# Patient Record
Sex: FEMALE | Race: WHITE | NOT HISPANIC OR LATINO
[De-identification: names, ages, dates, MRNs, and addresses within clinical notes are randomized per-mention and may not be internally consistent; named-entity substitution may affect disease eponyms.]

---

## 2021-11-26 PROBLEM — Z00.00 ENCOUNTER FOR PREVENTIVE HEALTH EXAMINATION: Status: ACTIVE | Noted: 2021-11-26

## 2021-11-29 ENCOUNTER — APPOINTMENT (OUTPATIENT)
Dept: INTERVENTIONAL RADIOLOGY/VASCULAR | Facility: HOSPITAL | Age: 56
End: 2021-11-29

## 2021-11-29 ENCOUNTER — OUTPATIENT (OUTPATIENT)
Dept: OUTPATIENT SERVICES | Facility: HOSPITAL | Age: 56
LOS: 1 days | End: 2021-11-29
Payer: MEDICAID

## 2021-11-29 PROCEDURE — 36573 INSJ PICC RS&I 5 YR+: CPT

## 2021-11-29 PROCEDURE — C1751: CPT

## 2022-08-29 PROBLEM — Z87.442 HISTORY OF KIDNEY STONES: Status: RESOLVED | Noted: 2022-08-29 | Resolved: 2022-08-29

## 2022-08-29 PROBLEM — Z80.1 FAMILY HISTORY OF LUNG CANCER: Status: ACTIVE | Noted: 2022-08-29

## 2022-08-29 PROBLEM — Z80.41 FAMILY HISTORY OF MALIGNANT NEOPLASM OF OVARY: Status: ACTIVE | Noted: 2022-08-29

## 2022-08-29 PROBLEM — Z80.0 FAMILY HISTORY OF THROAT CANCER: Status: ACTIVE | Noted: 2022-08-29

## 2022-09-06 ENCOUNTER — NON-APPOINTMENT (OUTPATIENT)
Age: 57
End: 2022-09-06

## 2022-09-06 ENCOUNTER — APPOINTMENT (OUTPATIENT)
Dept: SURGICAL ONCOLOGY | Facility: CLINIC | Age: 57
End: 2022-09-06

## 2022-09-06 VITALS
TEMPERATURE: 98.2 F | OXYGEN SATURATION: 98 % | WEIGHT: 184 LBS | HEIGHT: 67 IN | DIASTOLIC BLOOD PRESSURE: 79 MMHG | HEART RATE: 117 BPM | BODY MASS INDEX: 28.88 KG/M2 | SYSTOLIC BLOOD PRESSURE: 117 MMHG

## 2022-09-06 DIAGNOSIS — Z87.442 PERSONAL HISTORY OF URINARY CALCULI: ICD-10-CM

## 2022-09-06 DIAGNOSIS — Z80.1 FAMILY HISTORY OF MALIGNANT NEOPLASM OF TRACHEA, BRONCHUS AND LUNG: ICD-10-CM

## 2022-09-06 DIAGNOSIS — Z80.41 FAMILY HISTORY OF MALIGNANT NEOPLASM OF OVARY: ICD-10-CM

## 2022-09-06 DIAGNOSIS — Z78.9 OTHER SPECIFIED HEALTH STATUS: ICD-10-CM

## 2022-09-06 DIAGNOSIS — Z80.0 FAMILY HISTORY OF MALIGNANT NEOPLASM OF DIGESTIVE ORGANS: ICD-10-CM

## 2022-09-06 PROCEDURE — 99204 OFFICE O/P NEW MOD 45 MIN: CPT

## 2022-09-06 RX ORDER — OMEPRAZOLE 40 MG/1
CAPSULE, DELAYED RELEASE ORAL
Refills: 0 | Status: ACTIVE | COMMUNITY

## 2022-09-06 NOTE — REASON FOR VISIT
[Initial Consultation] : an initial consultation for [Colon Cancer] : colon cancer [FreeTextEntry2] : liver mass

## 2022-09-06 NOTE — RESULTS/DATA
[FreeTextEntry1] : Diagnostic Studies\par Date: 9/6/22\par Study: MRI Abdomen (LHR)\par Results:\par \par Date: 7/25/22\par Study: PETCT (R)\par Results: 1) Interval decrease in the size and intensity of the hypermetabolic liver metastases\par 2) Otherwise no abnormal hypermetabolic activity to suggest malignancy at this time\par 3) Status post partial resection of the sigmoid colon\par 4) New 0.7 cm ground glass opacity in the inferior right upper lobe of the lung, which is not significantly hypermetabolic on the corresponding PET images, and may be infectious/inflammatory in etiology. Follow up chest CT in 2-3 months may be helpful in further evaluation\par 5) Anterior abdominal wall hernia containing fat but not bowel\par 6) Periumbilical hernia containing fat but not bowel\par \par Labs:\par Date: 8/18/22\par Results: CEA 1.4, CA 19-9; <3\par \par

## 2022-09-06 NOTE — ASSESSMENT
[FreeTextEntry1] : I) metastatic colon ca to liver\par \par P) Long discussion re findings. Has recurrent disease in 2 liver lesions and is s/p chemo (completed June 2022). Lesions are amenable to wedge resections. Will try minimally invasively. Risks and benefits discussed, including but not limited to post op bleeding, infection, biloma. All questions answered.She has not had a colonoscopy since her colon surgery in 2018 and will need that done prior to surgery. All questions answered.\par \par Pedro Jimenez MD\par \par Chief Surgical Oncology\par Multidisciplinary GI cancer program\par [facility] [place]\par [facility]\par \par Professor Surgery\par [employer]\par \par  cc Dr Oziel Paulson, [facility], [address] [place] [id number]\par

## 2022-09-06 NOTE — HISTORY OF PRESENT ILLNESS
[de-identified] : Patient Name: AUGUSTINA MARES \par MRN: [id number] \par [place] MRN:\par Referring Provider: Dr. Oziel Paulson\par Oncologist: Dr. Paulson\par Date: 9/6/2022 \par \par Diagnosis: colon cancer\par Operative Date: 7/11/2018\par Procedure: Robotic sigmoid colon resection (@ [facility])\par Pathology: [id number]\par \par 57 year female  presents for evaluation of a liver mass in the setting of colon cancer\par 4/2018 - colonoscopy with Dr. Viramontes revealed a descending colon mass; HGD arising from large TVA\par 6/2018 - colonoscopy with Dr. Rachele Perkins, revealed a 4 cm mass in the sigmoid; invasive adenocarcinoma in the background of a tubular adenoma. CT CAP had no mets.\par 7/11/2018 - s/p robotic sigmoid resection at [facility]; T3N1b\par 8/2018 - received adjuvant therapy and was under observation until 12/2021\par 12/2021 - she had 2 hepatic lesions seen on imaging\par 12/2021 - started on FOLFIRI for recurrent metastatic colon cancer (+Erbitux)\par 2/4/22 CEA 4.7\par 2/23/22 - CEA 2.7\par 3/16/22 - PETCT showed moderate decrease in size of 2 lesions.\par + P1K3CA, BRCA2, TP53 mutation with EGFR amplification\par 6/22 - completed FOLFRI/Erbitux\par 7/25/22 - PETCT showed decrease in size of liver mets. She was referred for an MRI\par 8/31/22 - MRI was done at [facility]\par \par Currently, Ms. MARES denies abdominal pain and discomfort, denies having decreased appetite, and denies nausea or vomiting. She denies changes in bowel habits and denies recent unintentional weight loss. She denies fevers, chills, or night sweats.\par \par Functional Status: Ms. MARES is able to walk up 3 flights of stairs without fatigue or dyspnea.\par

## 2022-09-06 NOTE — PHYSICAL EXAM
[Normal Neck Lymph Nodes] : normal neck lymph nodes  [Normal Supraclavicular Lymph Nodes] : normal supraclavicular lymph nodes [Normal] : grossly intact [de-identified] : kim, wears glasses [de-identified] : S1,S2, regular rate and rhythm. No murmurs heard. [de-identified] : Clear throughout. No wheezes heard. [de-identified] : small hernia supraumbilical, nontender. well healed scars [de-identified] : warm and dry

## 2022-10-04 LAB — SARS-COV-2 N GENE NPH QL NAA+PROBE: NOT DETECTED

## 2022-10-05 ENCOUNTER — TRANSCRIPTION ENCOUNTER (OUTPATIENT)
Age: 57
End: 2022-10-05

## 2022-10-05 VITALS
TEMPERATURE: 97 F | HEART RATE: 76 BPM | DIASTOLIC BLOOD PRESSURE: 76 MMHG | WEIGHT: 181 LBS | RESPIRATION RATE: 16 BRPM | SYSTOLIC BLOOD PRESSURE: 115 MMHG | HEIGHT: 67 IN | OXYGEN SATURATION: 96 %

## 2022-10-06 ENCOUNTER — TRANSCRIPTION ENCOUNTER (OUTPATIENT)
Age: 57
End: 2022-10-06

## 2022-10-06 ENCOUNTER — APPOINTMENT (OUTPATIENT)
Dept: SURGICAL ONCOLOGY | Facility: HOSPITAL | Age: 57
End: 2022-10-06

## 2022-10-06 ENCOUNTER — RESULT REVIEW (OUTPATIENT)
Age: 57
End: 2022-10-06

## 2022-10-06 ENCOUNTER — INPATIENT (INPATIENT)
Facility: HOSPITAL | Age: 57
LOS: 4 days | Discharge: HOME | DRG: 421 | End: 2022-10-11
Attending: SURGERY | Admitting: SURGERY
Payer: COMMERCIAL

## 2022-10-06 DIAGNOSIS — C18.7 MALIGNANT NEOPLASM OF SIGMOID COLON: ICD-10-CM

## 2022-10-06 DIAGNOSIS — D18.03 HEMANGIOMA OF INTRA-ABDOMINAL STRUCTURES: ICD-10-CM

## 2022-10-06 DIAGNOSIS — Z90.49 ACQUIRED ABSENCE OF OTHER SPECIFIED PARTS OF DIGESTIVE TRACT: Chronic | ICD-10-CM

## 2022-10-06 DIAGNOSIS — Z53.31 LAPAROSCOPIC SURGICAL PROCEDURE CONVERTED TO OPEN PROCEDURE: ICD-10-CM

## 2022-10-06 DIAGNOSIS — D63.0 ANEMIA IN NEOPLASTIC DISEASE: ICD-10-CM

## 2022-10-06 DIAGNOSIS — Z98.890 OTHER SPECIFIED POSTPROCEDURAL STATES: Chronic | ICD-10-CM

## 2022-10-06 DIAGNOSIS — Z90.49 ACQUIRED ABSENCE OF OTHER SPECIFIED PARTS OF DIGESTIVE TRACT: ICD-10-CM

## 2022-10-06 DIAGNOSIS — E83.39 OTHER DISORDERS OF PHOSPHORUS METABOLISM: ICD-10-CM

## 2022-10-06 DIAGNOSIS — E83.42 HYPOMAGNESEMIA: ICD-10-CM

## 2022-10-06 DIAGNOSIS — Z90.89 ACQUIRED ABSENCE OF OTHER ORGANS: Chronic | ICD-10-CM

## 2022-10-06 DIAGNOSIS — C78.7 SECONDARY MALIGNANT NEOPLASM OF LIVER AND INTRAHEPATIC BILE DUCT: ICD-10-CM

## 2022-10-06 DIAGNOSIS — K43.2 INCISIONAL HERNIA WITHOUT OBSTRUCTION OR GANGRENE: ICD-10-CM

## 2022-10-06 DIAGNOSIS — Z92.21 PERSONAL HISTORY OF ANTINEOPLASTIC CHEMOTHERAPY: ICD-10-CM

## 2022-10-06 LAB
ALBUMIN SERPL ELPH-MCNC: 3.2 G/DL — LOW (ref 3.3–5)
ALP SERPL-CCNC: 66 U/L — SIGNIFICANT CHANGE UP (ref 40–120)
ALT FLD-CCNC: 233 U/L — HIGH (ref 10–45)
ANION GAP SERPL CALC-SCNC: 13 MMOL/L — SIGNIFICANT CHANGE UP (ref 5–17)
APTT BLD: 22.3 SEC — LOW (ref 27.5–35.5)
AST SERPL-CCNC: 287 U/L — HIGH (ref 10–40)
BASE EXCESS BLDA CALC-SCNC: -4.4 MMOL/L — LOW (ref -2–3)
BILIRUB SERPL-MCNC: 0.8 MG/DL — SIGNIFICANT CHANGE UP (ref 0.2–1.2)
BLD GP AB SCN SERPL QL: NEGATIVE — SIGNIFICANT CHANGE UP
BUN SERPL-MCNC: 11 MG/DL — SIGNIFICANT CHANGE UP (ref 7–23)
CA-I BLDA-SCNC: 1.1 MMOL/L — LOW (ref 1.15–1.33)
CALCIUM SERPL-MCNC: 9 MG/DL — SIGNIFICANT CHANGE UP (ref 8.4–10.5)
CHLORIDE SERPL-SCNC: 107 MMOL/L — SIGNIFICANT CHANGE UP (ref 96–108)
CO2 BLDA-SCNC: 21 MMOL/L — SIGNIFICANT CHANGE UP (ref 19–24)
CO2 SERPL-SCNC: 21 MMOL/L — LOW (ref 22–31)
COHGB MFR BLDA: 0.8 % — SIGNIFICANT CHANGE UP
CREAT SERPL-MCNC: 0.75 MG/DL — SIGNIFICANT CHANGE UP (ref 0.5–1.3)
EGFR: 94 ML/MIN/1.73M2 — SIGNIFICANT CHANGE UP
GLUCOSE BLDA-MCNC: 206 MG/DL — HIGH (ref 70–99)
GLUCOSE SERPL-MCNC: 204 MG/DL — HIGH (ref 70–99)
HCO3 BLDA-SCNC: 20 MMOL/L — LOW (ref 21–28)
HCT VFR BLD CALC: 23.3 % — LOW (ref 34.5–45)
HCT VFR BLD CALC: 24.4 % — LOW (ref 34.5–45)
HGB BLD-MCNC: 7.2 G/DL — LOW (ref 11.5–15.5)
HGB BLD-MCNC: 7.7 G/DL — LOW (ref 11.5–15.5)
HGB BLDA-MCNC: 10.4 G/DL — LOW (ref 11.7–16.1)
INR BLD: 1.22 — HIGH (ref 0.88–1.16)
MAGNESIUM SERPL-MCNC: 1.5 MG/DL — LOW (ref 1.6–2.6)
MCHC RBC-ENTMCNC: 26.7 PG — LOW (ref 27–34)
MCHC RBC-ENTMCNC: 26.7 PG — LOW (ref 27–34)
MCHC RBC-ENTMCNC: 30.9 GM/DL — LOW (ref 32–36)
MCHC RBC-ENTMCNC: 31.6 GM/DL — LOW (ref 32–36)
MCV RBC AUTO: 84.7 FL — SIGNIFICANT CHANGE UP (ref 80–100)
MCV RBC AUTO: 86.3 FL — SIGNIFICANT CHANGE UP (ref 80–100)
METHGB MFR BLDA: 0 % — SIGNIFICANT CHANGE UP
NRBC # BLD: 0 /100 WBCS — SIGNIFICANT CHANGE UP (ref 0–0)
NRBC # BLD: 0 /100 WBCS — SIGNIFICANT CHANGE UP (ref 0–0)
OXYHGB MFR BLDA: 98.2 % — HIGH (ref 90–95)
PCO2 BLDA: 34 MMHG — SIGNIFICANT CHANGE UP (ref 32–45)
PH BLDA: 7.38 — SIGNIFICANT CHANGE UP (ref 7.35–7.45)
PHOSPHATE SERPL-MCNC: 5.2 MG/DL — HIGH (ref 2.5–4.5)
PLATELET # BLD AUTO: 259 K/UL — SIGNIFICANT CHANGE UP (ref 150–400)
PLATELET # BLD AUTO: 333 K/UL — SIGNIFICANT CHANGE UP (ref 150–400)
PO2 BLDA: 293 MMHG — HIGH (ref 83–108)
POTASSIUM BLDA-SCNC: 4.5 MMOL/L — SIGNIFICANT CHANGE UP (ref 3.5–5.1)
POTASSIUM SERPL-MCNC: 4.6 MMOL/L — SIGNIFICANT CHANGE UP (ref 3.5–5.3)
POTASSIUM SERPL-SCNC: 4.6 MMOL/L — SIGNIFICANT CHANGE UP (ref 3.5–5.3)
PROT SERPL-MCNC: 4.9 G/DL — LOW (ref 6–8.3)
PROTHROM AB SERPL-ACNC: 14.5 SEC — HIGH (ref 10.5–13.4)
RBC # BLD: 2.7 M/UL — LOW (ref 3.8–5.2)
RBC # BLD: 2.88 M/UL — LOW (ref 3.8–5.2)
RBC # FLD: 16.6 % — HIGH (ref 10.3–14.5)
RBC # FLD: 16.7 % — HIGH (ref 10.3–14.5)
RH IG SCN BLD-IMP: POSITIVE — SIGNIFICANT CHANGE UP
SAO2 % BLDA: 99 % — HIGH (ref 94–98)
SODIUM BLDA-SCNC: 135 MMOL/L — LOW (ref 136–145)
SODIUM SERPL-SCNC: 141 MMOL/L — SIGNIFICANT CHANGE UP (ref 135–145)
WBC # BLD: 13.78 K/UL — HIGH (ref 3.8–10.5)
WBC # BLD: 14.99 K/UL — HIGH (ref 3.8–10.5)
WBC # FLD AUTO: 13.78 K/UL — HIGH (ref 3.8–10.5)
WBC # FLD AUTO: 14.99 K/UL — HIGH (ref 3.8–10.5)

## 2022-10-06 PROCEDURE — 88302 TISSUE EXAM BY PATHOLOGIST: CPT | Mod: 26

## 2022-10-06 PROCEDURE — 76998 US GUIDE INTRAOP: CPT | Mod: 26

## 2022-10-06 PROCEDURE — 88307 TISSUE EXAM BY PATHOLOGIST: CPT | Mod: 26

## 2022-10-06 PROCEDURE — 47100 WEDGE BIOPSY OF LIVER: CPT | Mod: 62,59

## 2022-10-06 PROCEDURE — 47120 PARTIAL REMOVAL OF LIVER: CPT | Mod: 62

## 2022-10-06 PROCEDURE — 88342 IMHCHEM/IMCYTCHM 1ST ANTB: CPT | Mod: 26

## 2022-10-06 PROCEDURE — 99221 1ST HOSP IP/OBS SF/LOW 40: CPT

## 2022-10-06 PROCEDURE — 88309 TISSUE EXAM BY PATHOLOGIST: CPT | Mod: 26

## 2022-10-06 PROCEDURE — 49560: CPT

## 2022-10-06 PROCEDURE — 88341 IMHCHEM/IMCYTCHM EA ADD ANTB: CPT | Mod: 26

## 2022-10-06 PROCEDURE — 88331 PATH CONSLTJ SURG 1 BLK 1SPC: CPT | Mod: 26

## 2022-10-06 PROCEDURE — 71045 X-RAY EXAM CHEST 1 VIEW: CPT | Mod: 26

## 2022-10-06 DEVICE — STAPLER COVIDIEN TRI-STAPLE 60MM TAN RELOAD: Type: IMPLANTABLE DEVICE | Status: FUNCTIONAL

## 2022-10-06 DEVICE — VISTASEAL FIBRIN HUMAN 10ML: Type: IMPLANTABLE DEVICE | Status: FUNCTIONAL

## 2022-10-06 DEVICE — SURGIFLO HEMOSTATIC MATRIX KIT: Type: IMPLANTABLE DEVICE | Status: FUNCTIONAL

## 2022-10-06 DEVICE — CLIP APPLIER ETHICON LIGACLIP 9 3/8" SMALL: Type: IMPLANTABLE DEVICE | Status: FUNCTIONAL

## 2022-10-06 DEVICE — SURGICEL FIBRILLAR 4 X 4": Type: IMPLANTABLE DEVICE | Status: FUNCTIONAL

## 2022-10-06 DEVICE — CLIP APPLIER ETHICON LIGACLIP 11.5" MEDIUM: Type: IMPLANTABLE DEVICE | Status: FUNCTIONAL

## 2022-10-06 DEVICE — SURGICEL FIBRILLAR 1 X 2": Type: IMPLANTABLE DEVICE | Status: FUNCTIONAL

## 2022-10-06 RX ORDER — NALOXONE HYDROCHLORIDE 4 MG/.1ML
0.1 SPRAY NASAL
Refills: 0 | Status: DISCONTINUED | OUTPATIENT
Start: 2022-10-06 | End: 2022-10-11

## 2022-10-06 RX ORDER — HYDROMORPHONE HYDROCHLORIDE 2 MG/ML
0.25 INJECTION INTRAMUSCULAR; INTRAVENOUS; SUBCUTANEOUS ONCE
Refills: 0 | Status: DISCONTINUED | OUTPATIENT
Start: 2022-10-06 | End: 2022-10-06

## 2022-10-06 RX ORDER — SODIUM CHLORIDE 9 MG/ML
500 INJECTION, SOLUTION INTRAVENOUS ONCE
Refills: 0 | Status: COMPLETED | OUTPATIENT
Start: 2022-10-06 | End: 2022-10-06

## 2022-10-06 RX ORDER — HYDROMORPHONE HYDROCHLORIDE 2 MG/ML
30 INJECTION INTRAMUSCULAR; INTRAVENOUS; SUBCUTANEOUS
Refills: 0 | Status: DISCONTINUED | OUTPATIENT
Start: 2022-10-06 | End: 2022-10-08

## 2022-10-06 RX ORDER — ONDANSETRON 8 MG/1
4 TABLET, FILM COATED ORAL EVERY 6 HOURS
Refills: 0 | Status: DISCONTINUED | OUTPATIENT
Start: 2022-10-06 | End: 2022-10-06

## 2022-10-06 RX ORDER — MAGNESIUM SULFATE 500 MG/ML
1 VIAL (ML) INJECTION ONCE
Refills: 0 | Status: COMPLETED | OUTPATIENT
Start: 2022-10-06 | End: 2022-10-06

## 2022-10-06 RX ORDER — PANTOPRAZOLE SODIUM 20 MG/1
40 TABLET, DELAYED RELEASE ORAL DAILY
Refills: 0 | Status: DISCONTINUED | OUTPATIENT
Start: 2022-10-06 | End: 2022-10-11

## 2022-10-06 RX ORDER — SODIUM CHLORIDE 9 MG/ML
1000 INJECTION, SOLUTION INTRAVENOUS
Refills: 0 | Status: DISCONTINUED | OUTPATIENT
Start: 2022-10-06 | End: 2022-10-09

## 2022-10-06 RX ORDER — ONDANSETRON 8 MG/1
4 TABLET, FILM COATED ORAL EVERY 6 HOURS
Refills: 0 | Status: DISCONTINUED | OUTPATIENT
Start: 2022-10-06 | End: 2022-10-11

## 2022-10-06 RX ADMIN — SODIUM CHLORIDE 1000 MILLILITER(S): 9 INJECTION, SOLUTION INTRAVENOUS at 20:37

## 2022-10-06 RX ADMIN — SODIUM CHLORIDE 1000 MILLILITER(S): 9 INJECTION, SOLUTION INTRAVENOUS at 19:19

## 2022-10-06 RX ADMIN — HYDROMORPHONE HYDROCHLORIDE 0.25 MILLIGRAM(S): 2 INJECTION INTRAMUSCULAR; INTRAVENOUS; SUBCUTANEOUS at 16:54

## 2022-10-06 RX ADMIN — PANTOPRAZOLE SODIUM 40 MILLIGRAM(S): 20 TABLET, DELAYED RELEASE ORAL at 16:38

## 2022-10-06 RX ADMIN — HYDROMORPHONE HYDROCHLORIDE 0.25 MILLIGRAM(S): 2 INJECTION INTRAMUSCULAR; INTRAVENOUS; SUBCUTANEOUS at 15:23

## 2022-10-06 RX ADMIN — Medication 100 GRAM(S): at 18:16

## 2022-10-06 RX ADMIN — HYDROMORPHONE HYDROCHLORIDE 30 MILLILITER(S): 2 INJECTION INTRAMUSCULAR; INTRAVENOUS; SUBCUTANEOUS at 16:06

## 2022-10-06 NOTE — H&P ADULT - HISTORY OF PRESENT ILLNESS
58 y/o F w/ PMH of sigmoid adenocarcinoma (T3N1b) with mets to the liver s/p robotic assisted sigmoid resection on 7/11/18 that presents today for a robotic assisted liver partial liver resection. She recieved adjuvant therapy and was under observation until 12/2021 when 2 hepatic lesions seen on imaging so he was started on Folfiri and Erbitux for recurrent metastatic colon cancer. On 3/16/22, underwent a PETCT which showed moderate decrease in size of 2 lesions, + P1K3CA, BRCA2, TP53 mutation with EGFR amplification.

## 2022-10-06 NOTE — BRIEF OPERATIVE NOTE - OPERATION/FINDINGS
Dx lap via RUQ nguyen entry revealed a large 4cm mass anterior surface of segment 4, small 1cm mass segment 3 and 2cm mass segment 6.  Conversion to open via Alysha incision   Segment 3 lesion excised with electrocautery and thunderbeat.   Segment 6 lesion excised with electrocautery and thumberbeat, haemostasis achieved with tackseal   Segment 4 lesion excision with a combination of electrocautery, thunderbeat and cusa with intra-op ultrasound with preservation of blood supply to segment 2/3. Haemostatis achieve with tackseal and vistaseal.   Umbilical hernia repaired primarily with vicryl and stratifix.   Fascia closed in loop PDS and prolene.  Skin loosely closed with staples.

## 2022-10-06 NOTE — PROGRESS NOTE ADULT - SUBJECTIVE AND OBJECTIVE BOX
General Surgery Post op Check    SUBJECTIVE:  Patient seen and examined at bedside in PACU for post op check. Vital signs reviewed. Patient reports she is in considerable amount of pain after the procedure. States her pain is 7/10 and located near abdominal incision. Reports PCA pump has helped slightly with pain. Denies CP, SOB, N, V. States she is hungry and would like to eat.    Vital Signs Last 24 Hrs  T(C): 36.1 (06 Oct 2022 14:58), Max: 36.1 (06 Oct 2022 14:58)  T(F): 96.9 (06 Oct 2022 14:58), Max: 96.9 (06 Oct 2022 14:58)  HR: 104 (06 Oct 2022 16:33) (93 - 104)  BP: 94/88 (06 Oct 2022 16:18) (92/55 - 112/69)  BP(mean): 92 (06 Oct 2022 16:18) (68 - 92)  RR: 17 (06 Oct 2022 16:33) (15 - 20)  SpO2: 100% (06 Oct 2022 16:33) (100% - 100%)    Parameters below as of 06 Oct 2022 16:18  Patient On (Oxygen Delivery Method): nasal cannula  O2 Flow (L/min): 3      I&O's Summary    06 Oct 2022 07:01  -  06 Oct 2022 17:24  --------------------------------------------------------  IN: 360 mL / OUT: 220 mL / NET: 140 mL        Physical Exam  General: Resting in bed, NAD  HEENT: ATNC  Pulmonary: Equal chest wall expansion b/l, no respiratory distress or accessory muscle noted  Cardiovascular: tachycardic - sinus rhythm  Abdomen: Soft, mildly distended, tender to palpation in RUQ without rebound or guarding. Incision clean, dry, intact with minimal sanguinous strike through  : 16 Fr romero catheter in place draining yellow urine  Extremities WWP, SCDs in place, No significant edema noted

## 2022-10-06 NOTE — PROGRESS NOTE ADULT - ASSESSMENT
58 y/o F w/ PMH of sigmoid adenocarcinoma (T3N1b) with mets to the liver s/p robotic assisted sigmoid resection on 7/11/18 that presents today for a robotic assisted liver partial liver resection. Patient now s/p RA converted to open partial liver resection. Patient tolerated the procedure well -  cc. Patient doing well post operatively. Reports pain that is mildly curbed by PCA. 250 cc urine output since OR.  and /67 at post op check. Will closely monitor vitals with potential for PRBC.     -NPO.  -LR @ 120  -No antibiotics  -Dilaudid PCA for pain control. Tylenol PRN  -Nausea control PRN  -Ryder. Will likely remove tomorrow  -Repeat labs with Hb of 7.9. Will monitor CBC and patient's clinical status   -IS

## 2022-10-06 NOTE — H&P ADULT - ASSESSMENT
56 y/o F w/ PMH of sigmoid adenocarcinoma (T3N1b) with mets to the liver s/p robotic assisted sigmoid resection on 7/11/18 that presents today for a robotic assisted liver partial liver resection.   - to OR

## 2022-10-06 NOTE — H&P ADULT - NSICDXPASTSURGICALHX_GEN_ALL_CORE_FT
PAST SURGICAL HISTORY:  History of lithotripsy     History of tonsillectomy     Status post laparoscopic-assisted sigmoidectomy

## 2022-10-07 LAB
ALBUMIN SERPL ELPH-MCNC: 3.3 G/DL — SIGNIFICANT CHANGE UP (ref 3.3–5)
ALP SERPL-CCNC: 79 U/L — SIGNIFICANT CHANGE UP (ref 40–120)
ALT FLD-CCNC: 395 U/L — HIGH (ref 10–45)
ANION GAP SERPL CALC-SCNC: 9 MMOL/L — SIGNIFICANT CHANGE UP (ref 5–17)
AST SERPL-CCNC: 402 U/L — HIGH (ref 10–40)
BILIRUB SERPL-MCNC: 0.8 MG/DL — SIGNIFICANT CHANGE UP (ref 0.2–1.2)
BUN SERPL-MCNC: 14 MG/DL — SIGNIFICANT CHANGE UP (ref 7–23)
CALCIUM SERPL-MCNC: 8.3 MG/DL — LOW (ref 8.4–10.5)
CHLORIDE SERPL-SCNC: 106 MMOL/L — SIGNIFICANT CHANGE UP (ref 96–108)
CO2 SERPL-SCNC: 25 MMOL/L — SIGNIFICANT CHANGE UP (ref 22–31)
CREAT SERPL-MCNC: 0.92 MG/DL — SIGNIFICANT CHANGE UP (ref 0.5–1.3)
EGFR: 74 ML/MIN/1.73M2 — SIGNIFICANT CHANGE UP
GLUCOSE SERPL-MCNC: 151 MG/DL — HIGH (ref 70–99)
HCT VFR BLD CALC: 25.8 % — LOW (ref 34.5–45)
HGB BLD-MCNC: 8.4 G/DL — LOW (ref 11.5–15.5)
MAGNESIUM SERPL-MCNC: 1.9 MG/DL — SIGNIFICANT CHANGE UP (ref 1.6–2.6)
MCHC RBC-ENTMCNC: 27.6 PG — SIGNIFICANT CHANGE UP (ref 27–34)
MCHC RBC-ENTMCNC: 32.6 GM/DL — SIGNIFICANT CHANGE UP (ref 32–36)
MCV RBC AUTO: 84.9 FL — SIGNIFICANT CHANGE UP (ref 80–100)
NRBC # BLD: 0 /100 WBCS — SIGNIFICANT CHANGE UP (ref 0–0)
PHOSPHATE SERPL-MCNC: 5 MG/DL — HIGH (ref 2.5–4.5)
PLATELET # BLD AUTO: 198 K/UL — SIGNIFICANT CHANGE UP (ref 150–400)
POTASSIUM SERPL-MCNC: 5.1 MMOL/L — SIGNIFICANT CHANGE UP (ref 3.5–5.3)
POTASSIUM SERPL-SCNC: 5.1 MMOL/L — SIGNIFICANT CHANGE UP (ref 3.5–5.3)
PROT SERPL-MCNC: 5.3 G/DL — LOW (ref 6–8.3)
RBC # BLD: 3.04 M/UL — LOW (ref 3.8–5.2)
RBC # FLD: 15.9 % — HIGH (ref 10.3–14.5)
SODIUM SERPL-SCNC: 140 MMOL/L — SIGNIFICANT CHANGE UP (ref 135–145)
WBC # BLD: 13.89 K/UL — HIGH (ref 3.8–10.5)
WBC # FLD AUTO: 13.89 K/UL — HIGH (ref 3.8–10.5)

## 2022-10-07 RX ORDER — SODIUM CHLORIDE 9 MG/ML
500 INJECTION, SOLUTION INTRAVENOUS ONCE
Refills: 0 | Status: COMPLETED | OUTPATIENT
Start: 2022-10-07 | End: 2022-10-07

## 2022-10-07 RX ORDER — HEPARIN SODIUM 5000 [USP'U]/ML
5000 INJECTION INTRAVENOUS; SUBCUTANEOUS EVERY 8 HOURS
Refills: 0 | Status: DISCONTINUED | OUTPATIENT
Start: 2022-10-07 | End: 2022-10-11

## 2022-10-07 RX ORDER — MAGNESIUM SULFATE 500 MG/ML
1 VIAL (ML) INJECTION ONCE
Refills: 0 | Status: COMPLETED | OUTPATIENT
Start: 2022-10-07 | End: 2022-10-07

## 2022-10-07 RX ORDER — ACETAMINOPHEN 500 MG
650 TABLET ORAL EVERY 6 HOURS
Refills: 0 | Status: DISCONTINUED | OUTPATIENT
Start: 2022-10-07 | End: 2022-10-10

## 2022-10-07 RX ORDER — SODIUM CHLORIDE 9 MG/ML
500 INJECTION, SOLUTION INTRAVENOUS ONCE
Refills: 0 | Status: DISCONTINUED | OUTPATIENT
Start: 2022-10-07 | End: 2022-10-07

## 2022-10-07 RX ORDER — SODIUM CHLORIDE 9 MG/ML
1000 INJECTION, SOLUTION INTRAVENOUS ONCE
Refills: 0 | Status: DISCONTINUED | OUTPATIENT
Start: 2022-10-07 | End: 2022-10-07

## 2022-10-07 RX ADMIN — Medication 650 MILLIGRAM(S): at 22:09

## 2022-10-07 RX ADMIN — HYDROMORPHONE HYDROCHLORIDE 30 MILLILITER(S): 2 INJECTION INTRAMUSCULAR; INTRAVENOUS; SUBCUTANEOUS at 19:47

## 2022-10-07 RX ADMIN — SODIUM CHLORIDE 120 MILLILITER(S): 9 INJECTION, SOLUTION INTRAVENOUS at 16:49

## 2022-10-07 RX ADMIN — Medication 650 MILLIGRAM(S): at 21:15

## 2022-10-07 RX ADMIN — Medication 650 MILLIGRAM(S): at 19:00

## 2022-10-07 RX ADMIN — Medication 100 GRAM(S): at 09:21

## 2022-10-07 RX ADMIN — SODIUM CHLORIDE 1000 MILLILITER(S): 9 INJECTION, SOLUTION INTRAVENOUS at 13:38

## 2022-10-07 RX ADMIN — HEPARIN SODIUM 5000 UNIT(S): 5000 INJECTION INTRAVENOUS; SUBCUTANEOUS at 22:09

## 2022-10-07 RX ADMIN — Medication 650 MILLIGRAM(S): at 12:49

## 2022-10-07 RX ADMIN — Medication 650 MILLIGRAM(S): at 18:17

## 2022-10-07 RX ADMIN — SODIUM CHLORIDE 1000 MILLILITER(S): 9 INJECTION, SOLUTION INTRAVENOUS at 03:20

## 2022-10-07 RX ADMIN — PANTOPRAZOLE SODIUM 40 MILLIGRAM(S): 20 TABLET, DELAYED RELEASE ORAL at 12:49

## 2022-10-07 RX ADMIN — HEPARIN SODIUM 5000 UNIT(S): 5000 INJECTION INTRAVENOUS; SUBCUTANEOUS at 15:36

## 2022-10-07 RX ADMIN — Medication 650 MILLIGRAM(S): at 13:30

## 2022-10-07 NOTE — PROVIDER CONTACT NOTE (OTHER) - SITUATION
There in no order or indication for use for R triple lumen IJ central line placed yesterday
Patient has been tachycardic most of the morning, low urine output from Ryder

## 2022-10-07 NOTE — PROGRESS NOTE ADULT - ASSESSMENT
56 y/o F w/ PMH of sigmoid adenocarcinoma (T3N1b) with mets to the liver s/p robotic assisted sigmoid resection on 7/11/18 that presents today for a robotic assisted liver partial liver resection. She recieved adjuvant therapy and was under observation until 12/2021 when 2 hepatic lesions seen on imaging so he was started on Folfiri and Erbitux for recurrent metastatic colon cancer. On 3/16/22, underwent a PETCT which showed moderate decrease in size of 2 lesions, + P1K3CA, BRCA2, TP53 mutation with EGFR amplification. Pt is now s/p RA converted to open partial liver resection on 10/6.     Pain/nausea control prn;   Dilaudid PCA. Tylenol PRN.   No antibiotics  LR @ 120  CLD  SCDs, holding HSQ until afternoon 10/7  PPI  OOBA/IS  Ryder

## 2022-10-07 NOTE — PROVIDER CONTACT NOTE (OTHER) - ASSESSMENT
Patient tachycardic to 110s, as high as 120s, afebrile, denies pain at this time, Ryder output documented in flowsheet, MD Issa made aware. Team also inquired regarding removal of R triple lumen IJ.
Patient has R triple lumen IJ central line placed yesterday in OR. Patient already has L 18G and R 22G peripheral IVs in place. Expressed concern for risk for CLABSI to team, chief of surgery initially declined removal of central line, IRVIN Mann and GERDA Crystal made aware

## 2022-10-07 NOTE — PROVIDER CONTACT NOTE (OTHER) - RECOMMENDATIONS
500ml LR bolus ordered, continue to monitor vital signs and urine output
Central line to be removed by surgical team

## 2022-10-07 NOTE — PROGRESS NOTE ADULT - SUBJECTIVE AND OBJECTIVE BOX
STATUS POST:  Open partial resection of liver    POST OPERATIVE DAY #: 1    SUBJECTIVE: Patient was seen and examined bedside with chief resident. Patient states she is in less pain than she was initially after the procedure. Patient has not yet had any flatulence or bowel movements. Patient denies any nausea or vomitting.     Vital Signs Last 24 Hrs  T(C): 36.8 (07 Oct 2022 09:38), Max: 37.3 (07 Oct 2022 04:47)  T(F): 98.2 (07 Oct 2022 09:38), Max: 99.2 (07 Oct 2022 04:47)  HR: 122 (07 Oct 2022 08:33) (93 - 122)  BP: 109/62 (07 Oct 2022 08:33) (88/54 - 125/61)  BP(mean): 80 (07 Oct 2022 08:33) (64 - 92)  RR: 17 (07 Oct 2022 08:33) (12 - 20)  SpO2: 95% (07 Oct 2022 08:33) (95% - 100%)    Parameters below as of 07 Oct 2022 08:33  Patient On (Oxygen Delivery Method): nasal cannula  O2 Flow (L/min): 2      I&O's Summary    06 Oct 2022 07:01  -  07 Oct 2022 07:00  --------------------------------------------------------  IN: 4720 mL / OUT: 660 mL / NET: 4060 mL    07 Oct 2022 07:01  -  07 Oct 2022 10:09  --------------------------------------------------------  IN: 240 mL / OUT: 100 mL / NET: 140 mL        Physical Exam:  General: Resting in bed, NAD  HEENT: ATNC  Pulmonary: Equal chest wall expansion b/l, no respiratory distress or accessory muscle noted  Cardiovascular: tachycardic sinus rhythm  Abdomen: Soft, mildly distended, tender to palpation in RUQ without rebound or guarding. Incision clean, dry, intact with minimal sanguinous strike through  : Ryder catheter in place draining yellow urine  Extremities WWP, SCDs in place, No significant edema noted      LABS:                        8.4    13.89 )-----------( 198      ( 07 Oct 2022 04:54 )             25.8     10-07    140  |  106  |  14  ----------------------------<  151<H>  5.1   |  25  |  0.92    Ca    8.3<L>      07 Oct 2022 04:54  Phos  5.0     10-07  Mg     1.9     10-07    TPro  5.3<L>  /  Alb  3.3  /  TBili  0.8  /  DBili  x   /  AST  402<H>  /  ALT  395<H>  /  AlkPhos  79  10-07    PT/INR - ( 06 Oct 2022 16:36 )   PT: 14.5 sec;   INR: 1.22          PTT - ( 06 Oct 2022 16:36 )  PTT:22.3 sec

## 2022-10-08 LAB
ANION GAP SERPL CALC-SCNC: 8 MMOL/L — SIGNIFICANT CHANGE UP (ref 5–17)
BUN SERPL-MCNC: 8 MG/DL — SIGNIFICANT CHANGE UP (ref 7–23)
CALCIUM SERPL-MCNC: 8.2 MG/DL — LOW (ref 8.4–10.5)
CHLORIDE SERPL-SCNC: 104 MMOL/L — SIGNIFICANT CHANGE UP (ref 96–108)
CO2 SERPL-SCNC: 27 MMOL/L — SIGNIFICANT CHANGE UP (ref 22–31)
CREAT SERPL-MCNC: 0.67 MG/DL — SIGNIFICANT CHANGE UP (ref 0.5–1.3)
EGFR: 103 ML/MIN/1.73M2 — SIGNIFICANT CHANGE UP
GLUCOSE SERPL-MCNC: 110 MG/DL — HIGH (ref 70–99)
HCT VFR BLD CALC: 22.6 % — LOW (ref 34.5–45)
HCT VFR BLD CALC: 24 % — LOW (ref 34.5–45)
HGB BLD-MCNC: 7.1 G/DL — LOW (ref 11.5–15.5)
HGB BLD-MCNC: 7.9 G/DL — LOW (ref 11.5–15.5)
MAGNESIUM SERPL-MCNC: 2 MG/DL — SIGNIFICANT CHANGE UP (ref 1.6–2.6)
MCHC RBC-ENTMCNC: 27.7 PG — SIGNIFICANT CHANGE UP (ref 27–34)
MCHC RBC-ENTMCNC: 28.6 PG — SIGNIFICANT CHANGE UP (ref 27–34)
MCHC RBC-ENTMCNC: 31.4 GM/DL — LOW (ref 32–36)
MCHC RBC-ENTMCNC: 32.9 GM/DL — SIGNIFICANT CHANGE UP (ref 32–36)
MCV RBC AUTO: 87 FL — SIGNIFICANT CHANGE UP (ref 80–100)
MCV RBC AUTO: 88.3 FL — SIGNIFICANT CHANGE UP (ref 80–100)
NRBC # BLD: 0 /100 WBCS — SIGNIFICANT CHANGE UP (ref 0–0)
NRBC # BLD: 0 /100 WBCS — SIGNIFICANT CHANGE UP (ref 0–0)
PHOSPHATE SERPL-MCNC: 2.2 MG/DL — LOW (ref 2.5–4.5)
PLATELET # BLD AUTO: 133 K/UL — LOW (ref 150–400)
PLATELET # BLD AUTO: 143 K/UL — LOW (ref 150–400)
POTASSIUM SERPL-MCNC: 4.4 MMOL/L — SIGNIFICANT CHANGE UP (ref 3.5–5.3)
POTASSIUM SERPL-SCNC: 4.4 MMOL/L — SIGNIFICANT CHANGE UP (ref 3.5–5.3)
RBC # BLD: 2.56 M/UL — LOW (ref 3.8–5.2)
RBC # BLD: 2.76 M/UL — LOW (ref 3.8–5.2)
RBC # FLD: 15.7 % — HIGH (ref 10.3–14.5)
RBC # FLD: 16.3 % — HIGH (ref 10.3–14.5)
SODIUM SERPL-SCNC: 139 MMOL/L — SIGNIFICANT CHANGE UP (ref 135–145)
WBC # BLD: 13.9 K/UL — HIGH (ref 3.8–10.5)
WBC # BLD: 14.19 K/UL — HIGH (ref 3.8–10.5)
WBC # FLD AUTO: 13.9 K/UL — HIGH (ref 3.8–10.5)
WBC # FLD AUTO: 14.19 K/UL — HIGH (ref 3.8–10.5)

## 2022-10-08 RX ORDER — POTASSIUM PHOSPHATE, MONOBASIC POTASSIUM PHOSPHATE, DIBASIC 236; 224 MG/ML; MG/ML
15 INJECTION, SOLUTION INTRAVENOUS ONCE
Refills: 0 | Status: COMPLETED | OUTPATIENT
Start: 2022-10-08 | End: 2022-10-08

## 2022-10-08 RX ORDER — HYDROMORPHONE HYDROCHLORIDE 2 MG/ML
0.5 INJECTION INTRAMUSCULAR; INTRAVENOUS; SUBCUTANEOUS EVERY 6 HOURS
Refills: 0 | Status: DISCONTINUED | OUTPATIENT
Start: 2022-10-08 | End: 2022-10-11

## 2022-10-08 RX ORDER — OXYCODONE HYDROCHLORIDE 5 MG/1
5 TABLET ORAL EVERY 6 HOURS
Refills: 0 | Status: DISCONTINUED | OUTPATIENT
Start: 2022-10-08 | End: 2022-10-08

## 2022-10-08 RX ORDER — OXYCODONE HYDROCHLORIDE 5 MG/1
10 TABLET ORAL EVERY 6 HOURS
Refills: 0 | Status: DISCONTINUED | OUTPATIENT
Start: 2022-10-08 | End: 2022-10-08

## 2022-10-08 RX ORDER — HYDROMORPHONE HYDROCHLORIDE 2 MG/ML
0.25 INJECTION INTRAMUSCULAR; INTRAVENOUS; SUBCUTANEOUS ONCE
Refills: 0 | Status: DISCONTINUED | OUTPATIENT
Start: 2022-10-08 | End: 2022-10-08

## 2022-10-08 RX ADMIN — Medication 650 MILLIGRAM(S): at 17:29

## 2022-10-08 RX ADMIN — HYDROMORPHONE HYDROCHLORIDE 0.25 MILLIGRAM(S): 2 INJECTION INTRAMUSCULAR; INTRAVENOUS; SUBCUTANEOUS at 14:21

## 2022-10-08 RX ADMIN — HEPARIN SODIUM 5000 UNIT(S): 5000 INJECTION INTRAVENOUS; SUBCUTANEOUS at 06:16

## 2022-10-08 RX ADMIN — HYDROMORPHONE HYDROCHLORIDE 0.5 MILLIGRAM(S): 2 INJECTION INTRAMUSCULAR; INTRAVENOUS; SUBCUTANEOUS at 13:00

## 2022-10-08 RX ADMIN — HYDROMORPHONE HYDROCHLORIDE 0.25 MILLIGRAM(S): 2 INJECTION INTRAMUSCULAR; INTRAVENOUS; SUBCUTANEOUS at 22:29

## 2022-10-08 RX ADMIN — HYDROMORPHONE HYDROCHLORIDE 0.5 MILLIGRAM(S): 2 INJECTION INTRAMUSCULAR; INTRAVENOUS; SUBCUTANEOUS at 19:11

## 2022-10-08 RX ADMIN — HEPARIN SODIUM 5000 UNIT(S): 5000 INJECTION INTRAVENOUS; SUBCUTANEOUS at 14:21

## 2022-10-08 RX ADMIN — PANTOPRAZOLE SODIUM 40 MILLIGRAM(S): 20 TABLET, DELAYED RELEASE ORAL at 11:20

## 2022-10-08 RX ADMIN — POTASSIUM PHOSPHATE, MONOBASIC POTASSIUM PHOSPHATE, DIBASIC 62.5 MILLIMOLE(S): 236; 224 INJECTION, SOLUTION INTRAVENOUS at 13:53

## 2022-10-08 RX ADMIN — HYDROMORPHONE HYDROCHLORIDE 0.5 MILLIGRAM(S): 2 INJECTION INTRAMUSCULAR; INTRAVENOUS; SUBCUTANEOUS at 20:00

## 2022-10-08 RX ADMIN — HYDROMORPHONE HYDROCHLORIDE 0.25 MILLIGRAM(S): 2 INJECTION INTRAMUSCULAR; INTRAVENOUS; SUBCUTANEOUS at 23:43

## 2022-10-08 RX ADMIN — HYDROMORPHONE HYDROCHLORIDE 0.5 MILLIGRAM(S): 2 INJECTION INTRAMUSCULAR; INTRAVENOUS; SUBCUTANEOUS at 12:18

## 2022-10-08 RX ADMIN — Medication 650 MILLIGRAM(S): at 06:17

## 2022-10-08 RX ADMIN — Medication 650 MILLIGRAM(S): at 12:56

## 2022-10-08 RX ADMIN — Medication 650 MILLIGRAM(S): at 07:43

## 2022-10-08 RX ADMIN — HEPARIN SODIUM 5000 UNIT(S): 5000 INJECTION INTRAVENOUS; SUBCUTANEOUS at 22:16

## 2022-10-08 RX ADMIN — HYDROMORPHONE HYDROCHLORIDE 0.25 MILLIGRAM(S): 2 INJECTION INTRAMUSCULAR; INTRAVENOUS; SUBCUTANEOUS at 15:35

## 2022-10-08 RX ADMIN — Medication 650 MILLIGRAM(S): at 17:15

## 2022-10-08 RX ADMIN — Medication 650 MILLIGRAM(S): at 11:20

## 2022-10-08 NOTE — PROGRESS NOTE ADULT - SUBJECTIVE AND OBJECTIVE BOX
STATUS POST:  Open partial resection of liver      POST OPERATIVE DAY #: 2    SUBJECTIVE:   Patient seen and examined at bedside by chief during AM rounds. Patient reports she is doing well this morning. Reports pain is well controlled with PCA. States she is not nauseous and denies emesis. States she is tolerating clear liquid diet without nausea or vomiting. Patient has not been out of bed yet. Denies issues with romero catheter.    Vital Signs Last 24 Hrs  T(C): 37.1 (08 Oct 2022 14:00), Max: 37.3 (07 Oct 2022 21:15)  T(F): 98.7 (08 Oct 2022 14:00), Max: 99.2 (07 Oct 2022 21:15)  HR: 102 (08 Oct 2022 13:38) (102 - 114)  BP: 109/60 (08 Oct 2022 13:38) (100/57 - 122/59)  BP(mean): 78 (08 Oct 2022 13:38) (73 - 83)  RR: 17 (08 Oct 2022 13:38) (16 - 18)  SpO2: 94% (08 Oct 2022 13:38) (92% - 95%)    Parameters below as of 08 Oct 2022 13:38  Patient On (Oxygen Delivery Method): nasal cannula  O2 Flow (L/min): 3      I&O's Summary    07 Oct 2022 07:01  -  08 Oct 2022 07:00  --------------------------------------------------------  IN: 3980 mL / OUT: 1450 mL / NET: 2530 mL    08 Oct 2022 07:01  -  08 Oct 2022 15:15  --------------------------------------------------------  IN: 1640 mL / OUT: 1525 mL / NET: 115 mL        Physical Exam:  General: Resting comfortably in bed, NAD  HEENT: ATNC  Pulmonary: Nonlabored breathing, no respiratory distress, no accessory muscle use noted, saturating well on NC  Cardiovascular: Sinus rhythm. Mildly tachycardic  Abdomen: Soft, mildly distended, incisions, clean, dry, intact with minimal s/t. No rebound or guarding.   Extremities: WWP, SCDs in place, No significant edema appreciated    LABS:                        7.1    14.19 )-----------( 143      ( 08 Oct 2022 06:10 )             22.6     10-08    139  |  104  |  8   ----------------------------<  110<H>  4.4   |  27  |  0.67    Ca    8.2<L>      08 Oct 2022 06:10  Phos  2.2     10-08  Mg     2.0     10-08    TPro  5.3<L>  /  Alb  3.3  /  TBili  0.8  /  DBili  x   /  AST  402<H>  /  ALT  395<H>  /  AlkPhos  79  10-07    PT/INR - ( 06 Oct 2022 16:36 )   PT: 14.5 sec;   INR: 1.22          PTT - ( 06 Oct 2022 16:36 )  PTT:22.3 sec

## 2022-10-08 NOTE — PROGRESS NOTE ADULT - ASSESSMENT
58 y/o F w/ PMH of sigmoid adenocarcinoma (T3N1b) with mets to the liver s/p robotic assisted sigmoid resection on 7/11/18 that presents today for a robotic assisted liver partial liver resection. She recieved adjuvant therapy and was under observation until 12/2021 when 2 hepatic lesions seen on imaging so he was started on Folfiri and Erbitux for recurrent metastatic colon cancer. On 3/16/22, underwent a PETCT which showed moderate decrease in size of 2 lesions, + P1K3CA, BRCA2, TP53 mutation with EGFR amplification. Pt is now s/p RA converted to open partial liver resection on 10/6. POD2 and doing well. Tachycardia slowly resolving and BP rising. Patient reports pain is controlled and tolerating CLD. Hb 7.1 this AM.    Continue CLD  LR @ 120  Will D/C dilaudid PCA today and try PRN dilaudid/tylenol for pain  No antibiotics  SCDs/HSQ  Hb 7.1 this AM - Will order 1 u PRBC  PPI  OOBA/IS  D/C romero today. Will F/U TOV

## 2022-10-09 LAB
ALBUMIN SERPL ELPH-MCNC: 3 G/DL — LOW (ref 3.3–5)
ALP SERPL-CCNC: 103 U/L — SIGNIFICANT CHANGE UP (ref 40–120)
ALT FLD-CCNC: 290 U/L — HIGH (ref 10–45)
ANION GAP SERPL CALC-SCNC: 13 MMOL/L — SIGNIFICANT CHANGE UP (ref 5–17)
AST SERPL-CCNC: 168 U/L — HIGH (ref 10–40)
BILIRUB SERPL-MCNC: 0.5 MG/DL — SIGNIFICANT CHANGE UP (ref 0.2–1.2)
BUN SERPL-MCNC: 5 MG/DL — LOW (ref 7–23)
CALCIUM SERPL-MCNC: 8.8 MG/DL — SIGNIFICANT CHANGE UP (ref 8.4–10.5)
CHLORIDE SERPL-SCNC: 105 MMOL/L — SIGNIFICANT CHANGE UP (ref 96–108)
CO2 SERPL-SCNC: 22 MMOL/L — SIGNIFICANT CHANGE UP (ref 22–31)
CREAT SERPL-MCNC: 0.52 MG/DL — SIGNIFICANT CHANGE UP (ref 0.5–1.3)
EGFR: 110 ML/MIN/1.73M2 — SIGNIFICANT CHANGE UP
GLUCOSE SERPL-MCNC: 96 MG/DL — SIGNIFICANT CHANGE UP (ref 70–99)
HCT VFR BLD CALC: 26.3 % — LOW (ref 34.5–45)
HGB BLD-MCNC: 8.5 G/DL — LOW (ref 11.5–15.5)
MAGNESIUM SERPL-MCNC: 2.1 MG/DL — SIGNIFICANT CHANGE UP (ref 1.6–2.6)
MCHC RBC-ENTMCNC: 28.1 PG — SIGNIFICANT CHANGE UP (ref 27–34)
MCHC RBC-ENTMCNC: 32.3 GM/DL — SIGNIFICANT CHANGE UP (ref 32–36)
MCV RBC AUTO: 86.8 FL — SIGNIFICANT CHANGE UP (ref 80–100)
NRBC # BLD: 0 /100 WBCS — SIGNIFICANT CHANGE UP (ref 0–0)
PHOSPHATE SERPL-MCNC: 2.4 MG/DL — LOW (ref 2.5–4.5)
PLATELET # BLD AUTO: 161 K/UL — SIGNIFICANT CHANGE UP (ref 150–400)
POTASSIUM SERPL-MCNC: 3.9 MMOL/L — SIGNIFICANT CHANGE UP (ref 3.5–5.3)
POTASSIUM SERPL-SCNC: 3.9 MMOL/L — SIGNIFICANT CHANGE UP (ref 3.5–5.3)
PROT SERPL-MCNC: 5.8 G/DL — LOW (ref 6–8.3)
RBC # BLD: 3.03 M/UL — LOW (ref 3.8–5.2)
RBC # FLD: 15.7 % — HIGH (ref 10.3–14.5)
SODIUM SERPL-SCNC: 140 MMOL/L — SIGNIFICANT CHANGE UP (ref 135–145)
WBC # BLD: 14.84 K/UL — HIGH (ref 3.8–10.5)
WBC # FLD AUTO: 14.84 K/UL — HIGH (ref 3.8–10.5)

## 2022-10-09 RX ORDER — BENZOCAINE AND MENTHOL 5; 1 G/100ML; G/100ML
1 LIQUID ORAL EVERY 6 HOURS
Refills: 0 | Status: DISCONTINUED | OUTPATIENT
Start: 2022-10-09 | End: 2022-10-11

## 2022-10-09 RX ORDER — POTASSIUM PHOSPHATE, MONOBASIC POTASSIUM PHOSPHATE, DIBASIC 236; 224 MG/ML; MG/ML
15 INJECTION, SOLUTION INTRAVENOUS ONCE
Refills: 0 | Status: COMPLETED | OUTPATIENT
Start: 2022-10-09 | End: 2022-10-09

## 2022-10-09 RX ADMIN — POTASSIUM PHOSPHATE, MONOBASIC POTASSIUM PHOSPHATE, DIBASIC 62.5 MILLIMOLE(S): 236; 224 INJECTION, SOLUTION INTRAVENOUS at 11:12

## 2022-10-09 RX ADMIN — BENZOCAINE AND MENTHOL 1 LOZENGE: 5; 1 LIQUID ORAL at 09:41

## 2022-10-09 RX ADMIN — Medication 650 MILLIGRAM(S): at 01:25

## 2022-10-09 RX ADMIN — HEPARIN SODIUM 5000 UNIT(S): 5000 INJECTION INTRAVENOUS; SUBCUTANEOUS at 15:24

## 2022-10-09 RX ADMIN — SODIUM CHLORIDE 120 MILLILITER(S): 9 INJECTION, SOLUTION INTRAVENOUS at 07:12

## 2022-10-09 RX ADMIN — Medication 650 MILLIGRAM(S): at 06:59

## 2022-10-09 RX ADMIN — Medication 650 MILLIGRAM(S): at 23:55

## 2022-10-09 RX ADMIN — PANTOPRAZOLE SODIUM 40 MILLIGRAM(S): 20 TABLET, DELAYED RELEASE ORAL at 11:36

## 2022-10-09 RX ADMIN — HEPARIN SODIUM 5000 UNIT(S): 5000 INJECTION INTRAVENOUS; SUBCUTANEOUS at 06:59

## 2022-10-09 RX ADMIN — Medication 650 MILLIGRAM(S): at 17:12

## 2022-10-09 RX ADMIN — Medication 650 MILLIGRAM(S): at 13:56

## 2022-10-09 RX ADMIN — HEPARIN SODIUM 5000 UNIT(S): 5000 INJECTION INTRAVENOUS; SUBCUTANEOUS at 23:55

## 2022-10-09 RX ADMIN — Medication 650 MILLIGRAM(S): at 07:11

## 2022-10-09 RX ADMIN — Medication 650 MILLIGRAM(S): at 11:36

## 2022-10-09 RX ADMIN — Medication 650 MILLIGRAM(S): at 18:13

## 2022-10-09 RX ADMIN — Medication 650 MILLIGRAM(S): at 00:58

## 2022-10-09 NOTE — PROGRESS NOTE ADULT - SUBJECTIVE AND OBJECTIVE BOX
SURGERY DAILY PROGRESS NOTE:     SUBJECTIVE/ROS: No acute overnight events. Patient seen and examined at bedside during morning rounds.    OBJECTIVE:    Vital Signs Last 24 Hrs  T(C): 36.8 (09 Oct 2022 09:00), Max: 37.3 (08 Oct 2022 13:38)  T(F): 98.2 (09 Oct 2022 09:00), Max: 99.1 (08 Oct 2022 13:38)  HR: 108 (09 Oct 2022 08:30) (90 - 114)  BP: 130/60 (09 Oct 2022 08:30) (106/57 - 136/61)  BP(mean): 87 (09 Oct 2022 08:30) (77 - 93)  RR: 19 (09 Oct 2022 08:30) (16 - 20)  SpO2: 92% (09 Oct 2022 08:30) (92% - 96%)    Parameters below as of 09 Oct 2022 08:30  Patient On (Oxygen Delivery Method): room air    I&O's Detail    08 Oct 2022 07:01  -  09 Oct 2022 07:00  --------------------------------------------------------  IN:    Lactated Ringers: 2880 mL    Oral Fluid: 840 mL    PRBCs (Packed Red Blood Cells): 200 mL  Total IN: 3920 mL    OUT:    Indwelling Catheter - Urethral (mL): 850 mL    Voided (mL): 3575 mL  Total OUT: 4425 mL    Total NET: -505 mL      09 Oct 2022 07:01  -  09 Oct 2022 10:52  --------------------------------------------------------  IN:    Lactated Ringers: 240 mL  Total IN: 240 mL    OUT:    Voided (mL): 1000 mL  Total OUT: 1000 mL    Total NET: -760 mL    Physical Exam:  General: Resting comfortably in bed, NAD  HEENT: ATNC  Pulmonary: Nonlabored breathing, no respiratory distress, no accessory muscle use noted, saturating well on NC  Cardiovascular: Sinus rhythm. Mildly tachycardic  Abdomen: Soft, mildly distended, incisions, clean, dry, intact with minimal s/t. No rebound or guarding.   Extremities: WWP, SCDs in place, No significant edema appreciated    LABS:                        8.5    14.84 )-----------( 161      ( 09 Oct 2022 05:19 )             26.3     10-09    140  |  105  |  5<L>  ----------------------------<  96  3.9   |  22  |  0.52    Ca    8.8      09 Oct 2022 05:19  Phos  2.4     10-09  Mg     2.1     10-09    TPro  5.8<L>  /  Alb  3.0<L>  /  TBili  0.5  /  DBili  x   /  AST  168<H>  /  ALT  290<H>  /  AlkPhos  103  10-09                         SURGERY DAILY PROGRESS NOTE:     SUBJECTIVE/ROS: No acute overnight events. Patient seen and examined at bedside during morning rounds. No complaints this morning, pain is well controlled. Tolerating diet w/o nausea or emesis.    OBJECTIVE:    Vital Signs Last 24 Hrs  T(C): 36.8 (09 Oct 2022 09:00), Max: 37.3 (08 Oct 2022 13:38)  T(F): 98.2 (09 Oct 2022 09:00), Max: 99.1 (08 Oct 2022 13:38)  HR: 108 (09 Oct 2022 08:30) (90 - 114)  BP: 130/60 (09 Oct 2022 08:30) (106/57 - 136/61)  BP(mean): 87 (09 Oct 2022 08:30) (77 - 93)  RR: 19 (09 Oct 2022 08:30) (16 - 20)  SpO2: 92% (09 Oct 2022 08:30) (92% - 96%)    Parameters below as of 09 Oct 2022 08:30  Patient On (Oxygen Delivery Method): room air    I&O's Detail    08 Oct 2022 07:01  -  09 Oct 2022 07:00  --------------------------------------------------------  IN:    Lactated Ringers: 2880 mL    Oral Fluid: 840 mL    PRBCs (Packed Red Blood Cells): 200 mL  Total IN: 3920 mL    OUT:    Indwelling Catheter - Urethral (mL): 850 mL    Voided (mL): 3575 mL  Total OUT: 4425 mL    Total NET: -505 mL      09 Oct 2022 07:01  -  09 Oct 2022 10:52  --------------------------------------------------------  IN:    Lactated Ringers: 240 mL  Total IN: 240 mL    OUT:    Voided (mL): 1000 mL  Total OUT: 1000 mL    Total NET: -760 mL    Physical Exam:  General: Resting comfortably in bed, NAD  HEENT: ATNC  Pulmonary: Nonlabored breathing, no respiratory distress, no accessory muscle use noted, saturating well on NC  Cardiovascular: Sinus rhythm. Mildly tachycardic  Abdomen: Soft, mildly distended, incisions, clean, dry, intact with minimal s/t. No rebound or guarding.   Extremities: WWP, SCDs in place, No significant edema appreciated    LABS:                        8.5    14.84 )-----------( 161      ( 09 Oct 2022 05:19 )             26.3     10-09    140  |  105  |  5<L>  ----------------------------<  96  3.9   |  22  |  0.52    Ca    8.8      09 Oct 2022 05:19  Phos  2.4     10-09  Mg     2.1     10-09    TPro  5.8<L>  /  Alb  3.0<L>  /  TBili  0.5  /  DBili  x   /  AST  168<H>  /  ALT  290<H>  /  AlkPhos  103  10-09

## 2022-10-09 NOTE — PROGRESS NOTE ADULT - ASSESSMENT
56 y/o F w/ PMH of sigmoid adenocarcinoma (T3N1b) with mets to the liver s/p robotic assisted sigmoid resection on 7/11/18 that presents today for a robotic assisted liver partial liver resection. She recieved adjuvant therapy and was under observation until 12/2021 when 2 hepatic lesions seen on imaging so he was started on Folfiri and Erbitux for recurrent metastatic colon cancer. On 3/16/22, underwent a PETCT which showed moderate decrease in size of 2 lesions, + P1K3CA, BRCA2, TP53 mutation with EGFR amplification. Pt is now s/p RA converted to open partial liver resection on 10/6. POD2 and doing well. Tachycardia slowly resolving and BP rising. Patient reports pain is controlled and tolerating CLD. Hb 7.1 10/9 - s/p 1U prbc with response to 8.5.    Continue CLD  LR  Tylenol, prn dilaudid for pain control  No antibiotics  SCDs/HSQ  PPI  OOBA  F/u AM labs, f/u UOP

## 2022-10-10 ENCOUNTER — TRANSCRIPTION ENCOUNTER (OUTPATIENT)
Age: 57
End: 2022-10-10

## 2022-10-10 LAB
ANION GAP SERPL CALC-SCNC: 11 MMOL/L — SIGNIFICANT CHANGE UP (ref 5–17)
BUN SERPL-MCNC: 5 MG/DL — LOW (ref 7–23)
CALCIUM SERPL-MCNC: 8.5 MG/DL — SIGNIFICANT CHANGE UP (ref 8.4–10.5)
CHLORIDE SERPL-SCNC: 106 MMOL/L — SIGNIFICANT CHANGE UP (ref 96–108)
CO2 SERPL-SCNC: 25 MMOL/L — SIGNIFICANT CHANGE UP (ref 22–31)
CREAT SERPL-MCNC: 0.53 MG/DL — SIGNIFICANT CHANGE UP (ref 0.5–1.3)
EGFR: 109 ML/MIN/1.73M2 — SIGNIFICANT CHANGE UP
GLUCOSE SERPL-MCNC: 108 MG/DL — HIGH (ref 70–99)
HCT VFR BLD CALC: 24.5 % — LOW (ref 34.5–45)
HGB BLD-MCNC: 8 G/DL — LOW (ref 11.5–15.5)
MAGNESIUM SERPL-MCNC: 2.1 MG/DL — SIGNIFICANT CHANGE UP (ref 1.6–2.6)
MCHC RBC-ENTMCNC: 27.9 PG — SIGNIFICANT CHANGE UP (ref 27–34)
MCHC RBC-ENTMCNC: 32.7 GM/DL — SIGNIFICANT CHANGE UP (ref 32–36)
MCV RBC AUTO: 85.4 FL — SIGNIFICANT CHANGE UP (ref 80–100)
NRBC # BLD: 0 /100 WBCS — SIGNIFICANT CHANGE UP (ref 0–0)
PHOSPHATE SERPL-MCNC: 3.1 MG/DL — SIGNIFICANT CHANGE UP (ref 2.5–4.5)
PLATELET # BLD AUTO: 195 K/UL — SIGNIFICANT CHANGE UP (ref 150–400)
POTASSIUM SERPL-MCNC: 3 MMOL/L — LOW (ref 3.5–5.3)
POTASSIUM SERPL-SCNC: 3 MMOL/L — LOW (ref 3.5–5.3)
RBC # BLD: 2.87 M/UL — LOW (ref 3.8–5.2)
RBC # FLD: 15.8 % — HIGH (ref 10.3–14.5)
SODIUM SERPL-SCNC: 142 MMOL/L — SIGNIFICANT CHANGE UP (ref 135–145)
WBC # BLD: 11.63 K/UL — HIGH (ref 3.8–10.5)
WBC # FLD AUTO: 11.63 K/UL — HIGH (ref 3.8–10.5)

## 2022-10-10 RX ORDER — ACETAMINOPHEN WITH CODEINE 300MG-30MG
1 TABLET ORAL
Qty: 12 | Refills: 0
Start: 2022-10-10

## 2022-10-10 RX ORDER — ACETAMINOPHEN WITH CODEINE 300MG-30MG
1 TABLET ORAL EVERY 6 HOURS
Refills: 0 | Status: DISCONTINUED | OUTPATIENT
Start: 2022-10-10 | End: 2022-10-11

## 2022-10-10 RX ORDER — POTASSIUM PHOSPHATE, MONOBASIC POTASSIUM PHOSPHATE, DIBASIC 236; 224 MG/ML; MG/ML
15 INJECTION, SOLUTION INTRAVENOUS ONCE
Refills: 0 | Status: DISCONTINUED | OUTPATIENT
Start: 2022-10-10 | End: 2022-10-10

## 2022-10-10 RX ORDER — POTASSIUM CHLORIDE 20 MEQ
40 PACKET (EA) ORAL ONCE
Refills: 0 | Status: COMPLETED | OUTPATIENT
Start: 2022-10-10 | End: 2022-10-10

## 2022-10-10 RX ORDER — LANOLIN ALCOHOL/MO/W.PET/CERES
3 CREAM (GRAM) TOPICAL ONCE
Refills: 0 | Status: COMPLETED | OUTPATIENT
Start: 2022-10-10 | End: 2022-10-10

## 2022-10-10 RX ADMIN — Medication 1 TABLET(S): at 19:38

## 2022-10-10 RX ADMIN — Medication 1 TABLET(S): at 14:08

## 2022-10-10 RX ADMIN — HEPARIN SODIUM 5000 UNIT(S): 5000 INJECTION INTRAVENOUS; SUBCUTANEOUS at 06:37

## 2022-10-10 RX ADMIN — PANTOPRAZOLE SODIUM 40 MILLIGRAM(S): 20 TABLET, DELAYED RELEASE ORAL at 13:07

## 2022-10-10 RX ADMIN — BENZOCAINE AND MENTHOL 1 LOZENGE: 5; 1 LIQUID ORAL at 00:25

## 2022-10-10 RX ADMIN — Medication 40 MILLIEQUIVALENT(S): at 10:11

## 2022-10-10 RX ADMIN — HEPARIN SODIUM 5000 UNIT(S): 5000 INJECTION INTRAVENOUS; SUBCUTANEOUS at 22:30

## 2022-10-10 RX ADMIN — Medication 650 MILLIGRAM(S): at 06:37

## 2022-10-10 RX ADMIN — Medication 1 TABLET(S): at 13:08

## 2022-10-10 RX ADMIN — Medication 1 TABLET(S): at 20:38

## 2022-10-10 RX ADMIN — Medication 650 MILLIGRAM(S): at 00:30

## 2022-10-10 RX ADMIN — POTASSIUM PHOSPHATE, MONOBASIC POTASSIUM PHOSPHATE, DIBASIC 62.5 MILLIMOLE(S): 236; 224 INJECTION, SOLUTION INTRAVENOUS at 09:17

## 2022-10-10 RX ADMIN — HEPARIN SODIUM 5000 UNIT(S): 5000 INJECTION INTRAVENOUS; SUBCUTANEOUS at 13:08

## 2022-10-10 RX ADMIN — Medication 3 MILLIGRAM(S): at 00:32

## 2022-10-10 NOTE — DISCHARGE NOTE PROVIDER - HOSPITAL COURSE
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 She recieved adjuvant therapy and was under observation until 12/2021 when 2 hepatic lesions was seen on imaging so she was started on Folfiri and Erbitux for recurrent metastatic colon cancer. On 3/16/22, underwent a PETCT which showed moderate decrease in size of 2 lesions, + P1K3CA, BRCA2, TP53 mutation with EGFR amplification. On 10/6/22 she under RA converted to open partial liver resection. She was subsequently admitted to telemetry unit for hemodynamic monitoring.  The intraoperative EBL was 600ccs. She required 1uPRBC postoperatively and an additional 1 uPRBC on 10/8.  She responded well. Her diet was advanced as tolerated. Pain was controlled.  Patient was voiding spontaneously and ambulating independently. At time of discharge patient was vitally and hemodynamically stable, she was tolerating her diet and having bowel function.     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 She recieved adjuvant therapy and was under observation until 12/2021 when 2 hepatic lesions was seen on imaging so she was started on Folfiri and Erbitux for recurrent metastatic colon cancer. On 3/16/22, underwent a PETCT which showed moderate decrease in size of 2 lesions, + P1K3CA, BRCA2, TP53 mutation with EGFR amplification. On 10/6/22 she under RA converted to open partial liver resection. She was subsequently admitted to telemetry unit for hemodynamic monitoring.  The intraoperative EBL was 600ccs. She required 1uPRBC postoperatively and an additional 1 uPRBC on 10/8.  She responded well. Her diet was advanced as tolerated. Pain was controlled.  Patient was voiding spontaneously and ambulating independently. At time of discharge patient was vitally and hemodynamically stable, she was tolerating her diet and passing flatus. Pain was well controlled. Decision was made with patient for discharge home. Patient will be discharged home with pain medication and stool softener. She will follow up with Dr. Jimenez this week for staple removal and post operative visit.

## 2022-10-10 NOTE — DISCHARGE NOTE PROVIDER - NSDCFUADDINST_GEN_ALL_CORE_FT
Pain control:  Please take 1 tab Tylenol with codeine every 6 hours as needed for pain.    Medications:  Please continue to take all of your other medications as prescribed.     Incision care:  Please monitor your incision sites for any sings of infection including redness, swelling, discharge, or increased pain. If you experience any of these please contact your doctor.  Please avoid swimming and bathing until your follow up appointment. You may shower, allow soap and water to flow over the wound, do not scrub, pat dry when done.    Please call your doctor if you experience any of the following:  -New chest pain, pressure, squeezing or tightness.  -New or worsening cough, shortness of breath or wheeze.  -If you are vomiting and cannot keep down medications.  -You see blood or dark black material when vomiting or moving your bowels.  -You experience burning when you urinate, blood in your urine, or experience discharge.  -Your pain is getting worse changes location or moves to your chest or back.  -You have shaking, chills, or fever greater than 100.5F or 38C.  -Any changes in your symptoms that concern you.   Pain control:  Please take 1 tab Tylenol with codeine every 6 hours as needed for pain.    Medications:  Please continue to take all of your other medications as prescribed.  You have been prescribed a stool softener as well. Please take as directed, as needed.    Incision care:  Please monitor your incision sites for any sings of infection including redness, swelling, discharge, or increased pain. If you experience any of these please contact your doctor.  Please avoid swimming and bathing until your follow up appointment. You may shower, allow soap and water to flow over the wound, do not scrub, pat dry when done.    Please call your doctor if you experience any of the following:  -New chest pain, pressure, squeezing or tightness.  -New or worsening cough, shortness of breath or wheeze.  -If you are vomiting and cannot keep down medications.  -You see blood or dark black material when vomiting or moving your bowels.  -You experience burning when you urinate, blood in your urine, or experience discharge.  -Your pain is getting worse changes location or moves to your chest or back.  -You have shaking, chills, or fever greater than 100.5F or 38C.  -Any changes in your symptoms that concern you.

## 2022-10-10 NOTE — DISCHARGE NOTE PROVIDER - NSDCCPCAREPLAN_GEN_ALL_CORE_FT
PRINCIPAL DISCHARGE DIAGNOSIS  Diagnosis: Metastatic colon cancer to liver  Assessment and Plan of Treatment: Please follow up with your surgeon, Dr. Jimenez, in 1-2 weeks. Please call the number provided to schedule your appointment with his office.

## 2022-10-10 NOTE — PROGRESS NOTE ADULT - SUBJECTIVE AND OBJECTIVE BOX
STATUS POST:  Liver Resection    POST OPERATIVE DAY #: 4    SUBJECTIVE:  seen and examined at bedside with surgery team this morning, she feels well; her pain is well-controlled. No nausea or vomiting. tolerating her diet. Passing flatus, no BMs. No acute complaints.      MEDICATIONS  (STANDING):  heparin   Injectable 5000 Unit(s) SubCutaneous every 8 hours  pantoprazole  Injectable 40 milliGRAM(s) IV Push daily  potassium phosphate IVPB 15 milliMole(s) IV Intermittent once    MEDICATIONS  (PRN):  acetaminophen  300 mG/codeine 30 mG 1 Tablet(s) Oral every 6 hours PRN Mild Pain (1 - 3)  benzocaine 15 mG/menthol 3.6 mG Lozenge 1 Lozenge Oral every 6 hours PRN Sore Throat  HYDROmorphone  Injectable 0.5 milliGRAM(s) IV Push every 6 hours PRN Severe breakthrough pain  naloxone Injectable 0.1 milliGRAM(s) IV Push every 3 minutes PRN For ANY of the following changes in patient status:  A. RR LESS THAN 10 breaths per minute, B. Oxygen saturation LESS THAN 90%, C. Sedation score of 6  ondansetron Injectable 4 milliGRAM(s) IV Push every 6 hours PRN Nausea      Vital Signs Last 24 Hrs  T(C): 37.6 (10 Oct 2022 04:37), Max: 37.6 (10 Oct 2022 04:37)  T(F): 99.7 (10 Oct 2022 04:37), Max: 99.7 (10 Oct 2022 04:37)  HR: 86 (10 Oct 2022 04:40) (86 - 108)  BP: 123/64 (10 Oct 2022 04:40) (123/64 - 141/69)  BP(mean): 86 (10 Oct 2022 04:40) (86 - 97)  RR: 19 (10 Oct 2022 04:40) (18 - 20)  SpO2: 96% (10 Oct 2022 04:40) (91% - 96%)  Patient On (Oxygen Delivery Method): room air        PHYSICAL EXAM:      Constitutional: A&Ox3    Respiratory: non labored breathing, no respiratory distress    Cardiovascular: NSR, RRR    Gastrointestinal: soft, ND, appropriate incisional ttp, no R/G, incision c/d/i    Genitourinary: voiding    Extremities: (-) edema, -ttp                  I&O's Detail    09 Oct 2022 07:01  -  10 Oct 2022 07:00  --------------------------------------------------------  IN:    IV PiggyBack: 250 mL    Lactated Ringers: 480 mL    Oral Fluid: 240 mL  Total IN: 970 mL    OUT:    Voided (mL): 3150 mL  Total OUT: 3150 mL    Total NET: -2180 mL          LABS:                        8.0    11.63 )-----------( 195      ( 10 Oct 2022 05:30 )             24.5     10-10    142  |  106  |  5<L>  ----------------------------<  108<H>  3.0<L>   |  25  |  0.53    Ca    8.5      10 Oct 2022 05:30  Phos  3.1     10-10  Mg     2.1     10-10    TPro  5.8<L>  /  Alb  3.0<L>  /  TBili  0.5  /  DBili  x   /  AST  168<H>  /  ALT  290<H>  /  AlkPhos  103  10-09

## 2022-10-10 NOTE — DISCHARGE NOTE PROVIDER - CARE PROVIDER_API CALL
Pedro Jimenez)  Surgery  [address] [address]  Phone: [phone]  Fax: [phone]  Follow Up Time:

## 2022-10-10 NOTE — DISCHARGE NOTE PROVIDER - NSDCCPTREATMENT_GEN_ALL_CORE_FT
PRINCIPAL PROCEDURE  Procedure: Open partial resection of liver  Findings and Treatment: Pain control:  Please take 1 tab tylenol with codeine every 6 hours as needed for pain.  Medications:  Please continue to take all of your other home medications as prescribed.   Incision care:  Please monitor your incision sites for any sings of infection including redness, swelling, discharge, or increased pain. If you experience any of these please contact your doctor.  Please avoid swimming and bathing until your follow up appointment. You may shower, allow soap and water to flow over the wound, do not scrub, pat dry when done.  Please call your doctor if you experience any of the following:  -New chest pain, pressure, squeezing or tightness.  -New or worsening cough, shortness of breath or wheeze.  -If you are vomiting and cannot keep down medications.  -You see blood or dark black material when vomiting or moving your bowels.  -You experience burning when you urinate, blood in your urine, or experience discharge.  -Your pain is getting worse changes location or moves to your chest or back.  -You have shaking, chills, or fever greater than 100.5F or 38C.  -Any changes in your symptoms that concern you.

## 2022-10-10 NOTE — DISCHARGE NOTE PROVIDER - NSDCMRMEDTOKEN_GEN_ALL_CORE_FT
codeine-acetaminophen 30 mg-300 mg oral tablet: 1 tab(s) orally every 6 hours, As Needed -for severe pain MDD:4 tabs  gabapentin 300 mg oral capsule: orally prn  omeprazole 40 mg oral delayed release capsule: 1 cap(s) orally once a day   codeine-acetaminophen 30 mg-300 mg oral tablet: 1 tab(s) orally every 6 hours, As Needed -for severe pain MDD:4 tabs  Colace 100 mg oral capsule: 1 cap(s) orally 2 times a day   gabapentin 300 mg oral capsule: orally prn  omeprazole 40 mg oral delayed release capsule: 1 cap(s) orally once a day

## 2022-10-10 NOTE — DISCHARGE NOTE PROVIDER - NSDCFUADDAPPT_GEN_ALL_CORE_FT
Please follow up with your surgeon, Dr. Jimenez, in 1-2 weeks.  Please call his office at [phone] to schedule your appointment with his office. Please follow up with your surgeon, Dr. Jimenez, in ~1 week for staple removal and post operative visit.  Please call his office at [phone] to schedule your appointment with his office.    [phone]

## 2022-10-11 ENCOUNTER — TRANSCRIPTION ENCOUNTER (OUTPATIENT)
Age: 57
End: 2022-10-11

## 2022-10-11 VITALS — WEIGHT: 181 LBS

## 2022-10-11 LAB
ALBUMIN SERPL ELPH-MCNC: 2.9 G/DL — LOW (ref 3.3–5)
ALP SERPL-CCNC: 165 U/L — HIGH (ref 40–120)
ALT FLD-CCNC: 139 U/L — HIGH (ref 10–45)
ANION GAP SERPL CALC-SCNC: 11 MMOL/L — SIGNIFICANT CHANGE UP (ref 5–17)
AST SERPL-CCNC: 44 U/L — HIGH (ref 10–40)
BILIRUB SERPL-MCNC: 0.6 MG/DL — SIGNIFICANT CHANGE UP (ref 0.2–1.2)
BUN SERPL-MCNC: 7 MG/DL — SIGNIFICANT CHANGE UP (ref 7–23)
CALCIUM SERPL-MCNC: 8.4 MG/DL — SIGNIFICANT CHANGE UP (ref 8.4–10.5)
CHLORIDE SERPL-SCNC: 108 MMOL/L — SIGNIFICANT CHANGE UP (ref 96–108)
CO2 SERPL-SCNC: 24 MMOL/L — SIGNIFICANT CHANGE UP (ref 22–31)
CREAT SERPL-MCNC: 0.58 MG/DL — SIGNIFICANT CHANGE UP (ref 0.5–1.3)
EGFR: 107 ML/MIN/1.73M2 — SIGNIFICANT CHANGE UP
GLUCOSE SERPL-MCNC: 98 MG/DL — SIGNIFICANT CHANGE UP (ref 70–99)
HCT VFR BLD CALC: 25.9 % — LOW (ref 34.5–45)
HGB BLD-MCNC: 8.2 G/DL — LOW (ref 11.5–15.5)
MAGNESIUM SERPL-MCNC: 1.8 MG/DL — SIGNIFICANT CHANGE UP (ref 1.6–2.6)
MCHC RBC-ENTMCNC: 27.6 PG — SIGNIFICANT CHANGE UP (ref 27–34)
MCHC RBC-ENTMCNC: 31.7 GM/DL — LOW (ref 32–36)
MCV RBC AUTO: 87.2 FL — SIGNIFICANT CHANGE UP (ref 80–100)
NRBC # BLD: 0 /100 WBCS — SIGNIFICANT CHANGE UP (ref 0–0)
PHOSPHATE SERPL-MCNC: 3.7 MG/DL — SIGNIFICANT CHANGE UP (ref 2.5–4.5)
PLATELET # BLD AUTO: 219 K/UL — SIGNIFICANT CHANGE UP (ref 150–400)
POTASSIUM SERPL-MCNC: 3.3 MMOL/L — LOW (ref 3.5–5.3)
POTASSIUM SERPL-SCNC: 3.3 MMOL/L — LOW (ref 3.5–5.3)
PROT SERPL-MCNC: 5.8 G/DL — LOW (ref 6–8.3)
RBC # BLD: 2.97 M/UL — LOW (ref 3.8–5.2)
RBC # FLD: 15.9 % — HIGH (ref 10.3–14.5)
SODIUM SERPL-SCNC: 143 MMOL/L — SIGNIFICANT CHANGE UP (ref 135–145)
WBC # BLD: 10.44 K/UL — SIGNIFICANT CHANGE UP (ref 3.8–10.5)
WBC # FLD AUTO: 10.44 K/UL — SIGNIFICANT CHANGE UP (ref 3.8–10.5)

## 2022-10-11 PROCEDURE — 88309 TISSUE EXAM BY PATHOLOGIST: CPT

## 2022-10-11 PROCEDURE — 80053 COMPREHEN METABOLIC PANEL: CPT

## 2022-10-11 PROCEDURE — 88302 TISSUE EXAM BY PATHOLOGIST: CPT

## 2022-10-11 PROCEDURE — 85027 COMPLETE CBC AUTOMATED: CPT

## 2022-10-11 PROCEDURE — 80048 BASIC METABOLIC PNL TOTAL CA: CPT

## 2022-10-11 PROCEDURE — 84100 ASSAY OF PHOSPHORUS: CPT

## 2022-10-11 PROCEDURE — 88341 IMHCHEM/IMCYTCHM EA ADD ANTB: CPT

## 2022-10-11 PROCEDURE — 86850 RBC ANTIBODY SCREEN: CPT

## 2022-10-11 PROCEDURE — P9016: CPT

## 2022-10-11 PROCEDURE — 84132 ASSAY OF SERUM POTASSIUM: CPT

## 2022-10-11 PROCEDURE — 36430 TRANSFUSION BLD/BLD COMPNT: CPT

## 2022-10-11 PROCEDURE — 86901 BLOOD TYPING SEROLOGIC RH(D): CPT

## 2022-10-11 PROCEDURE — 83735 ASSAY OF MAGNESIUM: CPT

## 2022-10-11 PROCEDURE — 82330 ASSAY OF CALCIUM: CPT

## 2022-10-11 PROCEDURE — C1889: CPT

## 2022-10-11 PROCEDURE — 85018 HEMOGLOBIN: CPT

## 2022-10-11 PROCEDURE — C9399: CPT

## 2022-10-11 PROCEDURE — 85610 PROTHROMBIN TIME: CPT

## 2022-10-11 PROCEDURE — P9045: CPT

## 2022-10-11 PROCEDURE — 71045 X-RAY EXAM CHEST 1 VIEW: CPT

## 2022-10-11 PROCEDURE — 86900 BLOOD TYPING SEROLOGIC ABO: CPT

## 2022-10-11 PROCEDURE — 82947 ASSAY GLUCOSE BLOOD QUANT: CPT

## 2022-10-11 PROCEDURE — 88331 PATH CONSLTJ SURG 1 BLK 1SPC: CPT

## 2022-10-11 PROCEDURE — 85730 THROMBOPLASTIN TIME PARTIAL: CPT

## 2022-10-11 PROCEDURE — 86923 COMPATIBILITY TEST ELECTRIC: CPT

## 2022-10-11 PROCEDURE — 84295 ASSAY OF SERUM SODIUM: CPT

## 2022-10-11 PROCEDURE — 88342 IMHCHEM/IMCYTCHM 1ST ANTB: CPT

## 2022-10-11 PROCEDURE — 88307 TISSUE EXAM BY PATHOLOGIST: CPT

## 2022-10-11 PROCEDURE — 36415 COLL VENOUS BLD VENIPUNCTURE: CPT

## 2022-10-11 RX ORDER — ACETAMINOPHEN 500 MG
650 TABLET ORAL ONCE
Refills: 0 | Status: COMPLETED | OUTPATIENT
Start: 2022-10-11 | End: 2022-10-11

## 2022-10-11 RX ORDER — DOCUSATE SODIUM 100 MG
1 CAPSULE ORAL
Qty: 30 | Refills: 0
Start: 2022-10-11 | End: 2022-10-25

## 2022-10-11 RX ORDER — MAGNESIUM SULFATE 500 MG/ML
1 VIAL (ML) INJECTION ONCE
Refills: 0 | Status: COMPLETED | OUTPATIENT
Start: 2022-10-11 | End: 2022-10-11

## 2022-10-11 RX ORDER — POTASSIUM CHLORIDE 20 MEQ
40 PACKET (EA) ORAL ONCE
Refills: 0 | Status: COMPLETED | OUTPATIENT
Start: 2022-10-11 | End: 2022-10-11

## 2022-10-11 RX ADMIN — Medication 650 MILLIGRAM(S): at 04:07

## 2022-10-11 RX ADMIN — HEPARIN SODIUM 5000 UNIT(S): 5000 INJECTION INTRAVENOUS; SUBCUTANEOUS at 05:38

## 2022-10-11 RX ADMIN — Medication 650 MILLIGRAM(S): at 05:00

## 2022-10-11 RX ADMIN — Medication 40 MILLIEQUIVALENT(S): at 09:23

## 2022-10-11 RX ADMIN — Medication 100 GRAM(S): at 09:22

## 2022-10-11 NOTE — DIETITIAN INITIAL EVALUATION ADULT - PERSON TAUGHT/METHOD
discussed low fat diet with pt for s/p partial liver resection/verbal instruction/patient instructed

## 2022-10-11 NOTE — DISCHARGE NOTE NURSING/CASE MANAGEMENT/SOCIAL WORK - NSDCFUADDAPPT_GEN_ALL_CORE_FT
Please follow up with your surgeon, Dr. Jimenez, in ~1 week for staple removal and post operative visit.  Please call his office at [phone] to schedule your appointment with his office.    [phone]

## 2022-10-11 NOTE — DIETITIAN INITIAL EVALUATION ADULT - ADD RECOMMEND
1. Regular diet, rec change to low fat diet  >> honor pt preferences as able  2. Monitor %PO intake, encourage PO  3. BM and pain regimen per team, consider BM regimen as pt w/o BM since >/=5 days ago  4. Monitor BMP, BG, POCT, renal indices, LFTs, lytes, replete prn  5. diet edu prn

## 2022-10-11 NOTE — PROGRESS NOTE ADULT - REASON FOR ADMISSION
partial liver resection

## 2022-10-11 NOTE — DIETITIAN INITIAL EVALUATION ADULT - PERTINENT MEDS FT
MEDICATIONS  (STANDING):  heparin   Injectable 5000 Unit(s) SubCutaneous every 8 hours  pantoprazole  Injectable 40 milliGRAM(s) IV Push daily    MEDICATIONS  (PRN):  acetaminophen  300 mG/codeine 30 mG 1 Tablet(s) Oral every 6 hours PRN Mild Pain (1 - 3)  benzocaine 15 mG/menthol 3.6 mG Lozenge 1 Lozenge Oral every 6 hours PRN Sore Throat  naloxone Injectable 0.1 milliGRAM(s) IV Push every 3 minutes PRN For ANY of the following changes in patient status:  A. RR LESS THAN 10 breaths per minute, B. Oxygen saturation LESS THAN 90%, C. Sedation score of 6  ondansetron Injectable 4 milliGRAM(s) IV Push every 6 hours PRN Nausea

## 2022-10-11 NOTE — DIETITIAN INITIAL EVALUATION ADULT - PERTINENT LABORATORY DATA
10-11    143  |  108  |  7   ----------------------------<  98  3.3<L>   |  24  |  0.58    Ca    8.4      11 Oct 2022 05:30  Phos  3.7     10-11  Mg     1.8     10-11    TPro  5.8<L>  /  Alb  2.9<L>  /  TBili  0.6  /  DBili  x   /  AST  44<H>  /  ALT  139<H>  /  AlkPhos  165<H>  10-11

## 2022-10-11 NOTE — DISCHARGE NOTE NURSING/CASE MANAGEMENT/SOCIAL WORK - NSDCPEFALRISK_GEN_ALL_CORE
For information on Fall & Injury Prevention, visit: [email or web address] OR  [email or web address] OR  https://www.cdc.gov/steadi/patient.html

## 2022-10-11 NOTE — PROGRESS NOTE ADULT - SUBJECTIVE AND OBJECTIVE BOX
STATUS POST:  Open partial resection of liver    POST OPERATIVE DAY #: 5    SUBJECTIVE: Pt seen and examined by chief resident. Pt is doing well, resting comfortably on bed. Pain overall better controlled overnight. Tolerating diet but reporting taking little in PO because not feeling very hungry. Ambulating out of bed. No nausea or vomiting. No complaints at this time.    Vital Signs Last 24 Hrs  T(C): 36.7 (11 Oct 2022 04:26), Max: 37 (10 Oct 2022 09:08)  T(F): 98.1 (11 Oct 2022 04:26), Max: 98.6 (10 Oct 2022 09:08)  HR: 77 (11 Oct 2022 04:26) (73 - 93)  BP: 126/78 (11 Oct 2022 04:26) (103/64 - 135/78)  BP(mean): 81 (10 Oct 2022 08:45) (81 - 81)  RR: 17 (11 Oct 2022 04:26) (17 - 17)  SpO2: 96% (11 Oct 2022 04:26) (95% - 96%)    Parameters below as of 11 Oct 2022 04:26  Patient On (Oxygen Delivery Method): room air        I&O's Summary    10 Oct 2022 07:01  -  11 Oct 2022 07:00  --------------------------------------------------------  IN: 240 mL / OUT: 1450 mL / NET: -1210 mL        Physical Exam:  General Appearance: Appears well, NAD  Pulmonary: Nonlabored breathing, no respiratory distress  Abdomen: Soft, nondisteded, appropriate incisional tenderness, incisions with staples clean and dry and intact  Extremities: WWP, SCD's in place     LABS:                        8.0    11.63 )-----------( 195      ( 10 Oct 2022 05:30 )             24.5     10-10    142  |  106  |  5<L>  ----------------------------<  108<H>  3.0<L>   |  25  |  0.53    Ca    8.5      10 Oct 2022 05:30  Phos  3.1     10-10  Mg     2.1     10-10

## 2022-10-11 NOTE — DIETITIAN INITIAL EVALUATION ADULT - OTHER INFO
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 She recieved adjuvant therapy and was under observation until 12/2021 when 2 hepatic lesions was seen on imaging so she was started on Folfiri and Erbitux for recurrent metastatic colon cancer. On 3/16/22, underwent a PETCT which showed moderate decrease in size of 2 lesions, + P1K3CA, BRCA2, TP53 mutation with EGFR amplification. Now s/p RA converted to open partial liver resection on 10/6.    Pt seen resting in bed, denies any n/v/d/c. Last BM 10/5. No BM since surgery but +Flatus. Not on any BM regimens. Denies wt change or changes in PO intake PTA. No cultural, ethnic, [employer] food preferences noted. NKFA. Endorses lack of appetite at this time, reports dislike of foods offered here, does not enjoy taste of meals despite trying many different options on menu. Currently on a regular diet without restrictions. Reports her friend will bring her something to eat when she comes to visit. Encourage PO intake as able. Provided diet edu for s/p liver resection, aiming for low fat/non fat items in the mean time, reviewed sources of fat. Pt reports will also abstain from alcohol beverages s/p surgery. Noted head pain 6/10. Skin: judy 20, surgical incision noted, no PU or edema. RD to follow.

## 2022-10-11 NOTE — DISCHARGE NOTE NURSING/CASE MANAGEMENT/SOCIAL WORK - PATIENT PORTAL LINK FT
You can access the FollowMyHealth Patient Portal offered by [facility] by registering at the following website: [email or web address]. By joining [facility]’s FollowMyHealth portal, you will also be able to view your health information using other applications (apps) compatible with our system.

## 2022-10-11 NOTE — PROGRESS NOTE ADULT - ASSESSMENT
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 She recieved adjuvant therapy and was under observation until 12/2021 when 2 hepatic lesions was seen on imaging so she was started on Folfiri and Erbitux for recurrent metastatic colon cancer. On 3/16/22, underwent a PETCT which showed moderate decrease in size of 2 lesions, + P1K3CA, BRCA2, TP53 mutation with EGFR amplification. Now s/p RA converted to open partial liver resection on 10/6.     reg diet  pain/nausea prn  SQH for DVT ppx  SCDs, OOBA, IS  PPI

## 2022-10-11 NOTE — DIETITIAN INITIAL EVALUATION ADULT - OTHER CALCULATIONS
lbs. %%. IBW used to calculate energy needs due to pt's current body weight exceeding 120% of IBW. Needs adjusted for age and wt. Post op demands, Cancer

## 2022-10-12 PROBLEM — R16.0 HEPATOMEGALY, NOT ELSEWHERE CLASSIFIED: Chronic | Status: ACTIVE | Noted: 2022-10-05

## 2022-10-12 PROBLEM — N20.0 CALCULUS OF KIDNEY: Chronic | Status: ACTIVE | Noted: 2022-10-05

## 2022-10-12 LAB — SURGICAL PATHOLOGY STUDY: SIGNIFICANT CHANGE UP

## 2022-10-17 ENCOUNTER — APPOINTMENT (OUTPATIENT)
Dept: SURGICAL ONCOLOGY | Facility: CLINIC | Age: 57
End: 2022-10-17

## 2022-10-19 ENCOUNTER — APPOINTMENT (OUTPATIENT)
Dept: SURGICAL ONCOLOGY | Facility: CLINIC | Age: 57
End: 2022-10-19

## 2022-10-19 VITALS
TEMPERATURE: 98 F | HEART RATE: 116 BPM | OXYGEN SATURATION: 98 % | SYSTOLIC BLOOD PRESSURE: 121 MMHG | BODY MASS INDEX: 28.09 KG/M2 | HEIGHT: 67 IN | WEIGHT: 179 LBS | DIASTOLIC BLOOD PRESSURE: 83 MMHG

## 2022-10-19 PROCEDURE — 99024 POSTOP FOLLOW-UP VISIT: CPT

## 2022-10-19 RX ORDER — AZITHROMYCIN 250 MG/1
250 TABLET, FILM COATED ORAL
Qty: 6 | Refills: 0 | Status: DISCONTINUED | COMMUNITY
Start: 2022-05-03

## 2022-10-19 RX ORDER — AMOXICILLIN AND CLAVULANATE POTASSIUM 875; 125 MG/1; MG/1
875-125 TABLET, COATED ORAL
Qty: 10 | Refills: 0 | Status: DISCONTINUED | COMMUNITY
Start: 2022-05-06

## 2022-10-19 RX ORDER — BISACODYL 5 MG/1
5 TABLET ORAL
Qty: 4 | Refills: 0 | Status: DISCONTINUED | COMMUNITY
Start: 2022-09-08

## 2022-10-19 RX ORDER — OXYCODONE AND ACETAMINOPHEN 5; 325 MG/1; MG/1
5-325 TABLET ORAL
Qty: 50 | Refills: 0 | Status: DISCONTINUED | COMMUNITY
Start: 2022-06-03

## 2022-10-19 RX ORDER — POLYETHYLENE GLYCOL-3350 AND ELECTROLYTES 236; 6.74; 5.86; 2.97; 22.74 G/274.31G; G/274.31G; G/274.31G; G/274.31G; G/274.31G
236 POWDER, FOR SOLUTION ORAL
Qty: 4000 | Refills: 0 | Status: DISCONTINUED | COMMUNITY
Start: 2022-09-08

## 2022-10-19 RX ORDER — ACETAMINOPHEN AND CODEINE 300; 30 MG/1; MG/1
300-30 TABLET ORAL
Qty: 12 | Refills: 0 | Status: DISCONTINUED | COMMUNITY
Start: 2022-10-10

## 2022-10-19 RX ORDER — NIRMATRELVIR AND RITONAVIR 300-100 MG
20 X 150 MG & KIT ORAL
Qty: 30 | Refills: 0 | Status: DISCONTINUED | COMMUNITY
Start: 2022-08-09

## 2022-10-19 RX ORDER — EPOETIN ALFA-EPBX 40000 [IU]/ML
40000 INJECTION, SOLUTION INTRAVENOUS; SUBCUTANEOUS
Qty: 4 | Refills: 0 | Status: DISCONTINUED | COMMUNITY
Start: 2021-12-09

## 2022-10-19 RX ORDER — TRIAMCINOLONE ACETONIDE 0.25 MG/G
0.03 CREAM TOPICAL
Qty: 15 | Refills: 0 | Status: DISCONTINUED | COMMUNITY
Start: 2022-03-23

## 2022-10-19 NOTE — HISTORY OF PRESENT ILLNESS
[FreeTextEntry1] : Patient Name: AUGUSTINA MARES \par MRN: [id number] \par [place] MRN:\par Referring Provider: Dr. Oziel Paulson\par Oncologist: Dr. Paulson\par Date: 10/19/2022 \par \par Diagnosis: colon cancer, metastatic to liver\par Operative Date: (1) 7/11/18 (2) 10/6/22\par Procedure: (1) Robotic sigmoid colon resection (@[facility]) (2) Diagnostic lap, Open partial liver resection, Repair of incisional hernia\par Pathology: (1) [id number]\par \par She presents for a scheduled post operative visit. She is 13 days post op.Surgery was uneventful. Discharged home POD 5.\par \par Currently, Ms. MARES has right sided abdominal pain and discomfort which is unrelieved with over the counter Tylenol. Tylenol with Codeine did not relieve her pain either. Pain is present when moving from sitting to standing and standing to sitting She denies having fevers, chills, or night sweats. She is tolerating a regular diet and is having regular bowel movements. Pain is not well controlled. She is requesting an Rx for Oxycodone and declines having a true allergy to oxycodone. She reports that last time she took it, she hadn't eaten and she developed a headache and nausea. \par \par Final Pathology Showed: metastatic adenocarcinoma, resection margin negative\par \par

## 2022-10-19 NOTE — REASON FOR VISIT
[Friend] : friend [de-identified] : open liver resection, repair of incisional hernia [de-identified] : 10/6/22 [de-identified] : 15

## 2022-10-19 NOTE — PHYSICAL EXAM
[Normal] : well developed, well nourished, in no acute distress [de-identified] : soft, non tender, staples removed without difficulty

## 2022-10-19 NOTE — ASSESSMENT
[FreeTextEntry1] : I) normal postop\par \par P) Reviewed pathology. Will follow up w Dr Paulson. Will see us again in 3 months after imaging. Will be observed for now as had 12 cycles of preop chemo. Will renew pain meds. All questions answered.\par \par Pedro Jimenez MD\par \par Chief Surgical Oncology\par Multidisciplinary GI cancer program\par [facility]\par [facility]\par \par Professor Surgery\par [employer]\par \par cc Dr Oziel Paulson

## 2023-01-09 ENCOUNTER — APPOINTMENT (OUTPATIENT)
Dept: SURGICAL ONCOLOGY | Facility: CLINIC | Age: 58
End: 2023-01-09

## 2023-01-10 ENCOUNTER — APPOINTMENT (OUTPATIENT)
Dept: SURGICAL ONCOLOGY | Facility: CLINIC | Age: 58
End: 2023-01-10

## 2023-03-27 ENCOUNTER — APPOINTMENT (OUTPATIENT)
Dept: SURGICAL ONCOLOGY | Facility: CLINIC | Age: 58
End: 2023-03-27
Payer: MEDICAID

## 2023-03-27 VITALS
SYSTOLIC BLOOD PRESSURE: 125 MMHG | WEIGHT: 185 LBS | BODY MASS INDEX: 29.03 KG/M2 | OXYGEN SATURATION: 93 % | HEIGHT: 67 IN | TEMPERATURE: 98.3 F | DIASTOLIC BLOOD PRESSURE: 86 MMHG | HEART RATE: 103 BPM

## 2023-03-27 PROCEDURE — 99214 OFFICE O/P EST MOD 30 MIN: CPT

## 2023-03-27 NOTE — ASSESSMENT
[FreeTextEntry1] : I) Recurrent metastatic disease\par \par P) Long discussion about findings. Has new disease in the liver and extrahepatic. Explained the biology of the situation. Discussed w Dr Paulson (oncologist) plan for systemic therapy first and based on response decide what role surgical intervention may play (if any). Advised new MRI as well. Will see Dr Paulson to restart systemic therapy. All questions answered.\par \par Pedro Jimenez MD\par \par Chief Surgical Oncology\par Multidisciplinary GI cancer program\par [facility] [place]\par [facility]\par \par Professor Surgery\par [employer]\par \par cc Dr LUCIANO Paulson\par

## 2023-03-27 NOTE — HISTORY OF PRESENT ILLNESS
[de-identified] : Patient Name: AUGUSTINA MARES \par MRN: [id number] \par Referring Provider: Dr. Oziel Paulson\par Oncologist: Dr. Paulson\par Date: 3/27/23\par \par Diagnosis: colon cancer\par Operative Date: (1) 7/11/2018 (2) 10/6/22\par Procedure: (1) Robotic sigmoid colon resection (@ [facility]) (2) Diagnostic lap, open partial liver resection, repair of incisional hernia \par Pathology: (1) [id number] (2) metastatic adenocarcinoma, resection margin negative\par \par 57 year female  presents for follow up of a liver mass in the setting of colon cancer. She is 5 months s/p liver resection.\par \par 4/2018 - colonoscopy with Dr. Viramontes revealed a descending colon mass; HGD arising from large TVA\par 6/2018 - colonoscopy with Dr. Rachele Perkins, revealed a 4 cm mass in the sigmoid; invasive adenocarcinoma in the background of a tubular adenoma. CT CAP had no mets.\par 7/11/2018 - s/p robotic sigmoid resection at [facility]; T3N1b\par 8/2018 - received adjuvant therapy and was under observation until 12/2021\par 12/2021 - she had 2 hepatic lesions seen on imaging\par 12/2021 - started on FOLFIRI for recurrent metastatic colon cancer (+Erbitux)\par 2/4/22 CEA 4.7\par 2/23/22 - CEA 2.7\par 3/16/22 - PETCT showed moderate decrease in size of 2 lesions.\par + P1K3CA, BRCA2, TP53 mutation with EGFR amplification\par 6/22 - completed FOLFRI/Erbitux\par 7/25/22 - PETCT showed decrease in size of liver mets. She was referred for an MRI\par 8/31/22 - MRI was done at [facility]\par 10/06/22 - s/p open partial resection of liver segments 4 and 6. Negative margins on pathology\par 10/19/22 - normal post operative visit. pain control renewed \par 3/15/23 - PET/CT revealed 2 new area of hypermetabolic activity within R lobe of the liver (3.4 x 3 cm and 1.4 x 1.3 cm) with hypermetabolic nodularity in L peritoneal reflection\par \par Currently, Ms. MARES reports she is doing well clinically today. She denies abdominal pain and discomfort, denies having decreased appetite, and denies nausea or vomiting. She denies changes in bowel habits and denies recent unintentional weight loss. She denies fevers, chills, or night sweats. States she has been feeling well since surgery in October and has been gaining weight. States she needs to alter her diet. Denies recurrent pain from prior procedure. \par \par Functional Status: Ms. MARES is able to walk up 3 flights of stairs without fatigue or dyspnea.\par

## 2023-03-27 NOTE — RESULTS/DATA
[FreeTextEntry1] : Labs: none new\par \par \par \par Imaging: PET CT 3/15/2023 2  liver lesion, left side abdominal activity, cardiophrenic lesion (all new)\par \par \par \par Diagnostic procedures: none new\par

## 2023-03-27 NOTE — PHYSICAL EXAM
[Normal] : supple, no neck mass and thyroid not enlarged [Normal] : oriented to person, place and time, with appropriate affect [de-identified] : soft, nondistended, nontender. No masses appreciated. L upper abdominal incision healing well without evidence of infection or hernia

## 2023-04-12 ENCOUNTER — OUTPATIENT (OUTPATIENT)
Dept: OUTPATIENT SERVICES | Facility: HOSPITAL | Age: 58
LOS: 1 days | End: 2023-04-12
Payer: COMMERCIAL

## 2023-04-12 ENCOUNTER — APPOINTMENT (OUTPATIENT)
Dept: INTERVENTIONAL RADIOLOGY/VASCULAR | Facility: HOSPITAL | Age: 58
End: 2023-04-12

## 2023-04-12 DIAGNOSIS — Z90.49 ACQUIRED ABSENCE OF OTHER SPECIFIED PARTS OF DIGESTIVE TRACT: Chronic | ICD-10-CM

## 2023-04-12 DIAGNOSIS — Z98.890 OTHER SPECIFIED POSTPROCEDURAL STATES: Chronic | ICD-10-CM

## 2023-04-12 DIAGNOSIS — Z90.89 ACQUIRED ABSENCE OF OTHER ORGANS: Chronic | ICD-10-CM

## 2023-04-12 PROCEDURE — C1751: CPT

## 2023-04-12 PROCEDURE — 36573 INSJ PICC RS&I 5 YR+: CPT

## 2023-10-02 ENCOUNTER — APPOINTMENT (OUTPATIENT)
Dept: SURGICAL ONCOLOGY | Facility: CLINIC | Age: 58
End: 2023-10-02

## 2023-10-09 ENCOUNTER — APPOINTMENT (OUTPATIENT)
Dept: SURGICAL ONCOLOGY | Facility: CLINIC | Age: 58
End: 2023-10-09
Payer: MEDICAID

## 2023-10-09 VITALS
OXYGEN SATURATION: 96 % | HEIGHT: 67 IN | BODY MASS INDEX: 28.56 KG/M2 | DIASTOLIC BLOOD PRESSURE: 87 MMHG | RESPIRATION RATE: 16 BRPM | HEART RATE: 97 BPM | TEMPERATURE: 97.5 F | WEIGHT: 182 LBS | SYSTOLIC BLOOD PRESSURE: 136 MMHG

## 2023-10-09 PROCEDURE — 99214 OFFICE O/P EST MOD 30 MIN: CPT

## 2023-10-09 RX ORDER — OXYCODONE AND ACETAMINOPHEN 5; 325 MG/1; MG/1
5-325 TABLET ORAL
Qty: 20 | Refills: 0 | Status: DISCONTINUED | COMMUNITY
Start: 2022-10-19 | End: 2023-10-09

## 2023-10-09 RX ORDER — DOCUSATE SODIUM 100 MG/1
100 CAPSULE, LIQUID FILLED ORAL 3 TIMES DAILY
Qty: 30 | Refills: 0 | Status: DISCONTINUED | COMMUNITY
Start: 2022-10-19 | End: 2023-10-09

## 2023-10-10 LAB
ALBUMIN SERPL ELPH-MCNC: 4.7 G/DL
ALP BLD-CCNC: 115 U/L
ALT SERPL-CCNC: 30 U/L
ANION GAP SERPL CALC-SCNC: 13 MMOL/L
APTT BLD: 32.5 SEC
AST SERPL-CCNC: 29 U/L
BASOPHILS # BLD AUTO: 0.04 K/UL
BASOPHILS NFR BLD AUTO: 0.5 %
BILIRUB DIRECT SERPL-MCNC: 0.1 MG/DL
BILIRUB INDIRECT SERPL-MCNC: 0.2 MG/DL
BILIRUB SERPL-MCNC: 0.3 MG/DL
BUN SERPL-MCNC: 16 MG/DL
CALCIUM SERPL-MCNC: 9.8 MG/DL
CEA SERPL-MCNC: 1.9 NG/ML
CHLORIDE SERPL-SCNC: 103 MMOL/L
CO2 SERPL-SCNC: 27 MMOL/L
CREAT SERPL-MCNC: 0.81 MG/DL
EGFR: 85 ML/MIN/1.73M2
EOSINOPHIL # BLD AUTO: 0.06 K/UL
EOSINOPHIL NFR BLD AUTO: 0.7 %
GLUCOSE SERPL-MCNC: 91 MG/DL
HCT VFR BLD CALC: 45 %
HGB BLD-MCNC: 13.9 G/DL
IMM GRANULOCYTES NFR BLD AUTO: 0.2 %
INR PPP: 0.96 RATIO
LYMPHOCYTES # BLD AUTO: 2.62 K/UL
LYMPHOCYTES NFR BLD AUTO: 32.7 %
MAN DIFF?: NORMAL
MCHC RBC-ENTMCNC: 28.4 PG
MCHC RBC-ENTMCNC: 30.9 GM/DL
MCV RBC AUTO: 92 FL
MONOCYTES # BLD AUTO: 0.58 K/UL
MONOCYTES NFR BLD AUTO: 7.2 %
NEUTROPHILS # BLD AUTO: 4.69 K/UL
NEUTROPHILS NFR BLD AUTO: 58.7 %
PLATELET # BLD AUTO: 222 K/UL
POTASSIUM SERPL-SCNC: 4.5 MMOL/L
PROT SERPL-MCNC: 7.3 G/DL
PT BLD: 10.9 SEC
RBC # BLD: 4.89 M/UL
RBC # FLD: 15.9 %
SODIUM SERPL-SCNC: 143 MMOL/L
WBC # FLD AUTO: 8.01 K/UL

## 2023-10-17 ENCOUNTER — TRANSCRIPTION ENCOUNTER (OUTPATIENT)
Age: 58
End: 2023-10-17

## 2023-10-17 VITALS
WEIGHT: 183.2 LBS | DIASTOLIC BLOOD PRESSURE: 82 MMHG | TEMPERATURE: 97 F | HEART RATE: 105 BPM | OXYGEN SATURATION: 98 % | SYSTOLIC BLOOD PRESSURE: 116 MMHG | HEIGHT: 67 IN | RESPIRATION RATE: 16 BRPM

## 2023-10-17 RX ORDER — GABAPENTIN 400 MG/1
0 CAPSULE ORAL
Qty: 0 | Refills: 0 | DISCHARGE

## 2023-10-17 NOTE — ASU PATIENT PROFILE, ADULT - NSICDXPASTSURGICALHX_GEN_ALL_CORE_FT
PAST SURGICAL HISTORY:  H/O hernia repair     History of lithotripsy     History of tonsillectomy     Status post laparoscopic-assisted sigmoidectomy

## 2023-10-18 ENCOUNTER — APPOINTMENT (OUTPATIENT)
Dept: SURGICAL ONCOLOGY | Facility: HOSPITAL | Age: 58
End: 2023-10-18

## 2023-10-18 ENCOUNTER — TRANSCRIPTION ENCOUNTER (OUTPATIENT)
Age: 58
End: 2023-10-18

## 2023-10-18 ENCOUNTER — APPOINTMENT (OUTPATIENT)
Dept: GYNECOLOGIC ONCOLOGY | Facility: HOSPITAL | Age: 58
End: 2023-10-18
Payer: MEDICAID

## 2023-10-18 ENCOUNTER — OUTPATIENT (OUTPATIENT)
Dept: OUTPATIENT SERVICES | Facility: HOSPITAL | Age: 58
LOS: 1 days | Discharge: HOME | End: 2023-10-18
Payer: COMMERCIAL

## 2023-10-18 ENCOUNTER — RESULT REVIEW (OUTPATIENT)
Age: 58
End: 2023-10-18

## 2023-10-18 DIAGNOSIS — Z90.89 ACQUIRED ABSENCE OF OTHER ORGANS: Chronic | ICD-10-CM

## 2023-10-18 DIAGNOSIS — Z98.890 OTHER SPECIFIED POSTPROCEDURAL STATES: Chronic | ICD-10-CM

## 2023-10-18 DIAGNOSIS — Z90.49 ACQUIRED ABSENCE OF OTHER SPECIFIED PARTS OF DIGESTIVE TRACT: Chronic | ICD-10-CM

## 2023-10-18 PROCEDURE — 58661 LAPAROSCOPY REMOVE ADNEXA: CPT

## 2023-10-18 PROCEDURE — 88341 IMHCHEM/IMCYTCHM EA ADD ANTB: CPT | Mod: 26

## 2023-10-18 PROCEDURE — 88305 TISSUE EXAM BY PATHOLOGIST: CPT | Mod: 26

## 2023-10-18 PROCEDURE — 88307 TISSUE EXAM BY PATHOLOGIST: CPT | Mod: 26

## 2023-10-18 PROCEDURE — 49321 LAPAROSCOPY BIOPSY: CPT

## 2023-10-18 PROCEDURE — 88342 IMHCHEM/IMCYTCHM 1ST ANTB: CPT | Mod: 26

## 2023-10-18 RX ORDER — ACETAMINOPHEN WITH CODEINE 300MG-30MG
1 TABLET ORAL EVERY 6 HOURS
Refills: 0 | Status: DISCONTINUED | OUTPATIENT
Start: 2023-10-18 | End: 2023-10-19

## 2023-10-18 RX ORDER — SODIUM CHLORIDE 9 MG/ML
1000 INJECTION, SOLUTION INTRAVENOUS
Refills: 0 | Status: DISCONTINUED | OUTPATIENT
Start: 2023-10-18 | End: 2023-10-19

## 2023-10-18 RX ORDER — HYDROMORPHONE HYDROCHLORIDE 2 MG/ML
0.5 INJECTION INTRAMUSCULAR; INTRAVENOUS; SUBCUTANEOUS
Refills: 0 | Status: DISCONTINUED | OUTPATIENT
Start: 2023-10-18 | End: 2023-10-19

## 2023-10-18 RX ORDER — ACETAMINOPHEN 500 MG
1000 TABLET ORAL ONCE
Refills: 0 | Status: COMPLETED | OUTPATIENT
Start: 2023-10-18 | End: 2023-10-18

## 2023-10-18 RX ADMIN — HYDROMORPHONE HYDROCHLORIDE 0.5 MILLIGRAM(S): 2 INJECTION INTRAMUSCULAR; INTRAVENOUS; SUBCUTANEOUS at 18:51

## 2023-10-18 RX ADMIN — Medication 1000 MILLIGRAM(S): at 22:00

## 2023-10-18 RX ADMIN — Medication 400 MILLIGRAM(S): at 21:45

## 2023-10-18 RX ADMIN — HYDROMORPHONE HYDROCHLORIDE 0.5 MILLIGRAM(S): 2 INJECTION INTRAMUSCULAR; INTRAVENOUS; SUBCUTANEOUS at 22:47

## 2023-10-18 RX ADMIN — HYDROMORPHONE HYDROCHLORIDE 0.5 MILLIGRAM(S): 2 INJECTION INTRAMUSCULAR; INTRAVENOUS; SUBCUTANEOUS at 22:19

## 2023-10-18 RX ADMIN — HYDROMORPHONE HYDROCHLORIDE 0.5 MILLIGRAM(S): 2 INJECTION INTRAMUSCULAR; INTRAVENOUS; SUBCUTANEOUS at 19:10

## 2023-10-18 NOTE — ASU DISCHARGE PLAN (ADULT/PEDIATRIC) - CARE PROVIDER_API CALL
Pedro Jimenez  Surgical Oncology  [address] [address]  Phone: [phone]  Fax: [phone]  Follow Up Time: 2 weeks

## 2023-10-18 NOTE — ASU DISCHARGE PLAN (ADULT/PEDIATRIC) - ASU DC SPECIAL INSTRUCTIONSFT
You may take Tylenol and Motrin, alternating every 3 hours, for the next few days for pain. After that you may take it as needed. Tylenol with Codeine was sent to the pharmacy for you. You may take 2 tablets every 4-6 hours, as needed for pain.  You may alternate Motrin with the Tylenol as well.

## 2023-10-18 NOTE — PRE-ANESTHESIA EVALUATION ADULT - HEART RATE (BEATS/MIN)
March 9, 2018      [address]  [address] [facility] [address]        Dear ,    We are writing to inform you of your test results.    Your test results fall within the expected range(s) or remain unchanged from previous results.  Please continue with current treatment plan. Your Karyotype was normal.    Resulted Orders   Chromosome blood high resolution   Result Value Ref Range    Copath Report       Patient Name: MISTY BOSS  MR#: [id number]  Specimen #: [id number]  Collected: 2/19/2018 14:23  Received: 2/20/2018 09:24  Reported: 3/5/2018 21:22  Ordering Phy(s): ALEX MEI  Additional Phy(s): PAPA CHASE    For improved result formatting, select 'View Enhanced Report Format' under   Linked Documents section.  __________________________________________    TEST(S) REQUESTED:  Blood High Resolution Analysis    SPECIMEN DESCRIPTION:  Peripheral Blood    CLINICAL COMMENTS:  recurrent miscarriage    Metaphases analyzed:                  20  Additional metaphases screened:          0  Metaphases karyotyped:               2  Banding utilized:               G-banding  Band resolution:                     500-850    METHODS:  PHA stimulated, non-synchronized and MTX synchronized cultures.    ISCN:  46,XX    INTERPRETATION:  These findings represent a normal 46,XX female karyotype. No numerical or   structural chromosomal abnormality  was found.    Yuliya Mejias, Ph.D., [facility]   Director, Cytogenetics Laboratory    Electronically Signed Out By:  Dorene Reardon M.D., UMPhysiciansCPT Codes:  A: 14425-MJZJV, 20021-XFLPFD, 47462-TSPILH, 68157-AOKOP    TESTING LAB LOCATION:  [facility]  [address] [facility], [facility] [address]  [address] [place] [address] [phone]    COLLECTION SITE:  Client:  [facility]  Location:  [facility] (R)         If you have any questions or concerns, please call the clinic at the number listed above.        Sincerely,        Khushi Villasenor DO                 105

## 2023-10-18 NOTE — BRIEF OPERATIVE NOTE - NSICDXBRIEFPREOP_GEN_ALL_CORE_FT
PRE-OP DIAGNOSIS:  Left ovarian cyst 18-Oct-2023 17:28:30  Juan White  Metastatic colon cancer to liver 18-Oct-2023 17:49:58  Jeromy Carter  
PRE-OP DIAGNOSIS:  Left ovarian cyst 18-Oct-2023 17:28:30  Juan White

## 2023-10-18 NOTE — BRIEF OPERATIVE NOTE - OPERATION/FINDINGS
Gyn portion of a larger case. Laparoscopic entry via Orozco technique. Left ovary enlarged to 4cm, noted to be adherent to pelvic side wall and uterus. Adhesiolysis performed with endoshears. Left salpingo-oophrectomy performed with the Ligasure, with incidental intra-operative rupture with spillage of dark brown fluid, consistent with an endometrioma. Remainder of case as per general surgery. Gyn portion of a larger case. Laparoscopic entry via Orozco technique. Left ovary enlarged to 4cm, noted to be adherent to pelvic side wall and uterus. Adhesiolysis performed with endoshears. Left salpingo-oophorectomy performed with the Ligasure, with incidental intra-operative rupture with spillage of dark brown fluid, consistent with an endometrioma. Remainder of case as per general surgery.

## 2023-10-18 NOTE — BRIEF OPERATIVE NOTE - NSICDXBRIEFPOSTOP_GEN_ALL_CORE_FT
POST-OP DIAGNOSIS:  Left ovarian cyst 18-Oct-2023 17:28:33  Juan White  Metastatic colon cancer to liver 18-Oct-2023 17:50:02  Jeromy Carter  
POST-OP DIAGNOSIS:  Left ovarian cyst 18-Oct-2023 17:28:33  Juan White

## 2023-10-18 NOTE — BRIEF OPERATIVE NOTE - NSICDXBRIEFPROCEDURE_GEN_ALL_CORE_FT
PROCEDURES:  Laparoscopic salpingo-oophorectomy, left 18-Oct-2023 17:28:23  Juan White  Diagnostic laparoscopy 18-Oct-2023 17:48:23  Jeromy Carter  
PROCEDURES:  Laparoscopic salpingo-oophorectomy, left 18-Oct-2023 17:28:23  Juan White

## 2023-10-18 NOTE — BRIEF OPERATIVE NOTE - OPERATION/FINDINGS
Orozco cutdown, additional ports placed under direct vision. Lysis of adhesions. Per gyn note, left salpingo-oophorectomy for likely endometrioma. Evaluation of peritoneal surface. Omental and peritoneal lesion biopsies. Hemostasis confirmed. Fascia closed. Skin closed. Dermabond.

## 2023-10-19 ENCOUNTER — TRANSCRIPTION ENCOUNTER (OUTPATIENT)
Age: 58
End: 2023-10-19

## 2023-10-19 VITALS
SYSTOLIC BLOOD PRESSURE: 92 MMHG | HEART RATE: 96 BPM | DIASTOLIC BLOOD PRESSURE: 62 MMHG | RESPIRATION RATE: 18 BRPM | TEMPERATURE: 97 F | OXYGEN SATURATION: 94 %

## 2023-10-19 PROCEDURE — 86850 RBC ANTIBODY SCREEN: CPT

## 2023-10-19 PROCEDURE — 86900 BLOOD TYPING SEROLOGIC ABO: CPT

## 2023-10-19 PROCEDURE — 88307 TISSUE EXAM BY PATHOLOGIST: CPT

## 2023-10-19 PROCEDURE — 88341 IMHCHEM/IMCYTCHM EA ADD ANTB: CPT

## 2023-10-19 PROCEDURE — 49321 LAPAROSCOPY BIOPSY: CPT

## 2023-10-19 PROCEDURE — 88360 TUMOR IMMUNOHISTOCHEM/MANUAL: CPT

## 2023-10-19 PROCEDURE — 88305 TISSUE EXAM BY PATHOLOGIST: CPT

## 2023-10-19 PROCEDURE — 86901 BLOOD TYPING SEROLOGIC RH(D): CPT

## 2023-10-19 PROCEDURE — 58661 LAPAROSCOPY REMOVE ADNEXA: CPT | Mod: LT

## 2023-10-19 RX ORDER — ACETAMINOPHEN WITH CODEINE 300MG-30MG
2 TABLET ORAL
Qty: 32 | Refills: 0
Start: 2023-10-19 | End: 2023-10-22

## 2023-10-19 RX ORDER — HEPARIN SODIUM 5000 [USP'U]/ML
5000 INJECTION INTRAVENOUS; SUBCUTANEOUS EVERY 8 HOURS
Refills: 0 | Status: DISCONTINUED | OUTPATIENT
Start: 2023-10-19 | End: 2023-10-19

## 2023-10-19 RX ORDER — ACETAMINOPHEN 500 MG
650 TABLET ORAL ONCE
Refills: 0 | Status: COMPLETED | OUTPATIENT
Start: 2023-10-19 | End: 2023-10-19

## 2023-10-19 RX ADMIN — Medication 1 TABLET(S): at 02:09

## 2023-10-19 RX ADMIN — Medication 650 MILLIGRAM(S): at 09:00

## 2023-10-19 RX ADMIN — Medication 650 MILLIGRAM(S): at 08:30

## 2023-10-19 RX ADMIN — Medication 1 TABLET(S): at 03:09

## 2023-10-19 NOTE — DISCHARGE NOTE NURSING/CASE MANAGEMENT/SOCIAL WORK - PATIENT PORTAL LINK FT
You can access the FollowMyHealth Patient Portal offered by [facility] by registering at the following website: [email or web address]. By joining [employer]’s FollowMyHealth portal, you will also be able to view your health information using other applications (apps) compatible with our system.

## 2023-10-19 NOTE — DISCHARGE NOTE NURSING/CASE MANAGEMENT/SOCIAL WORK - NSDCPEFALRISK_GEN_ALL_CORE
For information on Fall & Injury Prevention, visit: [email or web address] OR  [email or web address] OR  https://www.cdc.gov/steadi/patient.html

## 2023-10-19 NOTE — PROGRESS NOTE ADULT - ASSESSMENT
59yo POD1 s/p dx l/s with LSO performed by GYN    -VSS, meeting postoperative milestones  -Plan per primary team   -GYN will continue to follow 
58 year old female s/p dx lap, LT salpingo oophorectomy, RENATA, omental and perionteal lesion biopsises.      Diet: Regular  IVF: LR @75 cc/hr  Pain/nausea control  OOB/AMB/SCD'S/IS  
58F PMHx sigmoid colon CA (s/p resection) s/p dx lap, LT salpingo oophorectomy, RENATA, omental and perionteal lesion biopsises (10/18)    Regular diet  Pain/nausea control PRN  D/c home 10/19 AM  No labs

## 2023-10-19 NOTE — PROGRESS NOTE ADULT - SUBJECTIVE AND OBJECTIVE BOX
POST-OPERATIVE NOTE    Procedure: dx lap, LT salpingo oophorectomy, RENATA, omental and perionteal lesion biopsises.        Diagnosis/Indication: perionteal lesion    Surgeon: Dr. Jimenez    S: Pt has no complaints. Denies CP, SOB, TOMAS, calf tenderness. Pain controlled with medication.    O:  T(C): 36.8 (10-18-23 @ 23:00), Max: 36.8 (10-18-23 @ 23:00)  T(F): 98.2 (10-18-23 @ 23:00), Max: 98.2 (10-18-23 @ 23:00)  HR: 102 (10-18-23 @ 23:00) (97 - 113)  BP: 108/71 (10-18-23 @ 23:00) (107/65 - 117/72)  RR: 18 (10-18-23 @ 23:00) (10 - 25)  SpO2: 95% (10-18-23 @ 23:00) (95% - 97%)  Wt(kg): --            Gen: NAD, resting comfortably in bed  C/V: NSR  Pulm: Nonlabored breathing, no respiratory distress  Abd: soft, non distended , TTP around incision site, incision clean dry and intact   Extrem: WWP, no calf edema, SCDs in place      
Pt seen and examined at bedside. Pt complains of mild abdominal pain.   Pt denies any fever, chills, chest pain, SOB, nausea or vomiting     T(F): 97.5 (10-18-23 @ 22:00), Max: 97.5 (10-18-23 @ 22:00)  HR: 98 (10-18-23 @ 22:00) (76 - 113)  BP: 117/72 (10-18-23 @ 22:00) (96/57 - 121/75)  RR: 20 (10-18-23 @ 22:00) (10 - 25)  SpO2: 96% (10-18-23 @ 22:00) (95% - 100%)    10-18 @ 07:01  -  10-18 @ 22:50  --------------------------------------------------------  IN: 300 mL / OUT: 300 mL / NET: 0 mL        HYDROmorphone  Injectable 0.5 milliGRAM(s) IV Push every 30 minutes PRN Breakthrough pain  lactated ringers. 1000 milliLiter(s) IV Continuous <Continuous>      Physical exam:  Constitutional: NAD  Abdomen: incision site clean, dry and intact. Soft, mildly tender, nondistended  Extremities: no lower extremity edema, or calve tenderness. SCDs in place    A:   58y, s/p diagnostic laparoscopy and LSO. Meeting postop milestones appropriately.     Plan:  1. Vital signs stable, continue to monitor per protocol  2. Pain control Tylenol/Toradol, Oxycodone PRN for BTP.  3. DVT prophylaxis: SCDs  4. CV: IVH   5. Pulm: Incentive spirometer (at least 10 times per hour while awake)   6. GI: low fiber diet   7. : TOV pending  8. Follow up labs: AM CBC  9. Activity: bedrest tonight 
INTERVAL HPI/OVERNIGHT EVENTS:  s/p dx lap, LT salpingo oophorectomy, RENATA, omental and perionteal lesion biopsises. POC wnl , passed TOV,Patient does not have escort home, 23 hour observation    STATUS POST: dx lap, LT salpingo oophorectomy, RENATA, omental and perionteal lesion biopsises      SUBJECTIVE: Patient seen and examined bedside this morning with chief resident. She has no acute complaints and is eager to get home. She has been ambulating and tolerating her diet. Denies cp, sob, nausea, emesis, or fevers.      MEDICATIONS  (STANDING):  heparin   Injectable 5000 Unit(s) SubCutaneous every 8 hours  lactated ringers. 1000 milliLiter(s) (75 mL/Hr) IV Continuous <Continuous>    MEDICATIONS  (PRN):  acetaminophen  300 mG/codeine 30 mG 1 Tablet(s) Oral every 6 hours PRN Severe Pain (7 - 10)  HYDROmorphone  Injectable 0.5 milliGRAM(s) IV Push every 30 minutes PRN Breakthrough pain      Vital Signs Last 24 Hrs  T(C): 36.3 (19 Oct 2023 05:39), Max: 36.8 (18 Oct 2023 23:00)  T(F): 97.4 (19 Oct 2023 05:39), Max: 98.3 (19 Oct 2023 00:26)  HR: 96 (19 Oct 2023 05:39) (76 - 113)  BP: 92/62 (19 Oct 2023 05:39) (92/62 - 128/75)  BP(mean): 72 (19 Oct 2023 05:39) (70 - 94)  RR: 18 (19 Oct 2023 05:39) (10 - 25)  SpO2: 94% (19 Oct 2023 05:39) (94% - 100%)    Parameters below as of 19 Oct 2023 05:39  Patient On (Oxygen Delivery Method): room air    PHYSICAL EXAM:  Constitutional: A&Ox3, resting comfortably in bed  Respiratory: non labored breathing, no respiratory distress  Cardiovascular: NSR, RRR  Gastrointestinal: Abdomen soft, NTND                 Incision: c/d/i  Genitourinary: voiding  Extremities: wwp, no calf tenderness or edema, +SCDs        I&O's Detail    18 Oct 2023 07:01  -  19 Oct 2023 07:00  --------------------------------------------------------  IN:    Lactated Ringers: 600 mL  Total IN: 600 mL    OUT:    Voided (mL): 750 mL  Total OUT: 750 mL    Total NET: -150 mL          LABS:                RADIOLOGY & ADDITIONAL STUDIES:
Pt seen and examined at bedside. Pt states mild abdominal pain. Pt [x] ambulating, tolerating reg diet, [x] flatus,  [x] urinating adequately.   Pt denies fever, chills, chest pain, SOB, nausea, vomiting, lightheadedness, dizziness.      T(F): 97.4 (10-19-23 @ 05:39), Max: 98.3 (10-19-23 @ 00:26)  HR: 96 (10-19-23 @ 05:39) (76 - 113)  BP: 92/62 (10-19-23 @ 05:39) (92/62 - 128/75)  RR: 18 (10-19-23 @ 05:39) (10 - 25)  SpO2: 94% (10-19-23 @ 05:39) (94% - 100%)  Wt(kg): --  I&O's Summary    18 Oct 2023 07:01  -  19 Oct 2023 07:00  --------------------------------------------------------  IN: 600 mL / OUT: 750 mL / NET: -150 mL        MEDICATIONS  (STANDING):  heparin   Injectable 5000 Unit(s) SubCutaneous every 8 hours  lactated ringers. 1000 milliLiter(s) (75 mL/Hr) IV Continuous <Continuous>    MEDICATIONS  (PRN):  acetaminophen  300 mG/codeine 30 mG 1 Tablet(s) Oral every 6 hours PRN Severe Pain (7 - 10)  HYDROmorphone  Injectable 0.5 milliGRAM(s) IV Push every 30 minutes PRN Breakthrough pain      Physical Exam:  Constitutional: NAD  Pulmonary: no increased work of breathing  Cardiovascular: Regular rate and rhythm   Abdomen: incision sites clean, dry, intact. Soft, mildly tender, [mildly] distended, no guarding, no rebound, [+] bowel sounds  Extremities: no lower extremity edema or calf tenderness. SCDs in place     LABS:                RADIOLOGY & ADDITIONAL TESTS:

## 2023-10-30 LAB
SURGICAL PATHOLOGY STUDY: SIGNIFICANT CHANGE UP
SURGICAL PATHOLOGY STUDY: SIGNIFICANT CHANGE UP

## 2023-10-31 ENCOUNTER — APPOINTMENT (OUTPATIENT)
Dept: SURGICAL ONCOLOGY | Facility: CLINIC | Age: 58
End: 2023-10-31
Payer: MEDICAID

## 2023-10-31 ENCOUNTER — OUTPATIENT (OUTPATIENT)
Dept: OUTPATIENT SERVICES | Facility: HOSPITAL | Age: 58
LOS: 1 days | End: 2023-10-31
Payer: COMMERCIAL

## 2023-10-31 VITALS
OXYGEN SATURATION: 99 % | DIASTOLIC BLOOD PRESSURE: 78 MMHG | HEART RATE: 99 BPM | WEIGHT: 180 LBS | HEIGHT: 67 IN | SYSTOLIC BLOOD PRESSURE: 122 MMHG | BODY MASS INDEX: 28.25 KG/M2 | RESPIRATION RATE: 16 BRPM | TEMPERATURE: 97.7 F

## 2023-10-31 DIAGNOSIS — Z01.818 ENCOUNTER FOR OTHER PREPROCEDURAL EXAMINATION: ICD-10-CM

## 2023-10-31 DIAGNOSIS — Z98.890 OTHER SPECIFIED POSTPROCEDURAL STATES: Chronic | ICD-10-CM

## 2023-10-31 PROCEDURE — 93005 ELECTROCARDIOGRAM TRACING: CPT

## 2023-10-31 PROCEDURE — 99215 OFFICE O/P EST HI 40 MIN: CPT

## 2023-10-31 PROCEDURE — 93010 ELECTROCARDIOGRAM REPORT: CPT | Mod: NC

## 2023-11-01 ENCOUNTER — APPOINTMENT (OUTPATIENT)
Dept: GYNECOLOGIC ONCOLOGY | Facility: CLINIC | Age: 58
End: 2023-11-01
Payer: MEDICAID

## 2023-11-01 ENCOUNTER — NON-APPOINTMENT (OUTPATIENT)
Age: 58
End: 2023-11-01

## 2023-11-01 VITALS
OXYGEN SATURATION: 96 % | TEMPERATURE: 97.6 F | HEART RATE: 113 BPM | SYSTOLIC BLOOD PRESSURE: 116 MMHG | DIASTOLIC BLOOD PRESSURE: 78 MMHG | WEIGHT: 182 LBS | BODY MASS INDEX: 28.56 KG/M2 | HEIGHT: 67 IN

## 2023-11-01 LAB
ABO + RH PNL BLD: NORMAL
ALBUMIN SERPL ELPH-MCNC: 4.6 G/DL
ALP BLD-CCNC: 156 U/L
ALT SERPL-CCNC: 40 U/L
ANION GAP SERPL CALC-SCNC: 14 MMOL/L
APTT BLD: 31.5 SEC
AST SERPL-CCNC: 28 U/L
BASOPHILS # BLD AUTO: 0.02 K/UL
BASOPHILS NFR BLD AUTO: 0.3 %
BILIRUB DIRECT SERPL-MCNC: 0.1 MG/DL
BILIRUB INDIRECT SERPL-MCNC: 0.2 MG/DL
BILIRUB SERPL-MCNC: 0.3 MG/DL
BUN SERPL-MCNC: 19 MG/DL
CALCIUM SERPL-MCNC: 9.9 MG/DL
CHLORIDE SERPL-SCNC: 107 MMOL/L
CO2 SERPL-SCNC: 21 MMOL/L
CREAT SERPL-MCNC: 0.73 MG/DL
EGFR: 96 ML/MIN/1.73M2
EOSINOPHIL # BLD AUTO: 0.18 K/UL
EOSINOPHIL NFR BLD AUTO: 2.3 %
GLUCOSE SERPL-MCNC: 138 MG/DL
HCT VFR BLD CALC: 43.4 %
HGB BLD-MCNC: 14.1 G/DL
IMM GRANULOCYTES NFR BLD AUTO: 0.1 %
INR PPP: 1 RATIO
LYMPHOCYTES # BLD AUTO: 2.56 K/UL
LYMPHOCYTES NFR BLD AUTO: 32.7 %
MAN DIFF?: NORMAL
MCHC RBC-ENTMCNC: 28.5 PG
MCHC RBC-ENTMCNC: 32.5 GM/DL
MCV RBC AUTO: 87.7 FL
MONOCYTES # BLD AUTO: 0.63 K/UL
MONOCYTES NFR BLD AUTO: 8.1 %
NEUTROPHILS # BLD AUTO: 4.42 K/UL
NEUTROPHILS NFR BLD AUTO: 56.5 %
PLATELET # BLD AUTO: 252 K/UL
POTASSIUM SERPL-SCNC: 4 MMOL/L
PROT SERPL-MCNC: 7.4 G/DL
PT BLD: 11.4 SEC
RBC # BLD: 4.95 M/UL
RBC # FLD: 15.9 %
SODIUM SERPL-SCNC: 142 MMOL/L
WBC # FLD AUTO: 7.82 K/UL

## 2023-11-01 PROCEDURE — 99024 POSTOP FOLLOW-UP VISIT: CPT

## 2023-11-02 LAB
CANCER AG125 SERPL-ACNC: 20 U/ML
CEA SERPL-MCNC: 2.7 NG/ML

## 2023-11-08 ENCOUNTER — TRANSCRIPTION ENCOUNTER (OUTPATIENT)
Age: 58
End: 2023-11-08

## 2023-11-08 VITALS
WEIGHT: 182.32 LBS | SYSTOLIC BLOOD PRESSURE: 113 MMHG | DIASTOLIC BLOOD PRESSURE: 78 MMHG | OXYGEN SATURATION: 96 % | HEART RATE: 90 BPM | HEIGHT: 67 IN | TEMPERATURE: 97 F | RESPIRATION RATE: 16 BRPM

## 2023-11-08 RX ORDER — INFLUENZA VIRUS VACCINE 15; 15; 15; 15 UG/.5ML; UG/.5ML; UG/.5ML; UG/.5ML
0.5 SUSPENSION INTRAMUSCULAR ONCE
Refills: 0 | Status: DISCONTINUED | OUTPATIENT
Start: 2023-11-09 | End: 2023-11-13

## 2023-11-08 NOTE — PATIENT PROFILE ADULT - FALL HARM RISK - UNIVERSAL INTERVENTIONS
Bed in lowest position, wheels locked, appropriate side rails in place/Call bell, personal items and telephone in reach/Instruct patient to call for assistance before getting out of bed or chair/Non-slip footwear when patient is out of bed/[place] to call system/Physically safe environment - no spills, clutter or unnecessary equipment/Purposeful Proactive Rounding/Room/bathroom lighting operational, light cord in reach

## 2023-11-08 NOTE — PATIENT PROFILE ADULT - STATED REASON FOR ADMISSION
robotic partial liver resection; total hysterectomy right salpingo oophorectomy sentinel lymph node biopsies

## 2023-11-09 ENCOUNTER — RESULT REVIEW (OUTPATIENT)
Age: 58
End: 2023-11-09

## 2023-11-09 ENCOUNTER — TRANSCRIPTION ENCOUNTER (OUTPATIENT)
Age: 58
End: 2023-11-09

## 2023-11-09 ENCOUNTER — APPOINTMENT (OUTPATIENT)
Dept: GYNECOLOGIC ONCOLOGY | Facility: HOSPITAL | Age: 58
End: 2023-11-09

## 2023-11-09 ENCOUNTER — APPOINTMENT (OUTPATIENT)
Dept: SURGICAL ONCOLOGY | Facility: HOSPITAL | Age: 58
End: 2023-11-09

## 2023-11-09 ENCOUNTER — INPATIENT (INPATIENT)
Facility: HOSPITAL | Age: 58
LOS: 3 days | Discharge: HOME | DRG: 737 | End: 2023-11-13
Attending: SURGERY | Admitting: SURGERY
Payer: COMMERCIAL

## 2023-11-09 DIAGNOSIS — Z90.79 ACQUIRED ABSENCE OF OTHER GENITAL ORGAN(S): Chronic | ICD-10-CM

## 2023-11-09 DIAGNOSIS — Z90.89 ACQUIRED ABSENCE OF OTHER ORGANS: Chronic | ICD-10-CM

## 2023-11-09 DIAGNOSIS — Z98.890 OTHER SPECIFIED POSTPROCEDURAL STATES: Chronic | ICD-10-CM

## 2023-11-09 DIAGNOSIS — C56.2 MALIGNANT NEOPLASM OF LEFT OVARY: ICD-10-CM

## 2023-11-09 DIAGNOSIS — Z90.49 ACQUIRED ABSENCE OF OTHER SPECIFIED PARTS OF DIGESTIVE TRACT: Chronic | ICD-10-CM

## 2023-11-09 LAB
ALBUMIN SERPL ELPH-MCNC: 3.6 G/DL — SIGNIFICANT CHANGE UP (ref 3.3–5)
ALBUMIN SERPL ELPH-MCNC: 3.6 G/DL — SIGNIFICANT CHANGE UP (ref 3.3–5)
ALP SERPL-CCNC: 72 U/L — SIGNIFICANT CHANGE UP (ref 40–120)
ALP SERPL-CCNC: 72 U/L — SIGNIFICANT CHANGE UP (ref 40–120)
ALT FLD-CCNC: 268 U/L — HIGH (ref 10–45)
ALT FLD-CCNC: 268 U/L — HIGH (ref 10–45)
ANION GAP SERPL CALC-SCNC: 12 MMOL/L — SIGNIFICANT CHANGE UP (ref 5–17)
ANION GAP SERPL CALC-SCNC: 12 MMOL/L — SIGNIFICANT CHANGE UP (ref 5–17)
AST SERPL-CCNC: 309 U/L — HIGH (ref 10–40)
AST SERPL-CCNC: 309 U/L — HIGH (ref 10–40)
BASE EXCESS BLDA CALC-SCNC: -4.1 MMOL/L — LOW (ref -2–3)
BASE EXCESS BLDA CALC-SCNC: -4.1 MMOL/L — LOW (ref -2–3)
BASE EXCESS BLDA CALC-SCNC: -4.2 MMOL/L — LOW (ref -2–3)
BASE EXCESS BLDA CALC-SCNC: -4.2 MMOL/L — LOW (ref -2–3)
BILIRUB SERPL-MCNC: 1 MG/DL — SIGNIFICANT CHANGE UP (ref 0.2–1.2)
BILIRUB SERPL-MCNC: 1 MG/DL — SIGNIFICANT CHANGE UP (ref 0.2–1.2)
BLD GP AB SCN SERPL QL: NEGATIVE — SIGNIFICANT CHANGE UP
BLD GP AB SCN SERPL QL: NEGATIVE — SIGNIFICANT CHANGE UP
BUN SERPL-MCNC: 13 MG/DL — SIGNIFICANT CHANGE UP (ref 7–23)
BUN SERPL-MCNC: 13 MG/DL — SIGNIFICANT CHANGE UP (ref 7–23)
CA-I BLDA-SCNC: 1.05 MMOL/L — LOW (ref 1.15–1.33)
CA-I BLDA-SCNC: 1.05 MMOL/L — LOW (ref 1.15–1.33)
CA-I BLDA-SCNC: 1.1 MMOL/L — LOW (ref 1.15–1.33)
CA-I BLDA-SCNC: 1.1 MMOL/L — LOW (ref 1.15–1.33)
CALCIUM SERPL-MCNC: 8.1 MG/DL — LOW (ref 8.4–10.5)
CALCIUM SERPL-MCNC: 8.1 MG/DL — LOW (ref 8.4–10.5)
CHLORIDE SERPL-SCNC: 109 MMOL/L — HIGH (ref 96–108)
CHLORIDE SERPL-SCNC: 109 MMOL/L — HIGH (ref 96–108)
CO2 BLDA-SCNC: 22 MMOL/L — SIGNIFICANT CHANGE UP (ref 19–24)
CO2 SERPL-SCNC: 20 MMOL/L — LOW (ref 22–31)
CO2 SERPL-SCNC: 20 MMOL/L — LOW (ref 22–31)
COHGB MFR BLDA: 0.1 % — SIGNIFICANT CHANGE UP
COHGB MFR BLDA: 0.1 % — SIGNIFICANT CHANGE UP
COHGB MFR BLDA: 1.2 % — SIGNIFICANT CHANGE UP
COHGB MFR BLDA: 1.2 % — SIGNIFICANT CHANGE UP
CREAT SERPL-MCNC: 0.62 MG/DL — SIGNIFICANT CHANGE UP (ref 0.5–1.3)
CREAT SERPL-MCNC: 0.62 MG/DL — SIGNIFICANT CHANGE UP (ref 0.5–1.3)
EGFR: 104 ML/MIN/1.73M2 — SIGNIFICANT CHANGE UP
EGFR: 104 ML/MIN/1.73M2 — SIGNIFICANT CHANGE UP
GLUCOSE BLDA-MCNC: 165 MG/DL — HIGH (ref 70–99)
GLUCOSE BLDA-MCNC: 165 MG/DL — HIGH (ref 70–99)
GLUCOSE BLDA-MCNC: 171 MG/DL — HIGH (ref 70–99)
GLUCOSE BLDA-MCNC: 171 MG/DL — HIGH (ref 70–99)
GLUCOSE SERPL-MCNC: 197 MG/DL — HIGH (ref 70–99)
GLUCOSE SERPL-MCNC: 197 MG/DL — HIGH (ref 70–99)
HCO3 BLDA-SCNC: 21 MMOL/L — SIGNIFICANT CHANGE UP (ref 21–28)
HCT VFR BLD CALC: 28.8 % — LOW (ref 34.5–45)
HCT VFR BLD CALC: 28.8 % — LOW (ref 34.5–45)
HGB BLD-MCNC: 9.5 G/DL — LOW (ref 11.5–15.5)
HGB BLD-MCNC: 9.5 G/DL — LOW (ref 11.5–15.5)
HGB BLDA-MCNC: 8.3 G/DL — LOW (ref 11.7–16.1)
HGB BLDA-MCNC: 8.3 G/DL — LOW (ref 11.7–16.1)
HGB BLDA-MCNC: 9.7 G/DL — LOW (ref 11.7–16.1)
HGB BLDA-MCNC: 9.7 G/DL — LOW (ref 11.7–16.1)
MAGNESIUM SERPL-MCNC: 1.5 MG/DL — LOW (ref 1.6–2.6)
MAGNESIUM SERPL-MCNC: 1.5 MG/DL — LOW (ref 1.6–2.6)
MCHC RBC-ENTMCNC: 27.7 PG — SIGNIFICANT CHANGE UP (ref 27–34)
MCHC RBC-ENTMCNC: 27.7 PG — SIGNIFICANT CHANGE UP (ref 27–34)
MCHC RBC-ENTMCNC: 33 GM/DL — SIGNIFICANT CHANGE UP (ref 32–36)
MCHC RBC-ENTMCNC: 33 GM/DL — SIGNIFICANT CHANGE UP (ref 32–36)
MCV RBC AUTO: 84 FL — SIGNIFICANT CHANGE UP (ref 80–100)
MCV RBC AUTO: 84 FL — SIGNIFICANT CHANGE UP (ref 80–100)
METHGB MFR BLDA: 0 % — SIGNIFICANT CHANGE UP
METHGB MFR BLDA: 0 % — SIGNIFICANT CHANGE UP
METHGB MFR BLDA: 0.3 % — SIGNIFICANT CHANGE UP
METHGB MFR BLDA: 0.3 % — SIGNIFICANT CHANGE UP
NRBC # BLD: 0 /100 WBCS — SIGNIFICANT CHANGE UP (ref 0–0)
NRBC # BLD: 0 /100 WBCS — SIGNIFICANT CHANGE UP (ref 0–0)
OXYHGB MFR BLDA: 97.4 % — HIGH (ref 90–95)
OXYHGB MFR BLDA: 97.4 % — HIGH (ref 90–95)
OXYHGB MFR BLDA: 97.8 % — HIGH (ref 90–95)
OXYHGB MFR BLDA: 97.8 % — HIGH (ref 90–95)
PCO2 BLDA: 37 MMHG — SIGNIFICANT CHANGE UP (ref 32–45)
PCO2 BLDA: 37 MMHG — SIGNIFICANT CHANGE UP (ref 32–45)
PCO2 BLDA: 38 MMHG — SIGNIFICANT CHANGE UP (ref 32–45)
PCO2 BLDA: 38 MMHG — SIGNIFICANT CHANGE UP (ref 32–45)
PH BLDA: 7.35 — SIGNIFICANT CHANGE UP (ref 7.35–7.45)
PH BLDA: 7.35 — SIGNIFICANT CHANGE UP (ref 7.35–7.45)
PH BLDA: 7.36 — SIGNIFICANT CHANGE UP (ref 7.35–7.45)
PH BLDA: 7.36 — SIGNIFICANT CHANGE UP (ref 7.35–7.45)
PHOSPHATE SERPL-MCNC: 3 MG/DL — SIGNIFICANT CHANGE UP (ref 2.5–4.5)
PHOSPHATE SERPL-MCNC: 3 MG/DL — SIGNIFICANT CHANGE UP (ref 2.5–4.5)
PLATELET # BLD AUTO: 141 K/UL — LOW (ref 150–400)
PLATELET # BLD AUTO: 141 K/UL — LOW (ref 150–400)
PO2 BLDA: 314 MMHG — HIGH (ref 83–108)
PO2 BLDA: 314 MMHG — HIGH (ref 83–108)
PO2 BLDA: 337 MMHG — HIGH (ref 83–108)
PO2 BLDA: 337 MMHG — HIGH (ref 83–108)
POTASSIUM BLDA-SCNC: 4.3 MMOL/L — SIGNIFICANT CHANGE UP (ref 3.5–5.1)
POTASSIUM BLDA-SCNC: 4.3 MMOL/L — SIGNIFICANT CHANGE UP (ref 3.5–5.1)
POTASSIUM BLDA-SCNC: 4.5 MMOL/L — SIGNIFICANT CHANGE UP (ref 3.5–5.1)
POTASSIUM BLDA-SCNC: 4.5 MMOL/L — SIGNIFICANT CHANGE UP (ref 3.5–5.1)
POTASSIUM SERPL-MCNC: 3.8 MMOL/L — SIGNIFICANT CHANGE UP (ref 3.5–5.3)
POTASSIUM SERPL-MCNC: 3.8 MMOL/L — SIGNIFICANT CHANGE UP (ref 3.5–5.3)
POTASSIUM SERPL-SCNC: 3.8 MMOL/L — SIGNIFICANT CHANGE UP (ref 3.5–5.3)
POTASSIUM SERPL-SCNC: 3.8 MMOL/L — SIGNIFICANT CHANGE UP (ref 3.5–5.3)
PROT SERPL-MCNC: 5.3 G/DL — LOW (ref 6–8.3)
PROT SERPL-MCNC: 5.3 G/DL — LOW (ref 6–8.3)
RBC # BLD: 3.43 M/UL — LOW (ref 3.8–5.2)
RBC # BLD: 3.43 M/UL — LOW (ref 3.8–5.2)
RBC # FLD: 20.1 % — HIGH (ref 10.3–14.5)
RBC # FLD: 20.1 % — HIGH (ref 10.3–14.5)
RH IG SCN BLD-IMP: POSITIVE — SIGNIFICANT CHANGE UP
RH IG SCN BLD-IMP: POSITIVE — SIGNIFICANT CHANGE UP
SAO2 % BLDA: 97.9 % — SIGNIFICANT CHANGE UP (ref 94–98)
SAO2 % BLDA: 97.9 % — SIGNIFICANT CHANGE UP (ref 94–98)
SAO2 % BLDA: 98.9 % — HIGH (ref 94–98)
SAO2 % BLDA: 98.9 % — HIGH (ref 94–98)
SODIUM BLDA-SCNC: 138 MMOL/L — SIGNIFICANT CHANGE UP (ref 136–145)
SODIUM BLDA-SCNC: 138 MMOL/L — SIGNIFICANT CHANGE UP (ref 136–145)
SODIUM BLDA-SCNC: 139 MMOL/L — SIGNIFICANT CHANGE UP (ref 136–145)
SODIUM BLDA-SCNC: 139 MMOL/L — SIGNIFICANT CHANGE UP (ref 136–145)
SODIUM SERPL-SCNC: 141 MMOL/L — SIGNIFICANT CHANGE UP (ref 135–145)
SODIUM SERPL-SCNC: 141 MMOL/L — SIGNIFICANT CHANGE UP (ref 135–145)
WBC # BLD: 9.41 K/UL — SIGNIFICANT CHANGE UP (ref 3.8–10.5)
WBC # BLD: 9.41 K/UL — SIGNIFICANT CHANGE UP (ref 3.8–10.5)
WBC # FLD AUTO: 9.41 K/UL — SIGNIFICANT CHANGE UP (ref 3.8–10.5)
WBC # FLD AUTO: 9.41 K/UL — SIGNIFICANT CHANGE UP (ref 3.8–10.5)

## 2023-11-09 PROCEDURE — 88304 TISSUE EXAM BY PATHOLOGIST: CPT | Mod: 26

## 2023-11-09 PROCEDURE — 88305 TISSUE EXAM BY PATHOLOGIST: CPT | Mod: 26

## 2023-11-09 PROCEDURE — 88309 TISSUE EXAM BY PATHOLOGIST: CPT | Mod: 26

## 2023-11-09 PROCEDURE — 47120 PARTIAL REMOVAL OF LIVER: CPT | Mod: 22

## 2023-11-09 PROCEDURE — 88341 IMHCHEM/IMCYTCHM EA ADD ANTB: CPT | Mod: 26

## 2023-11-09 PROCEDURE — 58548 LAP RADICAL HYST: CPT

## 2023-11-09 PROCEDURE — 88307 TISSUE EXAM BY PATHOLOGIST: CPT | Mod: 26

## 2023-11-09 PROCEDURE — 76998 US GUIDE INTRAOP: CPT | Mod: 26,80

## 2023-11-09 PROCEDURE — 88342 IMHCHEM/IMCYTCHM 1ST ANTB: CPT | Mod: 26

## 2023-11-09 PROCEDURE — 76998 US GUIDE INTRAOP: CPT | Mod: 26

## 2023-11-09 PROCEDURE — 58548 LAP RADICAL HYST: CPT | Mod: 82

## 2023-11-09 PROCEDURE — 47120 PARTIAL REMOVAL OF LIVER: CPT | Mod: 80,22

## 2023-11-09 DEVICE — PATCH EVARREST FIBRIN SEALANT 2X4CM: Type: IMPLANTABLE DEVICE | Status: FUNCTIONAL

## 2023-11-09 DEVICE — LIGATING CLIPS WECK HEMOLOK POLYMER LARGE (PURPLE) 6: Type: IMPLANTABLE DEVICE | Status: FUNCTIONAL

## 2023-11-09 DEVICE — LIGATING CLIPS WECK HORIZON LARGE (ORANGE) 24: Type: IMPLANTABLE DEVICE | Status: FUNCTIONAL

## 2023-11-09 DEVICE — LIGATING CLIPS WECK HORIZON MEDIUM (BLUE) 24: Type: IMPLANTABLE DEVICE | Status: FUNCTIONAL

## 2023-11-09 DEVICE — STAPLER COVIDIEN TRI-STAPLE 60MM TAN RELOAD: Type: IMPLANTABLE DEVICE | Status: FUNCTIONAL

## 2023-11-09 DEVICE — AGENT HEMOSTATIC HEMOBLAST 1.65G 10CM: Type: IMPLANTABLE DEVICE | Status: FUNCTIONAL

## 2023-11-09 RX ORDER — ACETAMINOPHEN 500 MG
1000 TABLET ORAL ONCE
Refills: 0 | Status: COMPLETED | OUTPATIENT
Start: 2023-11-09 | End: 2023-11-09

## 2023-11-09 RX ORDER — KETOROLAC TROMETHAMINE 30 MG/ML
15 SYRINGE (ML) INJECTION EVERY 8 HOURS
Refills: 0 | Status: DISCONTINUED | OUTPATIENT
Start: 2023-11-09 | End: 2023-11-10

## 2023-11-09 RX ORDER — SODIUM CHLORIDE 9 MG/ML
500 INJECTION, SOLUTION INTRAVENOUS ONCE
Refills: 0 | Status: COMPLETED | OUTPATIENT
Start: 2023-11-09 | End: 2023-11-09

## 2023-11-09 RX ORDER — PANTOPRAZOLE SODIUM 20 MG/1
40 TABLET, DELAYED RELEASE ORAL
Refills: 0 | Status: DISCONTINUED | OUTPATIENT
Start: 2023-11-09 | End: 2023-11-13

## 2023-11-09 RX ORDER — OMEPRAZOLE 10 MG/1
1 CAPSULE, DELAYED RELEASE ORAL
Refills: 0 | DISCHARGE

## 2023-11-09 RX ORDER — SODIUM CHLORIDE 9 MG/ML
1000 INJECTION, SOLUTION INTRAVENOUS
Refills: 0 | Status: DISCONTINUED | OUTPATIENT
Start: 2023-11-09 | End: 2023-11-10

## 2023-11-09 RX ORDER — CEFAZOLIN SODIUM 1 G
2000 VIAL (EA) INJECTION EVERY 8 HOURS
Refills: 0 | Status: COMPLETED | OUTPATIENT
Start: 2023-11-09 | End: 2023-11-10

## 2023-11-09 RX ORDER — HEPARIN SODIUM 5000 [USP'U]/ML
5000 INJECTION INTRAVENOUS; SUBCUTANEOUS ONCE
Refills: 0 | Status: COMPLETED | OUTPATIENT
Start: 2023-11-09 | End: 2023-11-09

## 2023-11-09 RX ORDER — NALOXONE HYDROCHLORIDE 4 MG/.1ML
0.1 SPRAY NASAL
Refills: 0 | Status: DISCONTINUED | OUTPATIENT
Start: 2023-11-09 | End: 2023-11-12

## 2023-11-09 RX ORDER — POTASSIUM CHLORIDE 20 MEQ
20 PACKET (EA) ORAL ONCE
Refills: 0 | Status: COMPLETED | OUTPATIENT
Start: 2023-11-09 | End: 2023-11-09

## 2023-11-09 RX ORDER — HYDROMORPHONE HYDROCHLORIDE 2 MG/ML
30 INJECTION INTRAMUSCULAR; INTRAVENOUS; SUBCUTANEOUS
Refills: 0 | Status: DISCONTINUED | OUTPATIENT
Start: 2023-11-09 | End: 2023-11-12

## 2023-11-09 RX ORDER — ONDANSETRON 8 MG/1
4 TABLET, FILM COATED ORAL EVERY 6 HOURS
Refills: 0 | Status: DISCONTINUED | OUTPATIENT
Start: 2023-11-09 | End: 2023-11-13

## 2023-11-09 RX ORDER — CELECOXIB 200 MG/1
400 CAPSULE ORAL ONCE
Refills: 0 | Status: COMPLETED | OUTPATIENT
Start: 2023-11-09 | End: 2023-11-09

## 2023-11-09 RX ORDER — HYDROMORPHONE HYDROCHLORIDE 2 MG/ML
0.5 INJECTION INTRAMUSCULAR; INTRAVENOUS; SUBCUTANEOUS ONCE
Refills: 0 | Status: DISCONTINUED | OUTPATIENT
Start: 2023-11-09 | End: 2023-11-09

## 2023-11-09 RX ORDER — MAGNESIUM SULFATE 500 MG/ML
2 VIAL (ML) INJECTION ONCE
Refills: 0 | Status: COMPLETED | OUTPATIENT
Start: 2023-11-09 | End: 2023-11-09

## 2023-11-09 RX ADMIN — SODIUM CHLORIDE 1000 MILLILITER(S): 9 INJECTION, SOLUTION INTRAVENOUS at 20:20

## 2023-11-09 RX ADMIN — SODIUM CHLORIDE 100 MILLILITER(S): 9 INJECTION, SOLUTION INTRAVENOUS at 18:21

## 2023-11-09 RX ADMIN — SODIUM CHLORIDE 100 MILLILITER(S): 9 INJECTION, SOLUTION INTRAVENOUS at 20:20

## 2023-11-09 RX ADMIN — Medication 25 GRAM(S): at 20:20

## 2023-11-09 RX ADMIN — Medication 50 MILLIEQUIVALENT(S): at 22:37

## 2023-11-09 RX ADMIN — HEPARIN SODIUM 5000 UNIT(S): 5000 INJECTION INTRAVENOUS; SUBCUTANEOUS at 07:01

## 2023-11-09 RX ADMIN — HYDROMORPHONE HYDROCHLORIDE 0.5 MILLIGRAM(S): 2 INJECTION INTRAMUSCULAR; INTRAVENOUS; SUBCUTANEOUS at 18:35

## 2023-11-09 RX ADMIN — Medication 1000 MILLIGRAM(S): at 07:02

## 2023-11-09 RX ADMIN — HYDROMORPHONE HYDROCHLORIDE 0.5 MILLIGRAM(S): 2 INJECTION INTRAMUSCULAR; INTRAVENOUS; SUBCUTANEOUS at 18:20

## 2023-11-09 RX ADMIN — Medication 15 MILLIGRAM(S): at 20:15

## 2023-11-09 RX ADMIN — Medication 100 MILLIGRAM(S): at 20:01

## 2023-11-09 RX ADMIN — HYDROMORPHONE HYDROCHLORIDE 30 MILLILITER(S): 2 INJECTION INTRAMUSCULAR; INTRAVENOUS; SUBCUTANEOUS at 18:28

## 2023-11-09 RX ADMIN — Medication 15 MILLIGRAM(S): at 20:30

## 2023-11-09 RX ADMIN — CELECOXIB 400 MILLIGRAM(S): 200 CAPSULE ORAL at 07:02

## 2023-11-09 RX ADMIN — SODIUM CHLORIDE 1000 MILLILITER(S): 9 INJECTION, SOLUTION INTRAVENOUS at 23:15

## 2023-11-09 NOTE — H&P ADULT - ASSESSMENT
Assessment  Ms. Karin Oquendo is a 58F with PMH of both endometrial cancer and colon cancer metastatic to the liver (both cancers separate primary cancers). Patient is presenting today for tumor resection and is now s/p a ANTONIO-RSO performed by OBGYN as well as a partial right liver resection by Dr. Jimenez.    PLAN    #S/P R Liver Resection  - Case uncomplicated, admitted postoperatively for pain control and rehabilitation  - Dilaudid PCA postoperatively  - Diet NPO overnight with LR @ 100cc/hr  - Keep Ryder in overnight  - Monitor BP and hemodynamic status overnight     #S/P ANTONIO-RSO  - Appreciate OBGYN recs

## 2023-11-09 NOTE — BRIEF OPERATIVE NOTE - NSICDXBRIEFPREOP_GEN_ALL_CORE_FT
PRE-OP DIAGNOSIS:  Metastatic colon cancer to liver 09-Nov-2023 17:36:11  Masha Rodas  
PRE-OP DIAGNOSIS:  Ovarian cancer 09-Nov-2023 10:33:35  Sirisha Mark

## 2023-11-09 NOTE — BRIEF OPERATIVE NOTE - NSICDXBRIEFPOSTOP_GEN_ALL_CORE_FT
POST-OP DIAGNOSIS:  Metastatic colon cancer to liver 09-Nov-2023 17:36:26  Masha Rodas  
POST-OP DIAGNOSIS:  Ovarian cancer 09-Nov-2023 10:33:43  Sirisha Mark

## 2023-11-09 NOTE — H&P ADULT - NSICDXPASTMEDICALHX_GEN_ALL_CORE_FT
PAST MEDICAL HISTORY:  Colon cancer chemo 2023    Endometrial carcinoma     Kidney stones     Liver mass

## 2023-11-09 NOTE — BRIEF OPERATIVE NOTE - OPERATION/FINDINGS
6cm uterus with posterior rectal adhesions - lysed. Bilateral ureters identified. LSO previously performed - TLH, RSO performed, specimens removed through vagina. Vaginal cuff closed with v-lock. Sigmoidoscopy performed, unremarkable. Sulcal and periurethral lacerations repaired with vicryl and monocryl, respectively.
General anesthesia. OBGYN present for first half of case, see their note for operative details.   Procedure initially started via a robotic approach. Several hours spent mobilizing the right lobe of the liver, which was the side that was noted to have the cancer preoperatively. After mobilizing the right lobe, we performed an ultrasound of the liver parenchyma and it was noted that the cancer had appeared to grow significantly from what was expected based on the CT scan 5 weeks prior. Given this extensive growth, it was decided to convert to open to be able to better visualize the lesion and perform the dissection of the liver. Kocher incision made and liver exposed. Cholecystectomy performed. Large segment of right lobe of liver removed en bloc, followed by another large specimen medial to that to confirm margins for removal of cancer. Hemostasis achieved. In total, the segment of liver removed was approximately 15 cm in length, 10cm in width, and 7cm in depth. Hemostasis achieved and the incision closed with 0-PDS for fascia followed by staples. 5 robotic port incisions closed with monocryl. No drains left in place.

## 2023-11-09 NOTE — PROGRESS NOTE ADULT - ASSESSMENT
58F s/p RA TLH RSO, sigmoidoscopy byt GYN team and cholecystectomy and liver resection from the right lobe by surgery team     Neuro: Dilaudid PCA, Toradol 15 Q8   CV: VSS    Resp: ORA   GI:  CLD s/p Cholecystectomy and partial liver resection (11/9-) Zofran PRN, Protonix QD   GYN: s/p RA TLH RSO sigmoidoscopy (11/9-)   : romero in place   DVT PPx: SCDs     [] f/u AM labs 11/10

## 2023-11-09 NOTE — PRE-ANESTHESIA EVALUATION ADULT - NSPREOPDXFT_GEN_ALL_CORE
Metastatic Colon Cancer to Liver.  Endometrial Cancer Metastatic Colon Cancer to Liver.  Endometrioid Ovarian Cancer

## 2023-11-09 NOTE — PRE-ANESTHESIA EVALUATION ADULT - NSANTHPMHFT_GEN_ALL_CORE
2018 s/p Robotic Assisted Sigmoid Resection  10/2021 s/p Open Partial Hepatectomy/Incisional Hernia Repair  10/18/23  Diagnostic Laparoscopy, Left Salpingoophorectomy

## 2023-11-09 NOTE — H&P ADULT - HIV OFFER
Unable to answer due to medical condition/unresponsive/etc...
Yes - the patient is able to be screened

## 2023-11-09 NOTE — H&P ADULT - NSHPPHYSICALEXAM_GEN_ALL_CORE
Constitutional: Pleasant, calm, conversational  Cardiac: NSR  Respiratory: Equal and bilateral chest rise. No respiratory distress.  Abdomen: Soft, NT, ND, Obese  Extremities: WWP  Vascular: 2+ pedal pulses bilaterally

## 2023-11-09 NOTE — H&P ADULT - HISTORY OF PRESENT ILLNESS
Ms. Karin Oquendo is a 58F with PMH of both endometrial cancer and colon cancer, both separate primary cancers. Patient previously underwent a left salpingoophrectomy by OBGYN last month for her endometrial cancer, and was also noted to have metastasis of her colon cancer to her liver. Patient today is presenting for a dual OBGYN-surgical oncology procedure for a total abdominal hysterectomy and right salpingoophrectomy with OBGYN as well as partial liver resection to resect her metastatic colon cancer mass in her liver. Patient is planned to be admitted postoperatively for pain control and rehabilitation.

## 2023-11-09 NOTE — BRIEF OPERATIVE NOTE - NSICDXBRIEFPROCEDURE_GEN_ALL_CORE_FT
PROCEDURES:  Extended resection of liver 09-Nov-2023 17:36:01  Masha Rodas  
PROCEDURES:  Hysterectomy, total, laparoscopic, for uterus 250 g or less 09-Nov-2023 10:32:34 Sirisha Morales  Right salpingoophorectomy 09-Nov-2023 10:32:57 Sirisha Morales

## 2023-11-09 NOTE — H&P ADULT - NSICDXPASTSURGICALHX_GEN_ALL_CORE_FT
PAST SURGICAL HISTORY:  H/O hernia repair umbilical    History of lithotripsy     History of salpingectomy     History of tonsillectomy     Status post laparoscopic-assisted sigmoidectomy

## 2023-11-09 NOTE — PRE-ANESTHESIA EVALUATION ADULT - NSANTHAPLANRD_GEN_ALL_CORE
"              After Visit Summary   7/18/2018    Kenneth Estrada    MRN: [id number]           Patient Information     Date Of Birth          2005        Visit Information        Provider Department      7/18/2018 3:40 PM Aarti Johnson MD [facility]         Follow-ups after your visit        Who to contact     If you have questions or need follow up information about today's clinic visit or your schedule please contact [facility] directly at [phone].  Normal or non-critical lab and imaging results will be communicated to you by MyChart, letter or phone within 4 business days after the clinic has received the results. If you do not hear from us within 7 days, please contact the clinic through "[employer]hart or phone. If you have a critical or abnormal lab result, we will notify you by phone as soon as possible.  Submit refill requests through [employer] or call your pharmacy and they will forward the refill request to us. Please allow 3 business days for your refill to be completed.          Additional Information About Your Visit        "[employer] Information     [employer] lets you send messages to your doctor, view your test results, renew your prescriptions, schedule appointments and more. To sign up, go to [email or web address], contact your [place] clinic or call [phone] during business hours.            Care EveryWhere ID     This is your Care EveryWhere ID. This could be used by other organizations to access your [place] medical records  [id number]        Your Vitals Were     Pulse Temperature Height Pulse Oximetry BMI (Body Mass Index)       74 98.7  F (37.1  C) (Oral) 4' 10.58\" (1.488 m) 100% 25.45 kg/m2        Blood Pressure from Last 3 Encounters:   07/18/18 101/63   07/05/18 100/60   06/28/18 91/56    Weight from Last 3 Encounters:   07/18/18 124 lb 4 oz (56.4 kg) (98 %)*   07/05/18 118 lb (53.5 kg) (96 %)*   06/28/18 118 lb 6.4 oz (53.7 kg) " (97 %)*     * Growth percentiles are based on [facility] 2-20 Years data.              Today, you had the following     No orders found for display       Primary Care Provider Office Phone # Fax #    Misty Sawyer [phone] [address] [facility] [id number]        Equal Access to Services     STEVIE IBRAHIM : Hadii aad ku hadasho Soomaali, waaxda luqadaha, qaybta kaalmada adeegyada, waxay idiin hayaan adeeg jay jay la'aan . So [facility] [phone].    ATENCIÓN: Si habla español, tiene a jesus disposición servicios gratuitos de asistencia lingüística. [facility] [phone].    We comply with applicable federal civil rights laws and [place] laws. We do not discriminate on the basis of race, color, national origin, age, disability, sex, sexual orientation, or gender identity.            Thank you!     Thank you for choosing [facility]  for your care. Our goal is always to provide you with excellent care. Hearing back from our patients is one way we can continue to improve our services. Please take a few minutes to complete the written survey that you may receive in the mail after your visit with us. Thank you!             Your Updated Medication List - Protect others around you: Learn how to safely use, store and throw away your medicines at www.disposemymeds.org.          This list is accurate as of 7/18/18  4:48 PM.  Always use your most recent med list.                   Brand Name Dispense Instructions for use Diagnosis    acetaminophen 325 MG tablet    TYLENOL    100 tablet    Take 2 tablets (650 mg) by mouth every 6 hours as needed for mild pain    Viral URI with cough       albuterol 108 (90 Base) MCG/ACT Inhaler    PROAIR HFA    1 Inhaler    Take 2 puffs 15 minutes before exercise and as needed every four hours for wheezing        cetirizine 10 MG tablet    zyrTEC    30 tablet    Take 1 tablet (10 mg) by mouth daily    Peanut allergy, Tree nut allergy        cholecalciferol 1000 UNIT tablet    vitamin D3    100 tablet    Take 1 tablet (1,000 Units) by mouth daily    Encounter for routine child health examination with abnormal findings       desonide 0.05 % ointment    DESOWEN    30 g    Apply topically 2 times daily To neck as needed (red, rough areas)    Eczema       diphenhydrAMINE 12.5 MG/5ML liquid    BENADRYL    120 mL    Take 10 mLs (25 mg) by mouth every 6 hours as needed for itching or allergies    Seasonal allergic rhinitis       * EPINEPHrine 0.3 MG/0.3ML injection 2-pack    EPIPEN/ADRENACLICK/or ANY BX GENERIC EQUIV    2 each    Inject 0.3 mLs (0.3 mg) into the muscle once as needed for anaphylaxis    Nut allergy       * EPINEPHrine 0.3 MG/0.3ML injection 2-pack    EPIPEN/ADRENACLICK/or ANY BX GENERIC EQUIV    1.2 mL    Inject 0.3 mLs (0.3 mg) into the muscle as needed for anaphylaxis    Peanut allergy, Tree nut allergy       ibuprofen 200 MG tablet    ADVIL/MOTRIN    60 tablet    Take 2 tablets (400 mg) by mouth every 6 hours as needed for mild pain    Viral URI with cough       polyethylene glycol powder    MIRALAX    510 g    Take 9 g by mouth daily.    Chronic constipation       triamcinolone 0.1 % ointment    KENALOG    45 g    Apply topically 2 times daily As needed to knees and elbows (red, rough areas)    Eczema, unspecified eczema       * Notice:  This list has 2 medication(s) that are the same as other medications prescribed for you. Read the directions carefully, and ask your doctor or other care provider to review them with you.       general

## 2023-11-09 NOTE — PROGRESS NOTE ADULT - ASSESSMENT
58F with PMH of endometrial cancer and colon cancer metastatic to the liver (separate primary cancers). S/p  presenting for ANTONIO/RSO and liver resection (11/9). POC WNL. SBP 80s, asym. x1 500cc bolus. Stat labs wnl, repleted electrolytes    Neuro: Dilaudid PCA and toradol  CV: None  Respiratory: IS  GI: NPO overnight, LR @ 100, advance diet once awake and hungry to CLD  : Ryder in, keep overnight  Heme: Decide when to restart SQH  ID: 3 doses of Ancef postop

## 2023-11-10 LAB
ALBUMIN SERPL ELPH-MCNC: 3.3 G/DL — SIGNIFICANT CHANGE UP (ref 3.3–5)
ALBUMIN SERPL ELPH-MCNC: 3.3 G/DL — SIGNIFICANT CHANGE UP (ref 3.3–5)
ALP SERPL-CCNC: 67 U/L — SIGNIFICANT CHANGE UP (ref 40–120)
ALP SERPL-CCNC: 67 U/L — SIGNIFICANT CHANGE UP (ref 40–120)
ALT FLD-CCNC: 234 U/L — HIGH (ref 10–45)
ALT FLD-CCNC: 234 U/L — HIGH (ref 10–45)
ANION GAP SERPL CALC-SCNC: 8 MMOL/L — SIGNIFICANT CHANGE UP (ref 5–17)
ANION GAP SERPL CALC-SCNC: 8 MMOL/L — SIGNIFICANT CHANGE UP (ref 5–17)
AST SERPL-CCNC: 235 U/L — HIGH (ref 10–40)
AST SERPL-CCNC: 235 U/L — HIGH (ref 10–40)
BILIRUB SERPL-MCNC: 0.6 MG/DL — SIGNIFICANT CHANGE UP (ref 0.2–1.2)
BILIRUB SERPL-MCNC: 0.6 MG/DL — SIGNIFICANT CHANGE UP (ref 0.2–1.2)
BUN SERPL-MCNC: 11 MG/DL — SIGNIFICANT CHANGE UP (ref 7–23)
BUN SERPL-MCNC: 11 MG/DL — SIGNIFICANT CHANGE UP (ref 7–23)
CALCIUM SERPL-MCNC: 8.4 MG/DL — SIGNIFICANT CHANGE UP (ref 8.4–10.5)
CALCIUM SERPL-MCNC: 8.4 MG/DL — SIGNIFICANT CHANGE UP (ref 8.4–10.5)
CHLORIDE SERPL-SCNC: 108 MMOL/L — SIGNIFICANT CHANGE UP (ref 96–108)
CHLORIDE SERPL-SCNC: 108 MMOL/L — SIGNIFICANT CHANGE UP (ref 96–108)
CO2 SERPL-SCNC: 24 MMOL/L — SIGNIFICANT CHANGE UP (ref 22–31)
CO2 SERPL-SCNC: 24 MMOL/L — SIGNIFICANT CHANGE UP (ref 22–31)
CREAT SERPL-MCNC: 0.65 MG/DL — SIGNIFICANT CHANGE UP (ref 0.5–1.3)
CREAT SERPL-MCNC: 0.65 MG/DL — SIGNIFICANT CHANGE UP (ref 0.5–1.3)
EGFR: 103 ML/MIN/1.73M2 — SIGNIFICANT CHANGE UP
EGFR: 103 ML/MIN/1.73M2 — SIGNIFICANT CHANGE UP
GLUCOSE SERPL-MCNC: 156 MG/DL — HIGH (ref 70–99)
GLUCOSE SERPL-MCNC: 156 MG/DL — HIGH (ref 70–99)
HCT VFR BLD CALC: 22.7 % — LOW (ref 34.5–45)
HCT VFR BLD CALC: 22.7 % — LOW (ref 34.5–45)
HCT VFR BLD CALC: 26.7 % — LOW (ref 34.5–45)
HCT VFR BLD CALC: 26.7 % — LOW (ref 34.5–45)
HGB BLD-MCNC: 7.3 G/DL — LOW (ref 11.5–15.5)
HGB BLD-MCNC: 7.3 G/DL — LOW (ref 11.5–15.5)
HGB BLD-MCNC: 8.6 G/DL — LOW (ref 11.5–15.5)
HGB BLD-MCNC: 8.6 G/DL — LOW (ref 11.5–15.5)
MAGNESIUM SERPL-MCNC: 2.1 MG/DL — SIGNIFICANT CHANGE UP (ref 1.6–2.6)
MAGNESIUM SERPL-MCNC: 2.1 MG/DL — SIGNIFICANT CHANGE UP (ref 1.6–2.6)
MCHC RBC-ENTMCNC: 27.1 PG — SIGNIFICANT CHANGE UP (ref 27–34)
MCHC RBC-ENTMCNC: 27.1 PG — SIGNIFICANT CHANGE UP (ref 27–34)
MCHC RBC-ENTMCNC: 27.4 PG — SIGNIFICANT CHANGE UP (ref 27–34)
MCHC RBC-ENTMCNC: 27.4 PG — SIGNIFICANT CHANGE UP (ref 27–34)
MCHC RBC-ENTMCNC: 32.2 GM/DL — SIGNIFICANT CHANGE UP (ref 32–36)
MCV RBC AUTO: 84.4 FL — SIGNIFICANT CHANGE UP (ref 80–100)
MCV RBC AUTO: 84.4 FL — SIGNIFICANT CHANGE UP (ref 80–100)
MCV RBC AUTO: 85 FL — SIGNIFICANT CHANGE UP (ref 80–100)
MCV RBC AUTO: 85 FL — SIGNIFICANT CHANGE UP (ref 80–100)
NRBC # BLD: 0 /100 WBCS — SIGNIFICANT CHANGE UP (ref 0–0)
PHOSPHATE SERPL-MCNC: 2 MG/DL — LOW (ref 2.5–4.5)
PHOSPHATE SERPL-MCNC: 2 MG/DL — LOW (ref 2.5–4.5)
PLATELET # BLD AUTO: 119 K/UL — LOW (ref 150–400)
PLATELET # BLD AUTO: 119 K/UL — LOW (ref 150–400)
PLATELET # BLD AUTO: 143 K/UL — LOW (ref 150–400)
PLATELET # BLD AUTO: 143 K/UL — LOW (ref 150–400)
POTASSIUM SERPL-MCNC: 4.3 MMOL/L — SIGNIFICANT CHANGE UP (ref 3.5–5.3)
POTASSIUM SERPL-MCNC: 4.3 MMOL/L — SIGNIFICANT CHANGE UP (ref 3.5–5.3)
POTASSIUM SERPL-SCNC: 4.3 MMOL/L — SIGNIFICANT CHANGE UP (ref 3.5–5.3)
POTASSIUM SERPL-SCNC: 4.3 MMOL/L — SIGNIFICANT CHANGE UP (ref 3.5–5.3)
PROT SERPL-MCNC: 5 G/DL — LOW (ref 6–8.3)
PROT SERPL-MCNC: 5 G/DL — LOW (ref 6–8.3)
RBC # BLD: 2.69 M/UL — LOW (ref 3.8–5.2)
RBC # BLD: 2.69 M/UL — LOW (ref 3.8–5.2)
RBC # BLD: 3.14 M/UL — LOW (ref 3.8–5.2)
RBC # BLD: 3.14 M/UL — LOW (ref 3.8–5.2)
RBC # FLD: 21.7 % — HIGH (ref 10.3–14.5)
RBC # FLD: 21.7 % — HIGH (ref 10.3–14.5)
RBC # FLD: 21.8 % — HIGH (ref 10.3–14.5)
RBC # FLD: 21.8 % — HIGH (ref 10.3–14.5)
SODIUM SERPL-SCNC: 140 MMOL/L — SIGNIFICANT CHANGE UP (ref 135–145)
SODIUM SERPL-SCNC: 140 MMOL/L — SIGNIFICANT CHANGE UP (ref 135–145)
WBC # BLD: 10.79 K/UL — HIGH (ref 3.8–10.5)
WBC # BLD: 10.79 K/UL — HIGH (ref 3.8–10.5)
WBC # BLD: 9.9 K/UL — SIGNIFICANT CHANGE UP (ref 3.8–10.5)
WBC # BLD: 9.9 K/UL — SIGNIFICANT CHANGE UP (ref 3.8–10.5)
WBC # FLD AUTO: 10.79 K/UL — HIGH (ref 3.8–10.5)
WBC # FLD AUTO: 10.79 K/UL — HIGH (ref 3.8–10.5)
WBC # FLD AUTO: 9.9 K/UL — SIGNIFICANT CHANGE UP (ref 3.8–10.5)
WBC # FLD AUTO: 9.9 K/UL — SIGNIFICANT CHANGE UP (ref 3.8–10.5)

## 2023-11-10 RX ORDER — SODIUM CHLORIDE 9 MG/ML
1000 INJECTION, SOLUTION INTRAVENOUS
Refills: 0 | Status: DISCONTINUED | OUTPATIENT
Start: 2023-11-10 | End: 2023-11-11

## 2023-11-10 RX ORDER — SODIUM CHLORIDE 9 MG/ML
500 INJECTION, SOLUTION INTRAVENOUS ONCE
Refills: 0 | Status: COMPLETED | OUTPATIENT
Start: 2023-11-10 | End: 2023-11-10

## 2023-11-10 RX ORDER — POTASSIUM PHOSPHATE, MONOBASIC POTASSIUM PHOSPHATE, DIBASIC 236; 224 MG/ML; MG/ML
15 INJECTION, SOLUTION INTRAVENOUS ONCE
Refills: 0 | Status: DISCONTINUED | OUTPATIENT
Start: 2023-11-10 | End: 2023-11-10

## 2023-11-10 RX ORDER — BENZOCAINE AND MENTHOL 5; 1 G/100ML; G/100ML
1 LIQUID ORAL
Refills: 0 | Status: DISCONTINUED | OUTPATIENT
Start: 2023-11-10 | End: 2023-11-13

## 2023-11-10 RX ORDER — POTASSIUM PHOSPHATE, MONOBASIC POTASSIUM PHOSPHATE, DIBASIC 236; 224 MG/ML; MG/ML
15 INJECTION, SOLUTION INTRAVENOUS ONCE
Refills: 0 | Status: COMPLETED | OUTPATIENT
Start: 2023-11-10 | End: 2023-11-10

## 2023-11-10 RX ORDER — DIPHENHYDRAMINE HCL 50 MG
25 CAPSULE ORAL EVERY 6 HOURS
Refills: 0 | Status: DISCONTINUED | OUTPATIENT
Start: 2023-11-10 | End: 2023-11-13

## 2023-11-10 RX ORDER — BENZOCAINE AND MENTHOL 5; 1 G/100ML; G/100ML
LIQUID ORAL
Refills: 0 | Status: DISCONTINUED | OUTPATIENT
Start: 2023-11-10 | End: 2023-11-10

## 2023-11-10 RX ORDER — BENZOCAINE AND MENTHOL 5; 1 G/100ML; G/100ML
1 LIQUID ORAL ONCE
Refills: 0 | Status: DISCONTINUED | OUTPATIENT
Start: 2023-11-10 | End: 2023-11-10

## 2023-11-10 RX ORDER — DIPHENHYDRAMINE HCL 50 MG
25 CAPSULE ORAL ONCE
Refills: 0 | Status: COMPLETED | OUTPATIENT
Start: 2023-11-10 | End: 2023-11-10

## 2023-11-10 RX ADMIN — SODIUM CHLORIDE 100 MILLILITER(S): 9 INJECTION, SOLUTION INTRAVENOUS at 13:25

## 2023-11-10 RX ADMIN — Medication 100 MILLIGRAM(S): at 12:03

## 2023-11-10 RX ADMIN — PANTOPRAZOLE SODIUM 40 MILLIGRAM(S): 20 TABLET, DELAYED RELEASE ORAL at 06:45

## 2023-11-10 RX ADMIN — Medication 25 MILLIGRAM(S): at 05:54

## 2023-11-10 RX ADMIN — POTASSIUM PHOSPHATE, MONOBASIC POTASSIUM PHOSPHATE, DIBASIC 62.5 MILLIMOLE(S): 236; 224 INJECTION, SOLUTION INTRAVENOUS at 15:09

## 2023-11-10 RX ADMIN — SODIUM CHLORIDE 500 MILLILITER(S): 9 INJECTION, SOLUTION INTRAVENOUS at 07:18

## 2023-11-10 RX ADMIN — Medication 15 MILLIGRAM(S): at 05:00

## 2023-11-10 RX ADMIN — Medication 100 MILLIGRAM(S): at 04:44

## 2023-11-10 RX ADMIN — SODIUM CHLORIDE 500 MILLILITER(S): 9 INJECTION, SOLUTION INTRAVENOUS at 08:47

## 2023-11-10 RX ADMIN — Medication 15 MILLIGRAM(S): at 12:03

## 2023-11-10 RX ADMIN — Medication 15 MILLIGRAM(S): at 13:00

## 2023-11-10 RX ADMIN — Medication 15 MILLIGRAM(S): at 04:44

## 2023-11-10 NOTE — PROGRESS NOTE ADULT - ASSESSMENT
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 Jimenez  Neuro: Dilaudid PCA and toradol  CV: s/p 1u pRBCs intraop. Mildly tachycardic   Respiratory: IS  GI: CLD, LR @ 100  : Ryder  Heme: Hb 14.1>(1u pRBCs intraop)>9.5>8.6  ID: 3 doses of Ancef postop    Care as per primary team. GYN will continue to follow

## 2023-11-10 NOTE — PROGRESS NOTE ADULT - ASSESSMENT
58F with PMH of endometrial cancer and colon cancer metastatic to the liver (separate primary cancers). S/p  presenting for ANTONIO/RSO and liver resection (11/9).    CLD/IVF  Pain & nausea prn  SCDs/IS  Ryder  PT cs

## 2023-11-11 LAB
ALBUMIN SERPL ELPH-MCNC: 3.2 G/DL — LOW (ref 3.3–5)
ALBUMIN SERPL ELPH-MCNC: 3.2 G/DL — LOW (ref 3.3–5)
ALP SERPL-CCNC: 73 U/L — SIGNIFICANT CHANGE UP (ref 40–120)
ALP SERPL-CCNC: 73 U/L — SIGNIFICANT CHANGE UP (ref 40–120)
ALT FLD-CCNC: 235 U/L — HIGH (ref 10–45)
ALT FLD-CCNC: 235 U/L — HIGH (ref 10–45)
ANION GAP SERPL CALC-SCNC: 6 MMOL/L — SIGNIFICANT CHANGE UP (ref 5–17)
ANION GAP SERPL CALC-SCNC: 6 MMOL/L — SIGNIFICANT CHANGE UP (ref 5–17)
APTT BLD: 26.5 SEC — SIGNIFICANT CHANGE UP (ref 24.5–35.6)
APTT BLD: 26.5 SEC — SIGNIFICANT CHANGE UP (ref 24.5–35.6)
AST SERPL-CCNC: 188 U/L — HIGH (ref 10–40)
AST SERPL-CCNC: 188 U/L — HIGH (ref 10–40)
BILIRUB DIRECT SERPL-MCNC: 0.3 MG/DL — SIGNIFICANT CHANGE UP (ref 0–0.3)
BILIRUB DIRECT SERPL-MCNC: 0.3 MG/DL — SIGNIFICANT CHANGE UP (ref 0–0.3)
BILIRUB INDIRECT FLD-MCNC: 0.7 MG/DL — SIGNIFICANT CHANGE UP (ref 0.2–1)
BILIRUB INDIRECT FLD-MCNC: 0.7 MG/DL — SIGNIFICANT CHANGE UP (ref 0.2–1)
BILIRUB SERPL-MCNC: 0.9 MG/DL — SIGNIFICANT CHANGE UP (ref 0.2–1.2)
BILIRUB SERPL-MCNC: 0.9 MG/DL — SIGNIFICANT CHANGE UP (ref 0.2–1.2)
BUN SERPL-MCNC: 10 MG/DL — SIGNIFICANT CHANGE UP (ref 7–23)
BUN SERPL-MCNC: 10 MG/DL — SIGNIFICANT CHANGE UP (ref 7–23)
CALCIUM SERPL-MCNC: 8.6 MG/DL — SIGNIFICANT CHANGE UP (ref 8.4–10.5)
CALCIUM SERPL-MCNC: 8.6 MG/DL — SIGNIFICANT CHANGE UP (ref 8.4–10.5)
CHLORIDE SERPL-SCNC: 106 MMOL/L — SIGNIFICANT CHANGE UP (ref 96–108)
CHLORIDE SERPL-SCNC: 106 MMOL/L — SIGNIFICANT CHANGE UP (ref 96–108)
CO2 SERPL-SCNC: 29 MMOL/L — SIGNIFICANT CHANGE UP (ref 22–31)
CO2 SERPL-SCNC: 29 MMOL/L — SIGNIFICANT CHANGE UP (ref 22–31)
CREAT SERPL-MCNC: 0.68 MG/DL — SIGNIFICANT CHANGE UP (ref 0.5–1.3)
CREAT SERPL-MCNC: 0.68 MG/DL — SIGNIFICANT CHANGE UP (ref 0.5–1.3)
EGFR: 102 ML/MIN/1.73M2 — SIGNIFICANT CHANGE UP
EGFR: 102 ML/MIN/1.73M2 — SIGNIFICANT CHANGE UP
GLUCOSE SERPL-MCNC: 93 MG/DL — SIGNIFICANT CHANGE UP (ref 70–99)
GLUCOSE SERPL-MCNC: 93 MG/DL — SIGNIFICANT CHANGE UP (ref 70–99)
HCT VFR BLD CALC: 29.6 % — LOW (ref 34.5–45)
HCT VFR BLD CALC: 29.6 % — LOW (ref 34.5–45)
HGB BLD-MCNC: 9.3 G/DL — LOW (ref 11.5–15.5)
HGB BLD-MCNC: 9.3 G/DL — LOW (ref 11.5–15.5)
INR BLD: 1.03 — SIGNIFICANT CHANGE UP (ref 0.85–1.18)
INR BLD: 1.03 — SIGNIFICANT CHANGE UP (ref 0.85–1.18)
MAGNESIUM SERPL-MCNC: 2 MG/DL — SIGNIFICANT CHANGE UP (ref 1.6–2.6)
MAGNESIUM SERPL-MCNC: 2 MG/DL — SIGNIFICANT CHANGE UP (ref 1.6–2.6)
MCHC RBC-ENTMCNC: 27.1 PG — SIGNIFICANT CHANGE UP (ref 27–34)
MCHC RBC-ENTMCNC: 27.1 PG — SIGNIFICANT CHANGE UP (ref 27–34)
MCHC RBC-ENTMCNC: 31.4 GM/DL — LOW (ref 32–36)
MCHC RBC-ENTMCNC: 31.4 GM/DL — LOW (ref 32–36)
MCV RBC AUTO: 86.3 FL — SIGNIFICANT CHANGE UP (ref 80–100)
MCV RBC AUTO: 86.3 FL — SIGNIFICANT CHANGE UP (ref 80–100)
NRBC # BLD: 0 /100 WBCS — SIGNIFICANT CHANGE UP (ref 0–0)
NRBC # BLD: 0 /100 WBCS — SIGNIFICANT CHANGE UP (ref 0–0)
PHOSPHATE SERPL-MCNC: 2.4 MG/DL — LOW (ref 2.5–4.5)
PHOSPHATE SERPL-MCNC: 2.4 MG/DL — LOW (ref 2.5–4.5)
PLATELET # BLD AUTO: 101 K/UL — LOW (ref 150–400)
PLATELET # BLD AUTO: 101 K/UL — LOW (ref 150–400)
POTASSIUM SERPL-MCNC: 4.5 MMOL/L — SIGNIFICANT CHANGE UP (ref 3.5–5.3)
POTASSIUM SERPL-MCNC: 4.5 MMOL/L — SIGNIFICANT CHANGE UP (ref 3.5–5.3)
POTASSIUM SERPL-SCNC: 4.5 MMOL/L — SIGNIFICANT CHANGE UP (ref 3.5–5.3)
POTASSIUM SERPL-SCNC: 4.5 MMOL/L — SIGNIFICANT CHANGE UP (ref 3.5–5.3)
PROT SERPL-MCNC: 5 G/DL — LOW (ref 6–8.3)
PROT SERPL-MCNC: 5 G/DL — LOW (ref 6–8.3)
PROTHROM AB SERPL-ACNC: 11.7 SEC — SIGNIFICANT CHANGE UP (ref 9.5–13)
PROTHROM AB SERPL-ACNC: 11.7 SEC — SIGNIFICANT CHANGE UP (ref 9.5–13)
RBC # BLD: 3.43 M/UL — LOW (ref 3.8–5.2)
RBC # BLD: 3.43 M/UL — LOW (ref 3.8–5.2)
RBC # FLD: 20.8 % — HIGH (ref 10.3–14.5)
RBC # FLD: 20.8 % — HIGH (ref 10.3–14.5)
SODIUM SERPL-SCNC: 141 MMOL/L — SIGNIFICANT CHANGE UP (ref 135–145)
SODIUM SERPL-SCNC: 141 MMOL/L — SIGNIFICANT CHANGE UP (ref 135–145)
WBC # BLD: 11.69 K/UL — HIGH (ref 3.8–10.5)
WBC # BLD: 11.69 K/UL — HIGH (ref 3.8–10.5)
WBC # FLD AUTO: 11.69 K/UL — HIGH (ref 3.8–10.5)
WBC # FLD AUTO: 11.69 K/UL — HIGH (ref 3.8–10.5)

## 2023-11-11 RX ORDER — HEPARIN SODIUM 5000 [USP'U]/ML
5000 INJECTION INTRAVENOUS; SUBCUTANEOUS EVERY 12 HOURS
Refills: 0 | Status: DISCONTINUED | OUTPATIENT
Start: 2023-11-11 | End: 2023-11-13

## 2023-11-11 RX ORDER — ACETAMINOPHEN 500 MG
1000 TABLET ORAL ONCE
Refills: 0 | Status: COMPLETED | OUTPATIENT
Start: 2023-11-11 | End: 2023-11-11

## 2023-11-11 RX ORDER — SODIUM CHLORIDE 9 MG/ML
1000 INJECTION, SOLUTION INTRAVENOUS
Refills: 0 | Status: DISCONTINUED | OUTPATIENT
Start: 2023-11-11 | End: 2023-11-12

## 2023-11-11 RX ORDER — POTASSIUM PHOSPHATE, MONOBASIC POTASSIUM PHOSPHATE, DIBASIC 236; 224 MG/ML; MG/ML
15 INJECTION, SOLUTION INTRAVENOUS ONCE
Refills: 0 | Status: COMPLETED | OUTPATIENT
Start: 2023-11-11 | End: 2023-11-11

## 2023-11-11 RX ORDER — ACETAMINOPHEN 500 MG
650 TABLET ORAL ONCE
Refills: 0 | Status: DISCONTINUED | OUTPATIENT
Start: 2023-11-11 | End: 2023-11-11

## 2023-11-11 RX ORDER — HYDROCORTISONE 1 %
1 OINTMENT (GRAM) TOPICAL ONCE
Refills: 0 | Status: COMPLETED | OUTPATIENT
Start: 2023-11-11 | End: 2023-11-12

## 2023-11-11 RX ADMIN — POTASSIUM PHOSPHATE, MONOBASIC POTASSIUM PHOSPHATE, DIBASIC 62.5 MILLIMOLE(S): 236; 224 INJECTION, SOLUTION INTRAVENOUS at 12:37

## 2023-11-11 RX ADMIN — BENZOCAINE AND MENTHOL 1 LOZENGE: 5; 1 LIQUID ORAL at 05:45

## 2023-11-11 RX ADMIN — HEPARIN SODIUM 5000 UNIT(S): 5000 INJECTION INTRAVENOUS; SUBCUTANEOUS at 23:39

## 2023-11-11 RX ADMIN — Medication 25 MILLIGRAM(S): at 00:24

## 2023-11-11 RX ADMIN — Medication 1000 MILLIGRAM(S): at 11:40

## 2023-11-11 RX ADMIN — SODIUM CHLORIDE 80 MILLILITER(S): 9 INJECTION, SOLUTION INTRAVENOUS at 07:55

## 2023-11-11 RX ADMIN — Medication 400 MILLIGRAM(S): at 10:10

## 2023-11-11 RX ADMIN — Medication 400 MILLIGRAM(S): at 20:53

## 2023-11-11 RX ADMIN — HYDROMORPHONE HYDROCHLORIDE 30 MILLILITER(S): 2 INJECTION INTRAMUSCULAR; INTRAVENOUS; SUBCUTANEOUS at 14:40

## 2023-11-11 RX ADMIN — Medication 25 MILLIGRAM(S): at 19:35

## 2023-11-11 RX ADMIN — Medication 1000 MILLIGRAM(S): at 21:15

## 2023-11-11 RX ADMIN — SODIUM CHLORIDE 40 MILLILITER(S): 9 INJECTION, SOLUTION INTRAVENOUS at 10:45

## 2023-11-11 RX ADMIN — HEPARIN SODIUM 5000 UNIT(S): 5000 INJECTION INTRAVENOUS; SUBCUTANEOUS at 11:32

## 2023-11-11 RX ADMIN — Medication 1000 MILLIGRAM(S): at 01:41

## 2023-11-11 RX ADMIN — PANTOPRAZOLE SODIUM 40 MILLIGRAM(S): 20 TABLET, DELAYED RELEASE ORAL at 06:09

## 2023-11-11 RX ADMIN — Medication 400 MILLIGRAM(S): at 01:21

## 2023-11-11 NOTE — PHYSICAL THERAPY INITIAL EVALUATION ADULT - AMBULATION SKILLS, REHAB EVAL
Delivery Note    Zayda Gill is a 34 year old now  female post-delivery at 41w1d.  Pregnancy was significant for routine prenatal care.    Mother's Information     Labor Length     Hours:   Minutes:     1st stage:   3 0   2nd stage:   0 22   3rd stage:   0 3      Delivery (Maternal)    Episiotomy:  None   Perineal lacerations:  None    Periurethral laceration:  bilateral Repaired:  Yes   Vaginal delivery est. blood loss (mL):  200   Repair suture:  2-0 Vicryl   Number of repair packets:  1      IO Blood Loss  17 1330 - 17 1913    Vaginal delivery EBL (mL) Hospital Encounter 200    Total  200            Al Gill [[id number]]     Delivery ([place])    Birth date/time:  2015 1652   Delivery type:  Vaginal, Spontaneous Delivery             Labor Events     labor?:  No    steroids?:  None   Rupture date:  17 Rupture time:  121   Rupture type:  Artificial   Fluid color:  Meconium   Fluid odor:  Normal   Induction:  Oxytocin, AROM   Induction date:  17 Induction time:  713   Induction indications:  Post-term Gestation   Augmentation:  Oxytocin   Augmentation indications:  Ineffective Contraction Pattern   Labor/Delivery complications:  None                Anesthesia    Method:  Epidural             Assisted Delivery Details:    Forceps attempted?:  No   Vacuum extractor attempted?:  No             Shoulder Dystocia    Shoulder dystocia present?:  No          Delivery Procedures    Procedures:  None          Presentation and Position    Presentation:   Vertex   Position:   Left    Occiput    Anterior             Placenta    Date and time:  2017  4:55 PM   Removal:  Spontaneous   Appearance:  Intact   Placenta disposition:  discarded          Cord    Vessels:  3 Vessels   Complications:  None   Delayed Cord Clamping?:  No   Cord Blood Disposition:  Lab   Gases Sent?:  Yes   Cord Comments:  Arterial=7.33, Venous=7.29   Stem Cell Collection (by MD):  No            Assessment    Living status:  Living   Apgars:      1 Minute:   5 Minute:   10 Minute:   15 Minute:   20 Minute:     Skin Color:   1  1       Heart Rate:   2  2       Reflex Irritability:   2  2       Muscle Tone:   2  2       Respiratory Effort:   2  2       Total:   9  9                  Apgars Assigned By:  SOFÍA LAU          Resuscitation    Method:  Suctioning           Measurements    Weight:  3460 g Length:  50.8 cm   Head circum.:  35 cm           Delivery Providers    Delivering clinician:  Kimmie Coulter MD   Other personnel:   Provider Role   Elo Don RN Delivery Nurse   Tanya Wei RN Baby Nurse   JERILYN Andrade Nurse Practitioner                        Review the Delivery Report for details       Kimmie Coulter MD       independent

## 2023-11-11 NOTE — PROGRESS NOTE ADULT - ASSESSMENT
58F s/p RA TLH RSO, sigmoidoscopy byt GYN team and cholecystectomy and liver resection from the right lobe by surgery team     - Please encourage ambulation and d/c purewick if general surgery team agrees  - Encourage IS    Neuro: Dilaudid PCA, Toradol 15 Q8     CV: VSS      Resp: ORA     GI: LR 80 CLD s/p Cholecystectomy and partial liver resection (11/9-) Zofran PRN, Protonix QD     GYN: s/p RA TLH RSO sigmoidoscopy (11/9)     ID: Afebrile, Ancef q8 x 3 doses (11/9-)     DVT PPx: SCDs  58F s/p RA TLH RSO, sigmoidoscopy byt GYN team and cholecystectomy and liver resection from the right lobe by surgery team     - Please encourage ambulation and d/c purewick if general surgery team agrees  - Encourage IS  - Recommend repleting phosphorus    Neuro: Dilaudid PCA, Toradol 15 Q8     CV: VSS      Resp: ORA     GI: LR 80 CLD s/p Cholecystectomy and partial liver resection (11/9-) Zofran PRN, Protonix QD     GYN: s/p RA TL RSO sigmoidoscopy (11/9)     ID: Afebrile, Ancef q8 x 3 doses (11/9-)     DVT PPx: SCDs

## 2023-11-11 NOTE — PHYSICAL THERAPY INITIAL EVALUATION ADULT - ADDITIONAL COMMENTS
Pt was IND PTA, pt lives in a 3rd floor walk up, however, will be staying w/ a friend in an elevator building once dc'd from [facility]

## 2023-11-11 NOTE — PHYSICAL THERAPY INITIAL EVALUATION ADULT - DID THE PATIENT HAVE SURGERY?
Extended resection of liver, Hysterectomy, total, laparoscopic, for uterus, Right salpingoophorectomy/yes

## 2023-11-12 LAB
ANION GAP SERPL CALC-SCNC: 8 MMOL/L — SIGNIFICANT CHANGE UP (ref 5–17)
ANION GAP SERPL CALC-SCNC: 8 MMOL/L — SIGNIFICANT CHANGE UP (ref 5–17)
BUN SERPL-MCNC: 5 MG/DL — LOW (ref 7–23)
BUN SERPL-MCNC: 5 MG/DL — LOW (ref 7–23)
CALCIUM SERPL-MCNC: 8.7 MG/DL — SIGNIFICANT CHANGE UP (ref 8.4–10.5)
CALCIUM SERPL-MCNC: 8.7 MG/DL — SIGNIFICANT CHANGE UP (ref 8.4–10.5)
CHLORIDE SERPL-SCNC: 106 MMOL/L — SIGNIFICANT CHANGE UP (ref 96–108)
CHLORIDE SERPL-SCNC: 106 MMOL/L — SIGNIFICANT CHANGE UP (ref 96–108)
CO2 SERPL-SCNC: 27 MMOL/L — SIGNIFICANT CHANGE UP (ref 22–31)
CO2 SERPL-SCNC: 27 MMOL/L — SIGNIFICANT CHANGE UP (ref 22–31)
CREAT SERPL-MCNC: 0.57 MG/DL — SIGNIFICANT CHANGE UP (ref 0.5–1.3)
CREAT SERPL-MCNC: 0.57 MG/DL — SIGNIFICANT CHANGE UP (ref 0.5–1.3)
EGFR: 107 ML/MIN/1.73M2 — SIGNIFICANT CHANGE UP
EGFR: 107 ML/MIN/1.73M2 — SIGNIFICANT CHANGE UP
GLUCOSE SERPL-MCNC: 108 MG/DL — HIGH (ref 70–99)
GLUCOSE SERPL-MCNC: 108 MG/DL — HIGH (ref 70–99)
HCT VFR BLD CALC: 33.3 % — LOW (ref 34.5–45)
HCT VFR BLD CALC: 33.3 % — LOW (ref 34.5–45)
HGB BLD-MCNC: 10.5 G/DL — LOW (ref 11.5–15.5)
HGB BLD-MCNC: 10.5 G/DL — LOW (ref 11.5–15.5)
MAGNESIUM SERPL-MCNC: 1.7 MG/DL — SIGNIFICANT CHANGE UP (ref 1.6–2.6)
MAGNESIUM SERPL-MCNC: 1.7 MG/DL — SIGNIFICANT CHANGE UP (ref 1.6–2.6)
MCHC RBC-ENTMCNC: 27.3 PG — SIGNIFICANT CHANGE UP (ref 27–34)
MCHC RBC-ENTMCNC: 27.3 PG — SIGNIFICANT CHANGE UP (ref 27–34)
MCHC RBC-ENTMCNC: 31.5 GM/DL — LOW (ref 32–36)
MCHC RBC-ENTMCNC: 31.5 GM/DL — LOW (ref 32–36)
MCV RBC AUTO: 86.7 FL — SIGNIFICANT CHANGE UP (ref 80–100)
MCV RBC AUTO: 86.7 FL — SIGNIFICANT CHANGE UP (ref 80–100)
NRBC # BLD: 0 /100 WBCS — SIGNIFICANT CHANGE UP (ref 0–0)
NRBC # BLD: 0 /100 WBCS — SIGNIFICANT CHANGE UP (ref 0–0)
PHOSPHATE SERPL-MCNC: 3 MG/DL — SIGNIFICANT CHANGE UP (ref 2.5–4.5)
PHOSPHATE SERPL-MCNC: 3 MG/DL — SIGNIFICANT CHANGE UP (ref 2.5–4.5)
PLATELET # BLD AUTO: 142 K/UL — LOW (ref 150–400)
PLATELET # BLD AUTO: 142 K/UL — LOW (ref 150–400)
POTASSIUM SERPL-MCNC: 3.5 MMOL/L — SIGNIFICANT CHANGE UP (ref 3.5–5.3)
POTASSIUM SERPL-MCNC: 3.5 MMOL/L — SIGNIFICANT CHANGE UP (ref 3.5–5.3)
POTASSIUM SERPL-SCNC: 3.5 MMOL/L — SIGNIFICANT CHANGE UP (ref 3.5–5.3)
POTASSIUM SERPL-SCNC: 3.5 MMOL/L — SIGNIFICANT CHANGE UP (ref 3.5–5.3)
RBC # BLD: 3.84 M/UL — SIGNIFICANT CHANGE UP (ref 3.8–5.2)
RBC # BLD: 3.84 M/UL — SIGNIFICANT CHANGE UP (ref 3.8–5.2)
RBC # FLD: 19.9 % — HIGH (ref 10.3–14.5)
RBC # FLD: 19.9 % — HIGH (ref 10.3–14.5)
SODIUM SERPL-SCNC: 141 MMOL/L — SIGNIFICANT CHANGE UP (ref 135–145)
SODIUM SERPL-SCNC: 141 MMOL/L — SIGNIFICANT CHANGE UP (ref 135–145)
WBC # BLD: 10.97 K/UL — HIGH (ref 3.8–10.5)
WBC # BLD: 10.97 K/UL — HIGH (ref 3.8–10.5)
WBC # FLD AUTO: 10.97 K/UL — HIGH (ref 3.8–10.5)
WBC # FLD AUTO: 10.97 K/UL — HIGH (ref 3.8–10.5)

## 2023-11-12 RX ORDER — HYDROCORTISONE 1 %
1 OINTMENT (GRAM) TOPICAL ONCE
Refills: 0 | Status: COMPLETED | OUTPATIENT
Start: 2023-11-12 | End: 2023-11-13

## 2023-11-12 RX ORDER — ACETAMINOPHEN 500 MG
975 TABLET ORAL EVERY 6 HOURS
Refills: 0 | Status: DISCONTINUED | OUTPATIENT
Start: 2023-11-12 | End: 2023-11-12

## 2023-11-12 RX ORDER — ACETAMINOPHEN 500 MG
650 TABLET ORAL EVERY 6 HOURS
Refills: 0 | Status: DISCONTINUED | OUTPATIENT
Start: 2023-11-12 | End: 2023-11-12

## 2023-11-12 RX ORDER — ACETAMINOPHEN 500 MG
1000 TABLET ORAL ONCE
Refills: 0 | Status: COMPLETED | OUTPATIENT
Start: 2023-11-12 | End: 2023-11-12

## 2023-11-12 RX ORDER — POTASSIUM CHLORIDE 20 MEQ
10 PACKET (EA) ORAL
Refills: 0 | Status: DISCONTINUED | OUTPATIENT
Start: 2023-11-12 | End: 2023-11-12

## 2023-11-12 RX ORDER — TRAMADOL HYDROCHLORIDE 50 MG/1
25 TABLET ORAL EVERY 6 HOURS
Refills: 0 | Status: DISCONTINUED | OUTPATIENT
Start: 2023-11-12 | End: 2023-11-13

## 2023-11-12 RX ORDER — POTASSIUM CHLORIDE 20 MEQ
40 PACKET (EA) ORAL ONCE
Refills: 0 | Status: COMPLETED | OUTPATIENT
Start: 2023-11-12 | End: 2023-11-12

## 2023-11-12 RX ORDER — HYDROMORPHONE HYDROCHLORIDE 2 MG/ML
1 INJECTION INTRAMUSCULAR; INTRAVENOUS; SUBCUTANEOUS EVERY 4 HOURS
Refills: 0 | Status: DISCONTINUED | OUTPATIENT
Start: 2023-11-12 | End: 2023-11-12

## 2023-11-12 RX ORDER — MAGNESIUM SULFATE 500 MG/ML
2 VIAL (ML) INJECTION ONCE
Refills: 0 | Status: COMPLETED | OUTPATIENT
Start: 2023-11-12 | End: 2023-11-12

## 2023-11-12 RX ADMIN — Medication 5 MILLIGRAM(S): at 23:44

## 2023-11-12 RX ADMIN — PANTOPRAZOLE SODIUM 40 MILLIGRAM(S): 20 TABLET, DELAYED RELEASE ORAL at 06:06

## 2023-11-12 RX ADMIN — Medication 1000 MILLIGRAM(S): at 14:47

## 2023-11-12 RX ADMIN — TRAMADOL HYDROCHLORIDE 25 MILLIGRAM(S): 50 TABLET ORAL at 17:41

## 2023-11-12 RX ADMIN — Medication 40 MILLIEQUIVALENT(S): at 11:22

## 2023-11-12 RX ADMIN — Medication 25 GRAM(S): at 09:19

## 2023-11-12 RX ADMIN — BENZOCAINE AND MENTHOL 1 LOZENGE: 5; 1 LIQUID ORAL at 06:06

## 2023-11-12 RX ADMIN — TRAMADOL HYDROCHLORIDE 25 MILLIGRAM(S): 50 TABLET ORAL at 12:08

## 2023-11-12 RX ADMIN — Medication 400 MILLIGRAM(S): at 14:17

## 2023-11-12 RX ADMIN — HEPARIN SODIUM 5000 UNIT(S): 5000 INJECTION INTRAVENOUS; SUBCUTANEOUS at 11:22

## 2023-11-12 RX ADMIN — TRAMADOL HYDROCHLORIDE 25 MILLIGRAM(S): 50 TABLET ORAL at 23:44

## 2023-11-12 RX ADMIN — Medication 1 APPLICATION(S): at 07:09

## 2023-11-12 RX ADMIN — Medication 400 MILLIGRAM(S): at 08:18

## 2023-11-12 RX ADMIN — TRAMADOL HYDROCHLORIDE 25 MILLIGRAM(S): 50 TABLET ORAL at 11:57

## 2023-11-12 RX ADMIN — HEPARIN SODIUM 5000 UNIT(S): 5000 INJECTION INTRAVENOUS; SUBCUTANEOUS at 23:44

## 2023-11-12 NOTE — PROGRESS NOTE ADULT - ASSESSMENT
58F with PMH of endometrial cancer and colon cancer metastatic to the liver (separate primary cancers). S/p  ANTONIO/RSO and liver resection (11/9). Patient tolerating clears, passing gas, doing well for POD 3.     Plan   Advance to regular diet  Monitor bowel function   Can stepdown   Plan discussed with chief resident on call

## 2023-11-12 NOTE — PROGRESS NOTE ADULT - ASSESSMENT
58F s/p RA TLH RSO, sigmoidoscopy by GYN team and cholecystectomy and liver resection from the right lobe by surgery team     Neuro: Dilaudid PCA   CV: VSS    Resp: ORA, required 2L on 11/10 PM   GI: D5NS 40cc/hr, CLD s/p Cholecystectomy and partial liver resection (11/9) Zofran PRN, Protonix QD   GYN: s/p RA TLH RSO sigmoidoscopy (11/9)   : voiding   Heme: Thrombocytopenia, gen surg thinks 2/2 bleeding   ID: Afebrile, s/p Ancef q8 x 3 doses (11/9-11/10)   DVT PPx: SCDs, SQH     Continue primary management per General Surgery Team  F/u AM Labs  Encourage ambulation 58F s/p RA TLH RSO, sigmoidoscopy by GYN team and cholecystectomy and liver resection from the right lobe by surgery team     Neuro: Dilaudid PCA   CV: VSS    Resp: ORA, required 2L on 11/10 PM   GI: D5NS 40cc/hr, reg diet this AM, s/p Cholecystectomy and partial liver resection (11/9) Zofran PRN, Protonix QD   GYN: s/p RA TLH RSO sigmoidoscopy (11/9)   : voiding   Heme: Thrombocytopenia, gen surg thinks 2/2 bleeding   ID: Afebrile, s/p Ancef q8 x 3 doses (11/9-11/10)   DVT PPx: SCDs, SQH     Continue primary management per General Surgery Team  F/u AM Labs  Encourage ambulation 58F s/p RA TLH RSO, sigmoidoscopy by GYN team and cholecystectomy and liver resection from the right lobe by surgery team     Neuro: Dilaudid PCA   CV: VSS    Resp: ORA, required 2L on 11/10 PM   GI: D5NS 40cc/hr, reg diet this AM, s/p Cholecystectomy and partial liver resection (11/9) Zofran PRN, Protonix QD   GYN: s/p RA TLH RSO sigmoidoscopy (11/9)   : voiding   Heme: Thrombocytopenia, gen surg thinks 2/2 bleeding   ID: Afebrile, s/p Ancef q8 x 3 doses (11/9-11/10)   DVT PPx: SCDs, SQH     Continue primary management per General Surgery Team  Encourage ambulation and ISS

## 2023-11-13 ENCOUNTER — TRANSCRIPTION ENCOUNTER (OUTPATIENT)
Age: 58
End: 2023-11-13

## 2023-11-13 VITALS
RESPIRATION RATE: 16 BRPM | SYSTOLIC BLOOD PRESSURE: 105 MMHG | OXYGEN SATURATION: 94 % | HEART RATE: 88 BPM | TEMPERATURE: 98 F | DIASTOLIC BLOOD PRESSURE: 72 MMHG

## 2023-11-13 LAB
ANION GAP SERPL CALC-SCNC: 9 MMOL/L — SIGNIFICANT CHANGE UP (ref 5–17)
ANION GAP SERPL CALC-SCNC: 9 MMOL/L — SIGNIFICANT CHANGE UP (ref 5–17)
BUN SERPL-MCNC: 9 MG/DL — SIGNIFICANT CHANGE UP (ref 7–23)
BUN SERPL-MCNC: 9 MG/DL — SIGNIFICANT CHANGE UP (ref 7–23)
CALCIUM SERPL-MCNC: 8.7 MG/DL — SIGNIFICANT CHANGE UP (ref 8.4–10.5)
CALCIUM SERPL-MCNC: 8.7 MG/DL — SIGNIFICANT CHANGE UP (ref 8.4–10.5)
CHLORIDE SERPL-SCNC: 106 MMOL/L — SIGNIFICANT CHANGE UP (ref 96–108)
CHLORIDE SERPL-SCNC: 106 MMOL/L — SIGNIFICANT CHANGE UP (ref 96–108)
CO2 SERPL-SCNC: 25 MMOL/L — SIGNIFICANT CHANGE UP (ref 22–31)
CO2 SERPL-SCNC: 25 MMOL/L — SIGNIFICANT CHANGE UP (ref 22–31)
CREAT SERPL-MCNC: 0.6 MG/DL — SIGNIFICANT CHANGE UP (ref 0.5–1.3)
CREAT SERPL-MCNC: 0.6 MG/DL — SIGNIFICANT CHANGE UP (ref 0.5–1.3)
EGFR: 105 ML/MIN/1.73M2 — SIGNIFICANT CHANGE UP
EGFR: 105 ML/MIN/1.73M2 — SIGNIFICANT CHANGE UP
GLUCOSE SERPL-MCNC: 102 MG/DL — HIGH (ref 70–99)
GLUCOSE SERPL-MCNC: 102 MG/DL — HIGH (ref 70–99)
HCT VFR BLD CALC: 29.1 % — LOW (ref 34.5–45)
HCT VFR BLD CALC: 29.1 % — LOW (ref 34.5–45)
HGB BLD-MCNC: 9.5 G/DL — LOW (ref 11.5–15.5)
HGB BLD-MCNC: 9.5 G/DL — LOW (ref 11.5–15.5)
MAGNESIUM SERPL-MCNC: 1.8 MG/DL — SIGNIFICANT CHANGE UP (ref 1.6–2.6)
MAGNESIUM SERPL-MCNC: 1.8 MG/DL — SIGNIFICANT CHANGE UP (ref 1.6–2.6)
MCHC RBC-ENTMCNC: 28 PG — SIGNIFICANT CHANGE UP (ref 27–34)
MCHC RBC-ENTMCNC: 28 PG — SIGNIFICANT CHANGE UP (ref 27–34)
MCHC RBC-ENTMCNC: 32.6 GM/DL — SIGNIFICANT CHANGE UP (ref 32–36)
MCHC RBC-ENTMCNC: 32.6 GM/DL — SIGNIFICANT CHANGE UP (ref 32–36)
MCV RBC AUTO: 85.8 FL — SIGNIFICANT CHANGE UP (ref 80–100)
MCV RBC AUTO: 85.8 FL — SIGNIFICANT CHANGE UP (ref 80–100)
NRBC # BLD: 0 /100 WBCS — SIGNIFICANT CHANGE UP (ref 0–0)
NRBC # BLD: 0 /100 WBCS — SIGNIFICANT CHANGE UP (ref 0–0)
PHOSPHATE SERPL-MCNC: 3 MG/DL — SIGNIFICANT CHANGE UP (ref 2.5–4.5)
PHOSPHATE SERPL-MCNC: 3 MG/DL — SIGNIFICANT CHANGE UP (ref 2.5–4.5)
PLATELET # BLD AUTO: 151 K/UL — SIGNIFICANT CHANGE UP (ref 150–400)
PLATELET # BLD AUTO: 151 K/UL — SIGNIFICANT CHANGE UP (ref 150–400)
POTASSIUM SERPL-MCNC: 3.8 MMOL/L — SIGNIFICANT CHANGE UP (ref 3.5–5.3)
POTASSIUM SERPL-MCNC: 3.8 MMOL/L — SIGNIFICANT CHANGE UP (ref 3.5–5.3)
POTASSIUM SERPL-SCNC: 3.8 MMOL/L — SIGNIFICANT CHANGE UP (ref 3.5–5.3)
POTASSIUM SERPL-SCNC: 3.8 MMOL/L — SIGNIFICANT CHANGE UP (ref 3.5–5.3)
RBC # BLD: 3.39 M/UL — LOW (ref 3.8–5.2)
RBC # BLD: 3.39 M/UL — LOW (ref 3.8–5.2)
RBC # FLD: 19 % — HIGH (ref 10.3–14.5)
RBC # FLD: 19 % — HIGH (ref 10.3–14.5)
SODIUM SERPL-SCNC: 140 MMOL/L — SIGNIFICANT CHANGE UP (ref 135–145)
SODIUM SERPL-SCNC: 140 MMOL/L — SIGNIFICANT CHANGE UP (ref 135–145)
WBC # BLD: 9.26 K/UL — SIGNIFICANT CHANGE UP (ref 3.8–10.5)
WBC # BLD: 9.26 K/UL — SIGNIFICANT CHANGE UP (ref 3.8–10.5)
WBC # FLD AUTO: 9.26 K/UL — SIGNIFICANT CHANGE UP (ref 3.8–10.5)
WBC # FLD AUTO: 9.26 K/UL — SIGNIFICANT CHANGE UP (ref 3.8–10.5)

## 2023-11-13 PROCEDURE — 86850 RBC ANTIBODY SCREEN: CPT

## 2023-11-13 PROCEDURE — 84132 ASSAY OF SERUM POTASSIUM: CPT

## 2023-11-13 PROCEDURE — 83735 ASSAY OF MAGNESIUM: CPT

## 2023-11-13 PROCEDURE — 97161 PT EVAL LOW COMPLEX 20 MIN: CPT

## 2023-11-13 PROCEDURE — S2900: CPT

## 2023-11-13 PROCEDURE — 88307 TISSUE EXAM BY PATHOLOGIST: CPT

## 2023-11-13 PROCEDURE — 85730 THROMBOPLASTIN TIME PARTIAL: CPT

## 2023-11-13 PROCEDURE — 88341 IMHCHEM/IMCYTCHM EA ADD ANTB: CPT

## 2023-11-13 PROCEDURE — 86923 COMPATIBILITY TEST ELECTRIC: CPT

## 2023-11-13 PROCEDURE — 85018 HEMOGLOBIN: CPT

## 2023-11-13 PROCEDURE — 80076 HEPATIC FUNCTION PANEL: CPT

## 2023-11-13 PROCEDURE — 80053 COMPREHEN METABOLIC PANEL: CPT

## 2023-11-13 PROCEDURE — 88309 TISSUE EXAM BY PATHOLOGIST: CPT

## 2023-11-13 PROCEDURE — 88360 TUMOR IMMUNOHISTOCHEM/MANUAL: CPT

## 2023-11-13 PROCEDURE — 86901 BLOOD TYPING SEROLOGIC RH(D): CPT

## 2023-11-13 PROCEDURE — P9016: CPT

## 2023-11-13 PROCEDURE — 85610 PROTHROMBIN TIME: CPT

## 2023-11-13 PROCEDURE — 36430 TRANSFUSION BLD/BLD COMPNT: CPT

## 2023-11-13 PROCEDURE — 80048 BASIC METABOLIC PNL TOTAL CA: CPT

## 2023-11-13 PROCEDURE — 84100 ASSAY OF PHOSPHORUS: CPT

## 2023-11-13 PROCEDURE — C1889: CPT

## 2023-11-13 PROCEDURE — P9045: CPT

## 2023-11-13 PROCEDURE — 82330 ASSAY OF CALCIUM: CPT

## 2023-11-13 PROCEDURE — 84295 ASSAY OF SERUM SODIUM: CPT

## 2023-11-13 PROCEDURE — 88305 TISSUE EXAM BY PATHOLOGIST: CPT

## 2023-11-13 PROCEDURE — 88304 TISSUE EXAM BY PATHOLOGIST: CPT

## 2023-11-13 PROCEDURE — 85027 COMPLETE CBC AUTOMATED: CPT

## 2023-11-13 PROCEDURE — 86900 BLOOD TYPING SEROLOGIC ABO: CPT

## 2023-11-13 PROCEDURE — 36415 COLL VENOUS BLD VENIPUNCTURE: CPT

## 2023-11-13 PROCEDURE — 82947 ASSAY GLUCOSE BLOOD QUANT: CPT

## 2023-11-13 RX ORDER — TRAMADOL HYDROCHLORIDE 50 MG/1
0.5 TABLET ORAL
Qty: 30 | Refills: 0
Start: 2023-11-13

## 2023-11-13 RX ORDER — ACETAMINOPHEN 500 MG
1000 TABLET ORAL ONCE
Refills: 0 | Status: COMPLETED | OUTPATIENT
Start: 2023-11-13 | End: 2023-11-13

## 2023-11-13 RX ORDER — MAGNESIUM SULFATE 500 MG/ML
1 VIAL (ML) INJECTION ONCE
Refills: 0 | Status: COMPLETED | OUTPATIENT
Start: 2023-11-13 | End: 2023-11-13

## 2023-11-13 RX ORDER — DOCUSATE SODIUM 100 MG
1 CAPSULE ORAL
Qty: 14 | Refills: 0
Start: 2023-11-13 | End: 2023-11-19

## 2023-11-13 RX ADMIN — Medication 1 APPLICATION(S): at 09:35

## 2023-11-13 RX ADMIN — PANTOPRAZOLE SODIUM 40 MILLIGRAM(S): 20 TABLET, DELAYED RELEASE ORAL at 06:19

## 2023-11-13 RX ADMIN — TRAMADOL HYDROCHLORIDE 25 MILLIGRAM(S): 50 TABLET ORAL at 06:19

## 2023-11-13 RX ADMIN — Medication 1000 MILLIGRAM(S): at 07:09

## 2023-11-13 RX ADMIN — Medication 100 GRAM(S): at 09:23

## 2023-11-13 NOTE — PROGRESS NOTE ADULT - ASSESSMENT
58F POD4 s/p RA TLH RSO, sigmoidoscopy by GYN team and cholecystectomy and liver resection from the right lobe by surgery team     Neuro: Tramadol 25mg q6h, Hydrocortisone Cream PRN   CV: VSS    Resp: ORA  GI: Regular diet s/p cholecystectomy and partial liver resection (11/9) Zofran PRN, Protonix QD, Dulcolax QHS   GYN: s/p RA TLH RSO sigmoidoscopy (11/9)   : voiding   Heme: Thrombocytopenia improving (101>142)  ID: Afebrile, s/p Ancef q8 x 3 doses (11/9-11/10)   DVT PPx: SCDs, SQH     Care per primary team. GYN following

## 2023-11-13 NOTE — DISCHARGE NOTE PROVIDER - NSDCFUSCHEDAPPT_GEN_ALL_CORE_FT
[facility]  GYNONC [address]  Scheduled Appointment: 11/16/2023    Pedro Jimenez  [facility]  SURGONC [address]  Scheduled Appointment: 11/21/2023    Ciara Arenas  [facility]  GYNONC [address]  Scheduled Appointment: 12/06/2023     Ciara Arenas  [facility]  GYNONC [address]  Scheduled Appointment: 12/13/2023    Pedro Jimenez  [facility]  SURGONC [address]  Scheduled Appointment: 02/27/2024

## 2023-11-13 NOTE — DISCHARGE NOTE PROVIDER - NSDCCPCAREPLAN_GEN_ALL_CORE_FT
PRINCIPAL DISCHARGE DIAGNOSIS  Diagnosis: Liver lesion  Assessment and Plan of Treatment: Follow up with Dr. Jimenez in 1-2 weeks. Call the office at the number below to schedule your appointment. You may shower; soap and water over incision sites. Do not scrub. Pat dry when done. No tub bathing or swimming until cleared. Keep incision sites out of the sun as scars will darken. Ambulate as tolerated, but no heavy lifting (>10lbs) or strenuous exercise. You should be on low fat diet. You should be urinating at least 3-4x per day. Call the office if you experience increasing abdominal pain, nausea, vomiting, or temperature >101 F.

## 2023-11-13 NOTE — DISCHARGE NOTE NURSING/CASE MANAGEMENT/SOCIAL WORK - PATIENT PORTAL LINK FT
You can access the FollowMyHealth Patient Portal offered by [facility] by registering at the following website: [email or web address]. By joining [employer]’s FollowMyHealth portal, you will also be able to view your health information using other applications (apps) compatible with our system.

## 2023-11-13 NOTE — PROGRESS NOTE ADULT - SUBJECTIVE AND OBJECTIVE BOX
INTERVAL HPI/OVERNIGHT EVENTS:    STATUS POST:      POST OPERATIVE DAY #:     SUBJECTIVE:          Vital Signs Last 24 Hrs  T(C): 36.8 (11 Nov 2023 05:20), Max: 37.3 (10 Nov 2023 18:38)  T(F): 98.3 (11 Nov 2023 05:20), Max: 99.2 (10 Nov 2023 18:38)  HR: 86 (11 Nov 2023 07:56) (84 - 111)  BP: 114/59 (11 Nov 2023 07:56) (87/50 - 114/59)  BP(mean): 82 (11 Nov 2023 07:56) (68 - 82)  RR: 17 (11 Nov 2023 07:56) (16 - 17)  SpO2: 93% (11 Nov 2023 07:56) (90% - 97%)    Parameters below as of 11 Nov 2023 07:56  Patient On (Oxygen Delivery Method): nasal cannula  O2 Flow (L/min): 2    I&O's Detail    10 Nov 2023 07:01  -  11 Nov 2023 07:00  --------------------------------------------------------  IN:    Lactated Ringers: 960 mL    Oral Fluid: 620 mL    PRBCs (Packed Red Blood Cells): 300 mL  Total IN: 1880 mL    OUT:    Indwelling Catheter - Urethral (mL): 700 mL    Voided (mL): 600 mL  Total OUT: 1300 mL    Total NET: 580 mL      11 Nov 2023 07:01  -  11 Nov 2023 08:07  --------------------------------------------------------  IN:    Lactated Ringers: 80 mL    Oral Fluid: 100 mL  Total IN: 180 mL    OUT:    Voided (mL): 300 mL  Total OUT: 300 mL    Total NET: -120 mL          General: NAD, resting comfortably in bed  C/V: NSR  Pulm: Nonlabored breathing, no respiratory distress  Abd: soft, NT/ND.  Extrem: WWP, no edema, SCDs in place  Drains:  Ryder:      LABS:                        9.3    11.69 )-----------( 101      ( 11 Nov 2023 05:30 )             29.6     11-11    141  |  106  |  10  ----------------------------<  93  4.5   |  29  |  0.68    Ca    8.6      11 Nov 2023 05:30  Phos  2.0     11-10  Mg     2.0     11-11    TPro  5.0<L>  /  Alb  3.3  /  TBili  0.6  /  DBili  x   /  AST  235<H>  /  ALT  234<H>  /  AlkPhos  67  11-10    PT/INR - ( 11 Nov 2023 05:30 )   PT: 11.7 sec;   INR: 1.03          PTT - ( 11 Nov 2023 05:30 )  PTT:26.5 sec  Urinalysis Basic - ( 11 Nov 2023 05:30 )    Color: x / Appearance: x / SG: x / pH: x  Gluc: 93 mg/dL / Ketone: x  / Bili: x / Urobili: x   Blood: x / Protein: x / Nitrite: x   Leuk Esterase: x / RBC: x / WBC x   Sq Epi: x / Non Sq Epi: x / Bacteria: x        RADIOLOGY & ADDITIONAL STUDIES:  
Pt seen and examined at bedside. Pt states mild abdominal pain. Pt [not yet] ambulating, tolerating CL diet, [no] flatus,  [x] urinating adequately.   Pt denies fever, chills, chest pain, SOB, nausea, vomiting, lightheadedness, dizziness.      T(F): 98.9 (11-10-23 @ 04:50), Max: 98.9 (11-10-23 @ 04:50)  HR: 116 (11-10-23 @ 04:05) (90 - 116)  BP: 94/55 (11-10-23 @ 04:05) (85/54 - 113/78)  RR: 17 (11-10-23 @ 04:05) (12 - 25)  SpO2: 94% (11-10-23 @ 04:05) (94% - 99%)  Wt(kg): --  I&O's Summary    09 Nov 2023 07:01  -  10 Nov 2023 06:58  --------------------------------------------------------  IN: 2680 mL / OUT: 2240 mL / NET: 440 mL        MEDICATIONS  (STANDING):  ceFAZolin   IVPB 2000 milliGRAM(s) IV Intermittent every 8 hours  HYDROmorphone PCA (1 mG/mL) 30 milliLiter(s) PCA Continuous PCA Continuous  influenza   Vaccine 0.5 milliLiter(s) IntraMuscular once  ketorolac   Injectable 15 milliGRAM(s) IV Push every 8 hours  lactated ringers. 1000 milliLiter(s) (100 mL/Hr) IV Continuous <Continuous>  pantoprazole    Tablet 40 milliGRAM(s) Oral before breakfast    MEDICATIONS  (PRN):  naloxone Injectable 0.1 milliGRAM(s) IV Push every 3 minutes PRN For ANY of the following changes in patient status:  A. RR LESS THAN 10 breaths per minute, B. Oxygen saturation LESS THAN 90%, C. Sedation score of 6  ondansetron Injectable 4 milliGRAM(s) IV Push every 6 hours PRN Nausea      Physical Exam:  Constitutional: NAD  Pulmonary: no increased work of breathing  Cardiovascular: Regular rate and rhythm   Abdomen: incision sites clean, dry, intact. Soft, mildly tender, [moderately] distended, no guarding, no rebound, [hypoactive] bowel sounds  Extremities: no lower extremity edema or calf tenderness. SCDs in place     LABS:                        8.6    9.90  )-----------( 143      ( 10 Nov 2023 06:30 )             26.7     11-09    141  |  109<H>  |  13  ----------------------------<  197<H>  3.8   |  20<L>  |  0.62    Ca    8.1<L>      09 Nov 2023 18:18  Phos  3.0     11-09  Mg     1.5     11-09    TPro  5.3<L>  /  Alb  3.6  /  TBili  1.0  /  DBili  x   /  AST  309<H>  /  ALT  268<H>  /  AlkPhos  72  11-09      Urinalysis Basic - ( 09 Nov 2023 18:18 )    Color: x / Appearance: x / SG: x / pH: x  Gluc: 197 mg/dL / Ketone: x  / Bili: x / Urobili: x   Blood: x / Protein: x / Nitrite: x   Leuk Esterase: x / RBC: x / WBC x   Sq Epi: x / Non Sq Epi: x / Bacteria: x        RADIOLOGY & ADDITIONAL TESTS:    
Pt seen and examined at bedside. Pt states mild abdominal pain. Pt is ambulating, tolerating clear liquid diet, passing flatus, urinating adequately. She reports a headache this morning, requesting Tylenol.  Pt denies fever, chills, chest pain, SOB, nausea, vomiting, lightheadedness, dizziness.      T(F): 98.5 (11-12-23 @ 05:01), Max: 98.5 (11-12-23 @ 05:01)  HR: 92 (11-12-23 @ 05:17) (86 - 99)  BP: 122/72 (11-12-23 @ 05:17) (108/63 - 132/61)  RR: 17 (11-12-23 @ 05:17) (17 - 20)  SpO2: 92% (11-12-23 @ 05:17) (92% - 98%)  I&O's Summary    11 Nov 2023 07:01  -  12 Nov 2023 07:00  --------------------------------------------------------  IN: 2130 mL / OUT: 2700 mL / NET: -570 mL        MEDICATIONS  (STANDING):  benzocaine/menthol Lozenge 1 Lozenge Oral two times a day  dextrose 5% + sodium chloride 0.45%. 1000 milliLiter(s) (40 mL/Hr) IV Continuous <Continuous>  heparin   Injectable 5000 Unit(s) SubCutaneous every 12 hours  HYDROmorphone PCA (1 mG/mL) 30 milliLiter(s) PCA Continuous PCA Continuous  influenza   Vaccine 0.5 milliLiter(s) IntraMuscular once  pantoprazole    Tablet 40 milliGRAM(s) Oral before breakfast    MEDICATIONS  (PRN):  diphenhydrAMINE 25 milliGRAM(s) Oral every 6 hours PRN Rash and/or Itching  naloxone Injectable 0.1 milliGRAM(s) IV Push every 3 minutes PRN For ANY of the following changes in patient status:  A. RR LESS THAN 10 breaths per minute, B. Oxygen saturation LESS THAN 90%, C. Sedation score of 6  ondansetron Injectable 4 milliGRAM(s) IV Push every 6 hours PRN Nausea      Physical Exam:  Constitutional: NAD  Pulmonary: no increased work of breathing  Cardiovascular: Regular rate and rhythm   Abdomen: incision sites clean, dry, intact. Soft, mildly tender, nondistended, no guarding, no rebound, +bowel sounds  Extremities: no lower extremity edema or calf tenderness. SCDs in place     LABS:                        10.5   10.97 )-----------( 142      ( 12 Nov 2023 06:43 )             33.3     11-12    141  |  106  |  5<L>  ----------------------------<  108<H>  3.5   |  27  |  0.57    Ca    8.7      12 Nov 2023 06:43  Phos  3.0     11-12  Mg     1.7     11-12    TPro  5.0<L>  /  Alb  3.2<L>  /  TBili  0.9  /  DBili  0.3  /  AST  188<H>  /  ALT  235<H>  /  AlkPhos  73  11-11    PT/INR - ( 11 Nov 2023 05:30 )   PT: 11.7 sec;   INR: 1.03          PTT - ( 11 Nov 2023 05:30 )  PTT:26.5 sec  Urinalysis Basic - ( 12 Nov 2023 06:43 )    Color: x / Appearance: x / SG: x / pH: x  Gluc: 108 mg/dL / Ketone: x  / Bili: x / Urobili: x   Blood: x / Protein: x / Nitrite: x   Leuk Esterase: x / RBC: x / WBC x   Sq Epi: x / Non Sq Epi: x / Bacteria: x    
STATUS POST:  Hysterectomy, total, laparoscopic, for uterus 250 g or less, Right salpingoophorectomy, Extended resection of liver      POST OPERATIVE DAY #: 1    SUBJECTIVE: Patient was seen and examined by chief resident. Patient resting comfortably in bed with no acute complaints. Minimal PO intake but no NV. -F/-BM. Pain is well controlled. Denies dizziness, CP, SOB.      Vital Signs Last 24 Hrs  T(C): 37.2 (10 Nov 2023 04:50), Max: 37.2 (10 Nov 2023 04:50)  T(F): 98.9 (10 Nov 2023 04:50), Max: 98.9 (10 Nov 2023 04:50)  HR: 116 (10 Nov 2023 04:05) (90 - 116)  BP: 94/55 (10 Nov 2023 04:05) (85/54 - 113/78)  BP(mean): 68 (10 Nov 2023 04:05) (63 - 75)  RR: 17 (10 Nov 2023 04:05) (12 - 25)  SpO2: 94% (10 Nov 2023 04:05) (94% - 99%)    Parameters below as of 10 Nov 2023 04:05  Patient On (Oxygen Delivery Method): room air        I&O's Summary    09 Nov 2023 07:01  -  10 Nov 2023 07:00  --------------------------------------------------------  IN: 2680 mL / OUT: 2240 mL / NET: 440 mL        Physical Exam:  General Appearance: Appears well, NAD  Pulmonary: Nonlabored breathing, no respiratory distress  Cardiovascular: sinus tachycardia  Abdomen: Soft, ND, appropriate incisional tenderness, R lateral incision w island dress w strikethrough, port site incisions CDI  Extremities: WWP, SCD's in place     LABS:                        8.6    9.90  )-----------( 143      ( 10 Nov 2023 06:30 )             26.7     11-09    141  |  109<H>  |  13  ----------------------------<  197<H>  3.8   |  20<L>  |  0.62    Ca    8.1<L>      09 Nov 2023 18:18  Phos  3.0     11-09  Mg     1.5     11-09    TPro  5.3<L>  /  Alb  3.6  /  TBili  1.0  /  DBili  x   /  AST  309<H>  /  ALT  268<H>  /  AlkPhos  72  11-09      Urinalysis Basic - ( 09 Nov 2023 18:18 )    Color: x / Appearance: x / SG: x / pH: x  Gluc: 197 mg/dL / Ketone: x  / Bili: x / Urobili: x   Blood: x / Protein: x / Nitrite: x   Leuk Esterase: x / RBC: x / WBC x   Sq Epi: x / Non Sq Epi: x / Bacteria: x      
Pt seen and examined at bedside. Pt states mild RUQ pain. Tolerating CLD without n/v. Not yet passed flatus. Not yet OOB. Purewick catheter in place. Patient put on O2 overnight (2L) but denies CP/SOB.    T(F): 98.3 (11-11-23 @ 05:20), Max: 99.2 (11-10-23 @ 18:38)  HR: 86 (11-11-23 @ 07:56) (84 - 111)  BP: 114/59 (11-11-23 @ 07:56) (87/50 - 114/59)  RR: 17 (11-11-23 @ 07:56) (16 - 17)  SpO2: 93% (11-11-23 @ 07:56) (90% - 97%)  Wt(kg): --  I&O's Summary    10 Nov 2023 07:01  -  11 Nov 2023 07:00  --------------------------------------------------------  IN: 1880 mL / OUT: 1300 mL / NET: 580 mL    11 Nov 2023 07:01  -  11 Nov 2023 08:12  --------------------------------------------------------  IN: 180 mL / OUT: 300 mL / NET: -120 mL        MEDICATIONS  (STANDING):  acetaminophen   Oral Liquid .. 650 milliGRAM(s) Oral once  benzocaine/menthol Lozenge 1 Lozenge Oral two times a day  HYDROmorphone PCA (1 mG/mL) 30 milliLiter(s) PCA Continuous PCA Continuous  influenza   Vaccine 0.5 milliLiter(s) IntraMuscular once  lactated ringers. 1000 milliLiter(s) (80 mL/Hr) IV Continuous <Continuous>  pantoprazole    Tablet 40 milliGRAM(s) Oral before breakfast    MEDICATIONS  (PRN):  diphenhydrAMINE 25 milliGRAM(s) Oral every 6 hours PRN Rash and/or Itching  naloxone Injectable 0.1 milliGRAM(s) IV Push every 3 minutes PRN For ANY of the following changes in patient status:  A. RR LESS THAN 10 breaths per minute, B. Oxygen saturation LESS THAN 90%, C. Sedation score of 6  ondansetron Injectable 4 milliGRAM(s) IV Push every 6 hours PRN Nausea      Physical Exam:  Constitutional: NAD  Pulmonary: no increased work of breathing  Abdomen: Incisions clean/dry/intact. Appropriately tender, mildly distended likely from gas. No rebound/guarding. Hypoactive bowel sounds  Ext: No calf tenderness or edema    LABS:                        9.3    11.69 )-----------( 101      ( 11 Nov 2023 05:30 )             29.6     11-11    141  |  106  |  10  ----------------------------<  93  4.5   |  29  |  0.68    Ca    8.6      11 Nov 2023 05:30  Phos  2.0     11-10  Mg     2.0     11-11    TPro  5.0<L>  /  Alb  3.3  /  TBili  0.6  /  DBili  x   /  AST  235<H>  /  ALT  234<H>  /  AlkPhos  67  11-10    PT/INR - ( 11 Nov 2023 05:30 )   PT: 11.7 sec;   INR: 1.03          PTT - ( 11 Nov 2023 05:30 )  PTT:26.5 sec  Urinalysis Basic - ( 11 Nov 2023 05:30 )    Color: x / Appearance: x / SG: x / pH: x  Gluc: 93 mg/dL / Ketone: x  / Bili: x / Urobili: x   Blood: x / Protein: x / Nitrite: x   Leuk Esterase: x / RBC: x / WBC x   Sq Epi: x / Non Sq Epi: x / Bacteria: x        
Procedure:  ANTONIO/RSO and liver resection (11/9).  Surgeon: Dr. Jimenez    S: Pt has no complaints. .Pain controlled with PCA. SBP soft, asym. Only sips of CLD in pacu, tolerating. Denies CP, SOB, TOMAS, calf tenderness.    O:  T(C): 36.2 (11-09-23 @ 18:05), Max: 36.2 (11-09-23 @ 18:05)  T(F): 97.1 (11-09-23 @ 18:05), Max: 97.1 (11-09-23 @ 18:05)  HR: 96 (11-09-23 @ 20:02) (96 - 107)  BP: 85/54 (11-09-23 @ 19:32) (85/54 - 101/55)  RR: 25 (11-09-23 @ 20:02) (12 - 25)  SpO2: 98% (11-09-23 @ 20:02) (95% - 98%)  Wt(kg): --                        9.5    9.41  )-----------( 141      ( 09 Nov 2023 18:18 )             28.8     11-09    141  |  109<H>  |  13  ----------------------------<  197<H>  3.8   |  20<L>  |  0.62    Ca    8.1<L>      09 Nov 2023 18:18  Phos  3.0     11-09  Mg     1.5     11-09    TPro  5.3<L>  /  Alb  3.6  /  TBili  1.0  /  DBili  x   /  AST  309<H>  /  ALT  268<H>  /  AlkPhos  72  11-09      Gen: NAD, resting comfortably in bed  C/V: NSR  Pulm: Nonlabored breathing, no respiratory distress, ANC  Abd: soft,appropriately ttp near incisions./ND. No rebound or guarding.   \Incision: c/d/i  Ryder; yellow urine  Extrem: WWP, no calf edema, SCDs in place      
Pt seen and examined at bedside. Pt complains of mild abdominal pain.   Pt denies any fever, chills, chest pain, SOB, nausea or vomiting     T(F): 97.1 (11-09-23 @ 20:55), Max: 97.1 (11-09-23 @ 18:05)  HR: 94 (11-09-23 @ 20:55) (90 - 107)  BP: 90/51 (11-09-23 @ 20:55) (85/54 - 113/78)  RR: 16 (11-09-23 @ 20:55) (12 - 25)  SpO2: 99% (11-09-23 @ 20:55) (95% - 99%)  Wt(kg): --    11-09 @ 07:01  -  11-09 @ 22:09  --------------------------------------------------------  IN: 780 mL / OUT: 1290 mL / NET: -510 mL        ceFAZolin   IVPB 2000 milliGRAM(s) IV Intermittent every 8 hours  HYDROmorphone PCA (1 mG/mL) 30 milliLiter(s) PCA Continuous PCA Continuous  influenza   Vaccine 0.5 milliLiter(s) IntraMuscular once  ketorolac   Injectable 15 milliGRAM(s) IV Push every 8 hours  lactated ringers. 1000 milliLiter(s) IV Continuous <Continuous>  naloxone Injectable 0.1 milliGRAM(s) IV Push every 3 minutes PRN For ANY of the following changes in patient status:  A. RR LESS THAN 10 breaths per minute, B. Oxygen saturation LESS THAN 90%, C. Sedation score of 6  ondansetron Injectable 4 milliGRAM(s) IV Push every 6 hours PRN Nausea  pantoprazole    Tablet 40 milliGRAM(s) Oral before breakfast  potassium chloride  20 mEq/100 mL IVPB 20 milliEquivalent(s) IV Intermittent once      Physical exam:  Constitutional: NAD  Abdomen: incision site clean, dry and intact. Soft, mildly tender, nondistended  Extremities: no lower extremity edema, or calve tenderness. SCDs in place  
STATUS POST:  Hysterectomy, total, laparoscopic, for uterus 250 g or less, Right salpingoophorectomy, Extended resection of liver      POST OPERATIVE DAY #: 3    SUBJECTIVE: Pt seen and examined at bedside this am by surgery team. Tolerating clear liquid, diet, passing gas.     MEDICATIONS  (STANDING):  benzocaine/menthol Lozenge 1 Lozenge Oral two times a day  bisacodyl 5 milliGRAM(s) Oral at bedtime  heparin   Injectable 5000 Unit(s) SubCutaneous every 12 hours  influenza   Vaccine 0.5 milliLiter(s) IntraMuscular once  pantoprazole    Tablet 40 milliGRAM(s) Oral before breakfast  traMADol 25 milliGRAM(s) Oral every 6 hours    MEDICATIONS  (PRN):  diphenhydrAMINE 25 milliGRAM(s) Oral every 6 hours PRN Rash and/or Itching  ondansetron Injectable 4 milliGRAM(s) IV Push every 6 hours PRN Nausea      Vital Signs Last 24 Hrs  T(C): 37.1 (12 Nov 2023 09:18), Max: 37.1 (12 Nov 2023 09:18)  T(F): 98.8 (12 Nov 2023 09:18), Max: 98.8 (12 Nov 2023 09:18)  HR: 88 (12 Nov 2023 11:25) (88 - 99)  BP: 112/61 (12 Nov 2023 11:25) (108/63 - 126/59)  BP(mean): 80 (12 Nov 2023 11:25) (78 - 92)  RR: 18 (12 Nov 2023 11:25) (17 - 20)  SpO2: 94% (12 Nov 2023 11:25) (92% - 96%)    Parameters below as of 12 Nov 2023 11:25  Patient On (Oxygen Delivery Method): room air        Physical Exam:  General Appearance: Appears well, NAD  Pulmonary: Nonlabored breathing, no respiratory distress  Cardiovascular: sinus tachycardia  Abdomen: Soft, ND, appropriate incisional tenderness, R lateral incision w island dress w strikethrough, port site incisions CDI  Extremities: WWP, SCD's in place       I&O's Detail    11 Nov 2023 07:01  -  12 Nov 2023 07:00  --------------------------------------------------------  IN:    dextrose 5% + sodium chloride 0.45%: 840 mL    IV PiggyBack: 350 mL    Lactated Ringers: 240 mL    Oral Fluid: 700 mL  Total IN: 2130 mL    OUT:    Voided (mL): 2700 mL  Total OUT: 2700 mL    Total NET: -570 mL      12 Nov 2023 07:01  -  12 Nov 2023 12:10  --------------------------------------------------------  IN:    Oral Fluid: 450 mL  Total IN: 450 mL    OUT:    Voided (mL): 700 mL  Total OUT: 700 mL    Total NET: -250 mL          LABS:                        10.5   10.97 )-----------( 142      ( 12 Nov 2023 06:43 )             33.3     11-12    141  |  106  |  5<L>  ----------------------------<  108<H>  3.5   |  27  |  0.57    Ca    8.7      12 Nov 2023 06:43  Phos  3.0     11-12  Mg     1.7     11-12    TPro  5.0<L>  /  Alb  3.2<L>  /  TBili  0.9  /  DBili  0.3  /  AST  188<H>  /  ALT  235<H>  /  AlkPhos  73  11-11    PT/INR - ( 11 Nov 2023 05:30 )   PT: 11.7 sec;   INR: 1.03          PTT - ( 11 Nov 2023 05:30 )  PTT:26.5 sec  Urinalysis Basic - ( 12 Nov 2023 06:43 )    Color: x / Appearance: x / SG: x / pH: x  Gluc: 108 mg/dL / Ketone: x  / Bili: x / Urobili: x   Blood: x / Protein: x / Nitrite: x   Leuk Esterase: x / RBC: x / WBC x   Sq Epi: x / Non Sq Epi: x / Bacteria: x      
SUBJECTIVE: Pt seen and examined at bedside with chief. Pt denies any complaints. Pain well controlled. Tolerating diet without N/V. Admits to flatus but no BMs.    MEDICATIONS  (STANDING):  benzocaine/menthol Lozenge 1 Lozenge Oral two times a day  bisacodyl 5 milliGRAM(s) Oral at bedtime  heparin   Injectable 5000 Unit(s) SubCutaneous every 12 hours  hydrocortisone 1% Cream 1 Application(s) Topical once  influenza   Vaccine 0.5 milliLiter(s) IntraMuscular once  pantoprazole    Tablet 40 milliGRAM(s) Oral before breakfast  traMADol 25 milliGRAM(s) Oral every 6 hours    MEDICATIONS  (PRN):  diphenhydrAMINE 25 milliGRAM(s) Oral every 6 hours PRN Rash and/or Itching  ondansetron Injectable 4 milliGRAM(s) IV Push every 6 hours PRN Nausea      Vital Signs Last 24 Hrs  T(C): 36.5 (13 Nov 2023 05:37), Max: 37.1 (12 Nov 2023 09:18)  T(F): 97.7 (13 Nov 2023 05:37), Max: 98.8 (12 Nov 2023 09:18)  HR: 87 (13 Nov 2023 05:37) (87 - 103)  BP: 121/75 (13 Nov 2023 05:37) (98/66 - 126/59)  BP(mean): 80 (12 Nov 2023 11:25) (80 - 85)  RR: 17 (13 Nov 2023 05:37) (17 - 18)  SpO2: 97% (13 Nov 2023 05:37) (93% - 97%)    Parameters below as of 13 Nov 2023 05:37  Patient On (Oxygen Delivery Method): room air        PHYSICAL EXAM:      Constitutional: A&Ox3    Respiratory: non labored breathing, no respiratory distress    Cardiovascular: NSR, RRR    Gastrointestinal: Soft ND, NT                 Incision: CDI    Genitourinary: Voiding     Extremities: (-) edema                  I&O's Detail    12 Nov 2023 07:01  -  13 Nov 2023 07:00  --------------------------------------------------------  IN:    IV PiggyBack: 100 mL    Oral Fluid: 450 mL  Total IN: 550 mL    OUT:    Voided (mL): 2100 mL  Total OUT: 2100 mL    Total NET: -1550 mL          LABS:                        9.5    9.26  )-----------( 151      ( 13 Nov 2023 05:30 )             29.1     11-13    140  |  106  |  x   ----------------------------<  102<H>  3.8   |  25  |  0.60    Ca    8.7      13 Nov 2023 05:30  Phos  3.0     11-13  Mg     1.8     11-13        Urinalysis Basic - ( 13 Nov 2023 05:30 )    Color: x / Appearance: x / SG: x / pH: x  Gluc: 102 mg/dL / Ketone: x  / Bili: x / Urobili: x   Blood: x / Protein: x / Nitrite: x   Leuk Esterase: x / RBC: x / WBC x   Sq Epi: x / Non Sq Epi: x / Bacteria: x        RADIOLOGY & ADDITIONAL STUDIES:
Pt seen and examined at bedside. Pt states mild abdominal pain. Pt [x] ambulating, tolerating reg diet, [x] flatus,  [x] urinating adequately.   Pt denies fever, chills, chest pain, SOB, nausea, vomiting, lightheadedness, dizziness.      T(F): 97.7 (11-13-23 @ 05:37), Max: 98.8 (11-12-23 @ 09:18)  HR: 87 (11-13-23 @ 05:37) (87 - 103)  BP: 121/75 (11-13-23 @ 05:37) (98/66 - 126/59)  RR: 17 (11-13-23 @ 05:37) (17 - 18)  SpO2: 97% (11-13-23 @ 05:37) (93% - 97%)  Wt(kg): --  I&O's Summary    11 Nov 2023 07:01  -  12 Nov 2023 07:00  --------------------------------------------------------  IN: 2130 mL / OUT: 2700 mL / NET: -570 mL    12 Nov 2023 07:01  -  13 Nov 2023 06:30  --------------------------------------------------------  IN: 550 mL / OUT: 2100 mL / NET: -1550 mL        MEDICATIONS  (STANDING):  benzocaine/menthol Lozenge 1 Lozenge Oral two times a day  bisacodyl 5 milliGRAM(s) Oral at bedtime  heparin   Injectable 5000 Unit(s) SubCutaneous every 12 hours  hydrocortisone 1% Cream 1 Application(s) Topical once  influenza   Vaccine 0.5 milliLiter(s) IntraMuscular once  pantoprazole    Tablet 40 milliGRAM(s) Oral before breakfast  traMADol 25 milliGRAM(s) Oral every 6 hours    MEDICATIONS  (PRN):  diphenhydrAMINE 25 milliGRAM(s) Oral every 6 hours PRN Rash and/or Itching  ondansetron Injectable 4 milliGRAM(s) IV Push every 6 hours PRN Nausea      Physical Exam:  Constitutional: NAD  Pulmonary: no increased work of breathing  Cardiovascular: Regular rate and rhythm   Abdomen: incision sites clean, dry, intact. Soft, mildly tender, [non] distended, no guarding, no rebound, [+] bowel sounds  Extremities: no lower extremity edema or calf tenderness. SCDs in place     LABS:                        10.5   10.97 )-----------( 142      ( 12 Nov 2023 06:43 )             33.3     11-12    141  |  106  |  5<L>  ----------------------------<  108<H>  3.5   |  27  |  0.57    Ca    8.7      12 Nov 2023 06:43  Phos  3.0     11-12  Mg     1.7     11-12        Urinalysis Basic - ( 12 Nov 2023 06:43 )    Color: x / Appearance: x / SG: x / pH: x  Gluc: 108 mg/dL / Ketone: x  / Bili: x / Urobili: x   Blood: x / Protein: x / Nitrite: x   Leuk Esterase: x / RBC: x / WBC x   Sq Epi: x / Non Sq Epi: x / Bacteria: x        RADIOLOGY & ADDITIONAL TESTS:

## 2023-11-13 NOTE — CHART NOTE - NSCHARTNOTEFT_GEN_A_CORE
GYN POC   Patient seen at bedside. Pain controlled. Tolerating sips.  No OOB yet. Denies CP SOB.    Vital Signs Last 24 Hrs  T(C): 36.7 (13 Nov 2023 08:17), Max: 36.8 (12 Nov 2023 16:49)  T(F): 98.1 (13 Nov 2023 08:17), Max: 98.3 (12 Nov 2023 16:49)  HR: 88 (13 Nov 2023 08:17) (87 - 103)  BP: 105/72 (13 Nov 2023 08:17) (98/66 - 121/75)  BP(mean): 80 (12 Nov 2023 11:25) (80 - 80)  RR: 16 (13 Nov 2023 08:17) (16 - 18)  SpO2: 94% (13 Nov 2023 08:17) (94% - 97%)    Parameters below as of 13 Nov 2023 08:17  Patient On (Oxygen Delivery Method): room air        Physical Exam:  Gen: No Acute Distress  Pulm: Breathing comfortably  Ext: SCDs in place, [facility]    I&O's Summary    12 Nov 2023 07:01  -  13 Nov 2023 07:00  --------------------------------------------------------  IN: 550 mL / OUT: 2100 mL / NET: -1550 mL    13 Nov 2023 07:01  -  13 Nov 2023 11:02  --------------------------------------------------------  IN: 0 mL / OUT: 300 mL / NET: -300 mL      MEDICATIONS  (STANDING):  benzocaine/menthol Lozenge 1 Lozenge Oral two times a day  bisacodyl 5 milliGRAM(s) Oral at bedtime  heparin   Injectable 5000 Unit(s) SubCutaneous every 12 hours  influenza   Vaccine 0.5 milliLiter(s) IntraMuscular once  pantoprazole    Tablet 40 milliGRAM(s) Oral before breakfast  traMADol 25 milliGRAM(s) Oral every 6 hours    MEDICATIONS  (PRN):  diphenhydrAMINE 25 milliGRAM(s) Oral every 6 hours PRN Rash and/or Itching  ondansetron Injectable 4 milliGRAM(s) IV Push every 6 hours PRN Nausea    Allergies    adhesives (Rash)  No Known Drug Allergies    Intolerances    OxyContin (Stomach Upset)      LABS:                        9.5    9.26  )-----------( 151      ( 13 Nov 2023 05:30 )             29.1     11-13    140  |  106  |  9   ----------------------------<  102<H>  3.8   |  25  |  0.60    Ca    8.7      13 Nov 2023 05:30  Phos  3.0     11-13  Mg     1.8     11-13        Urinalysis Basic - ( 13 Nov 2023 05:30 )    Color: x / Appearance: x / SG: x / pH: x  Gluc: 102 mg/dL / Ketone: x  / Bili: x / Urobili: x   Blood: x / Protein: x / Nitrite: x   Leuk Esterase: x / RBC: x / WBC x   Sq Epi: x / Non Sq Epi: x / Bacteria: x      S/p posterior repair perineorrhaphy, doing well. Vaginal packing removed. Stable for discharge pending ability to tolerate ambulation.

## 2023-11-13 NOTE — DISCHARGE NOTE PROVIDER - CARE PROVIDER_API CALL
Pedro Jimenez  Surgical Oncology  [address] [address]  Phone: [phone]  Fax: [phone]  Follow Up Time:

## 2023-11-13 NOTE — DISCHARGE NOTE PROVIDER - NSDCMRMEDTOKEN_GEN_ALL_CORE_FT
Colace 100 mg oral capsule: 1 cap(s) orally every 12 hours as needed for  constipation  omeprazole 40 mg oral delayed release capsule: 1 cap(s) orally once a day PRN  traMADol 50 mg oral tablet: 0.5 tab(s) orally every 6 hours as needed for  severe pain MDD: 8 tabs

## 2023-11-13 NOTE — PROGRESS NOTE ADULT - ASSESSMENT
58F with PMH of endometrial cancer and colon cancer metastatic to the liver (separate primary cancers). S/p ANTONIO/RSO and open cholecystectomy and Right liver resection (11/9).    d/c home  Reg diet  SQH BID/SCDs  Pain & nausea prn

## 2023-11-13 NOTE — DISCHARGE NOTE PROVIDER - HOSPITAL COURSE
Ms. Karin Oquendo is a 58F with PMH of both endometrial cancer and colon cancer, both separate primary cancers. Patient previously underwent a left salpingoophrectomy by OBGYN last month for her endometrial cancer, and was also noted to have metastasis of her colon cancer to her liver. For this admission pt presented for a dual OBGYN-surgical oncology procedure for a total abdominal hysterectomy and right salpingoophrectomy with OBGYN as well as partial liver resection to resect her metastatic colon cancer mass in her liver.   On 11/9, pt underwent ANTONIO BSO, open cholecystectomy, and right liver resection. Postop c/b acute anemia due to blood loss intraop. 1 unit of PRBC given with proper hgb correction. Pt tolerated PO intake with return of bowel function, voided adequately and remained hemodynamically stable. At time of discharge pt was stable.    Ms. Karin Oquendo is a 58F with PMH of both endometrial cancer and colon cancer, both separate primary cancers. Patient previously underwent a left salpingoophrectomy by OBGYN last month for her endometrial cancer, and was also noted to have metastasis of her colon cancer to her liver. For this admission pt presented for a dual OBGYN-surgical oncology procedure for a total abdominal hysterectomy and right salpingoophrectomy with OBGYN as well as partial liver resection to resect her metastatic colon cancer mass in her liver.   On 11/9, pt underwent ANTONIO BSO, open cholecystectomy, and right liver resection. Postop c/b acute anemia due to blood loss intraop. 1 unit of PRBC given with proper hgb correction. Pt tolerated PO intake with return of bowel function, voided adequately and remained hemodynamically stable. At time of discharge pt was stable.     Final pathology showed metastatic adenocarcinoma of the colon to the liver margins of resection negative.  There was no residual cancer in the uterus or remaining ovary

## 2023-11-13 NOTE — DISCHARGE NOTE NURSING/CASE MANAGEMENT/SOCIAL WORK - NSDCPEFALRISK_GEN_ALL_CORE
For information on Fall & Injury Prevention, visit: [email or web address] OR  [email or web address] OR  https://www.cdc.gov/steadi/patient.html

## 2023-11-15 DIAGNOSIS — Z88.5 ALLERGY STATUS TO NARCOTIC AGENT: ICD-10-CM

## 2023-11-15 DIAGNOSIS — C78.7 SECONDARY MALIGNANT NEOPLASM OF LIVER AND INTRAHEPATIC BILE DUCT: ICD-10-CM

## 2023-11-15 DIAGNOSIS — E83.42 HYPOMAGNESEMIA: ICD-10-CM

## 2023-11-15 DIAGNOSIS — Z53.31 LAPAROSCOPIC SURGICAL PROCEDURE CONVERTED TO OPEN PROCEDURE: ICD-10-CM

## 2023-11-15 DIAGNOSIS — K66.0 PERITONEAL ADHESIONS (POSTPROCEDURAL) (POSTINFECTION): ICD-10-CM

## 2023-11-15 DIAGNOSIS — C56.2 MALIGNANT NEOPLASM OF LEFT OVARY: ICD-10-CM

## 2023-11-15 DIAGNOSIS — K43.2 INCISIONAL HERNIA WITHOUT OBSTRUCTION OR GANGRENE: ICD-10-CM

## 2023-11-15 DIAGNOSIS — D69.6 THROMBOCYTOPENIA, UNSPECIFIED: ICD-10-CM

## 2023-11-15 DIAGNOSIS — D62 ACUTE POSTHEMORRHAGIC ANEMIA: ICD-10-CM

## 2023-11-15 DIAGNOSIS — C18.9 MALIGNANT NEOPLASM OF COLON, UNSPECIFIED: ICD-10-CM

## 2023-11-16 ENCOUNTER — APPOINTMENT (OUTPATIENT)
Dept: GYNECOLOGIC ONCOLOGY | Facility: CLINIC | Age: 58
End: 2023-11-16
Payer: MEDICAID

## 2023-11-16 VITALS
DIASTOLIC BLOOD PRESSURE: 76 MMHG | TEMPERATURE: 97.6 F | WEIGHT: 175 LBS | OXYGEN SATURATION: 96 % | SYSTOLIC BLOOD PRESSURE: 109 MMHG | HEIGHT: 67 IN | HEART RATE: 134 BPM | BODY MASS INDEX: 27.47 KG/M2

## 2023-11-16 PROCEDURE — 99024 POSTOP FOLLOW-UP VISIT: CPT

## 2023-11-17 PROBLEM — C54.1 MALIGNANT NEOPLASM OF ENDOMETRIUM: Chronic | Status: ACTIVE | Noted: 2023-11-08

## 2023-11-17 PROBLEM — C18.9 MALIGNANT NEOPLASM OF COLON, UNSPECIFIED: Chronic | Status: ACTIVE | Noted: 2022-10-05

## 2023-11-20 LAB
SURGICAL PATHOLOGY STUDY: SIGNIFICANT CHANGE UP

## 2023-11-21 ENCOUNTER — APPOINTMENT (OUTPATIENT)
Dept: SURGICAL ONCOLOGY | Facility: CLINIC | Age: 58
End: 2023-11-21

## 2023-11-28 ENCOUNTER — APPOINTMENT (OUTPATIENT)
Dept: SURGICAL ONCOLOGY | Facility: CLINIC | Age: 58
End: 2023-11-28
Payer: MEDICAID

## 2023-11-28 VITALS
WEIGHT: 176 LBS | SYSTOLIC BLOOD PRESSURE: 106 MMHG | DIASTOLIC BLOOD PRESSURE: 70 MMHG | RESPIRATION RATE: 16 BRPM | OXYGEN SATURATION: 96 % | HEART RATE: 90 BPM | BODY MASS INDEX: 27.62 KG/M2 | HEIGHT: 67 IN

## 2023-11-28 PROCEDURE — 99024 POSTOP FOLLOW-UP VISIT: CPT

## 2023-12-13 ENCOUNTER — APPOINTMENT (OUTPATIENT)
Dept: GYNECOLOGIC ONCOLOGY | Facility: CLINIC | Age: 58
End: 2023-12-13
Payer: MEDICAID

## 2023-12-13 VITALS
OXYGEN SATURATION: 94 % | HEART RATE: 122 BPM | WEIGHT: 176 LBS | DIASTOLIC BLOOD PRESSURE: 75 MMHG | HEIGHT: 67 IN | BODY MASS INDEX: 27.62 KG/M2 | SYSTOLIC BLOOD PRESSURE: 109 MMHG | TEMPERATURE: 97.2 F

## 2023-12-13 PROCEDURE — 99024 POSTOP FOLLOW-UP VISIT: CPT

## 2023-12-13 NOTE — DISCUSSION/SUMMARY
[FreeTextEntry1] : Endometrioid adenocarcinoma arising in endometriosis,stage1 A 11/2023 Extensive family history of ovarian cancer  [] genetic counseling  [] follow up in 3mos

## 2023-12-13 NOTE — HISTORY OF PRESENT ILLNESS
[FreeTextEntry1] : Problem 1) Metastatic Colon cancer to Liver 2) Endometrioid Adenocarcinoma of L ovary, stage 1A  Previous Therapy 1) LSO, omental lesion resection, peritoneal lesion resection 10/18/23   a) Endometrioid adenocarcinoma, moderately differentiated arising in background of endometriosis L ovary - no surface involvement. . Negative L fallopian tube, omentum, peritoneum.  2)s/p RA-TLH, RSO, Ureterolysis, flexible sigmoidoscopy, Extended resection of liver 23  a) metastatic adenocarcinoma of liver c/w colon primary    b) unremarkable uterus, fallopian tube, cervix, ovary and gallbladder  Here for 1 month post op visit and pathology discussion. Pathology c/w stage 1A endometrioid adenocarcinoma of L ovary and metastatic adenoarcinoma to liver.   Fhx: mother ovary at 43yo and  at 58 of MDS related to chemotherapy.  She was treated by Dr. Patten at [facility]. PMHx: colon cancer PSHs: colon resection 2018 and liver surgery 10/2022, lapsy LSO 10/2023 Social hx: lives in [place] alone,  for [employer].  Went to college in [place], did marijuana and gummies while on chemotherapy and does take it for pain. Not sexually active, lots of friends. She has significant financial stress.  She has no family but lots of friends.

## 2024-02-22 NOTE — HISTORY OF PRESENT ILLNESS
[de-identified] : Romy Oquendo MRN: [id number] Date of visit:02/27/2024    Diagnosis: colon cancer, metastatic to liver Operative Date: (1) 7/11/18 (2) 10/6/22 (3) 10/18/23 (4) 11/9/23 Procedure: (1) Robotic sigmoid colon resection (@[facility]) (2) Diagnostic lap, Open partial liver resection, Repair of incisional hernia (3) Diagnostic laparoscopy with oophorectomy (4) Liver resection Pathology: (1) [id number] Final Pathology Showed: Liver - Metastatic adenocarcinoma, consistent with colon primary  She presents for a scheduled follow-up. She is here to review scan   Currently, Ms. OQUENDO  Work Up:

## 2024-02-27 ENCOUNTER — APPOINTMENT (OUTPATIENT)
Dept: SURGICAL ONCOLOGY | Facility: CLINIC | Age: 59
End: 2024-02-27

## 2024-03-11 NOTE — LETTER BODY
[Dear  ___] : Dear  [unfilled], [Dear  ___] : Dear ~IRINEO, [Attached please find my note.] : Attached please find my note.

## 2024-04-02 ENCOUNTER — APPOINTMENT (OUTPATIENT)
Dept: HEMATOLOGY ONCOLOGY | Facility: CLINIC | Age: 59
End: 2024-04-02
Payer: MEDICAID

## 2024-04-02 ENCOUNTER — TRANSCRIPTION ENCOUNTER (OUTPATIENT)
Age: 59
End: 2024-04-02

## 2024-04-02 ENCOUNTER — APPOINTMENT (OUTPATIENT)
Dept: SURGICAL ONCOLOGY | Facility: CLINIC | Age: 59
End: 2024-04-02
Payer: MEDICAID

## 2024-04-02 VITALS
TEMPERATURE: 98.2 F | HEART RATE: 104 BPM | BODY MASS INDEX: 27 KG/M2 | HEIGHT: 67 IN | OXYGEN SATURATION: 97 % | DIASTOLIC BLOOD PRESSURE: 86 MMHG | WEIGHT: 172 LBS | SYSTOLIC BLOOD PRESSURE: 121 MMHG

## 2024-04-02 DIAGNOSIS — C78.7 SECONDARY MALIGNANT NEOPLASM OF LIVER AND INTRAHEPATIC BILE DUCT: ICD-10-CM

## 2024-04-02 DIAGNOSIS — C18.7 MALIGNANT NEOPLASM OF SIGMOID COLON: ICD-10-CM

## 2024-04-02 DIAGNOSIS — Z01.818 ENCOUNTER FOR OTHER PREPROCEDURAL EXAMINATION: ICD-10-CM

## 2024-04-02 DIAGNOSIS — G89.18 OTHER ACUTE POSTPROCEDURAL PAIN: ICD-10-CM

## 2024-04-02 PROCEDURE — 99204 OFFICE O/P NEW MOD 45 MIN: CPT

## 2024-04-02 PROCEDURE — 99214 OFFICE O/P EST MOD 30 MIN: CPT

## 2024-04-02 RX ORDER — IBUPROFEN 800 MG/1
800 TABLET, FILM COATED ORAL 3 TIMES DAILY
Qty: 30 | Refills: 0 | Status: DISCONTINUED | COMMUNITY
Start: 2023-11-09 | End: 2024-04-02

## 2024-04-02 RX ORDER — OXYCODONE AND ACETAMINOPHEN 5; 325 MG/1; MG/1
5-325 TABLET ORAL
Qty: 10 | Refills: 0 | Status: DISCONTINUED | COMMUNITY
Start: 2023-11-09 | End: 2024-04-02

## 2024-04-02 RX ORDER — DOCUSATE SODIUM 100 MG/1
100 CAPSULE ORAL TWICE DAILY
Qty: 20 | Refills: 3 | Status: DISCONTINUED | COMMUNITY
Start: 2023-11-09 | End: 2024-04-02

## 2024-04-02 RX ORDER — GABAPENTIN 100 MG/1
CAPSULE ORAL
Refills: 0 | Status: DISCONTINUED | COMMUNITY
End: 2024-04-02

## 2024-04-02 NOTE — REASON FOR VISIT
[Follow-Up Visit] : a follow-up visit for [FreeTextEntry2] :  status post Robotic liver resection, Cholecystectomy, Omentectomy

## 2024-04-02 NOTE — HISTORY OF PRESENT ILLNESS
[de-identified] : Patient Name: AUGUSTINA MARES MRN: [id number] Referring Provider: Dr. Oziel Paulson Oncologist: Dr. Paulson Date: 4/2/24  Diagnosis: colon cancer, metastatic to liver Operative Date: (1) 7/11/18 (2) 10/6/22 (3) 10/18/23 (4) 11/9/23 Procedure: (1) Robotic sigmoid colon resection (@[facility]) (2) Diagnostic lap, Open partial liver resection, Repair of incisional hernia (3) Diagnostic laparoscopy with oophorectomy (4) Liver resection Pathology: (1) [id number] (2) metastatic adenoca (3)    endometriod adenocarcin (4) no residual tumor, metastatic adenoca of colon to liver w negative margins Final Pathology Showed: Liver - Metastatic adenocarcinoma, consistent with colon primary  She presents for a scheduled follow up visit to review PET-CT results.   Currently, Ms. MARES feels   Patient denies changes in bowel habits, appetite changes, abdominal pain, nausea, vomiting, unintentional weight loss, fevers or chills.  Work Up: 2/27/24 -  PET-CT 1) s/p interval partial R hepatectomy  2) Large low-attenuation/cystic collection with hypermetabolic rim at the surgical margin in the liver, may be postsurgical in etiology  3) Multiple new hypermetabolic low attenuation lesions in the liver, c/w new liver metastases 4) New hypermetabolic mesenteric lymph nodes inferior to the liver highly suspicious for malignancy   5) New hypermetabolic subcutaneous, intramuscular, intraperitoneal nodules associated with the anterior abdominal wall more c/w malignancy 6) Multiple new b/l pulm nodules, the larger ones are hypermetabolic on pet most c/w pulm mets  7) New hypermetabolic L hilar lymphadenopathy highly suspicious of malignancy  8) Increase in size and intensity of R cardiophrenic lymphadenopathy    Patient reports on and off abdominal discomfort that is not related to food. Takes Tylenol as needed. Denies nausea or vomiting. Bowel movements are every 2 days. Stools are formed. Denies rectal bleeding.  Appetite is normal. Weight stable.

## 2024-04-02 NOTE — ASSESSMENT
[FreeTextEntry1] : Impression ) metastatic colon ca AWD  Plan) reviewed current clinical information with the patient.  PET/CT from February shows widespread metastatic disease involving lungs, liver, likely peritoneum.  At this point we discussed the fact that there is no role for surgery will be in the need of systemic therapy.  Due to insurance issues she will need to change oncologist and we will see Dr. Peña multidisciplinary clinic today.  We will request prior treatment records and also will request [facility] 1 on her most recent resection that was done here back in November to see if there are any new actionable mutations.  She will follow-up with us after we have had a chance to review her most recent data.  All questions answered.  Pedro Jimenez MD  Chief Surgical Oncology Multidisciplinary GI cancer program [facility] [place]  Professor Surgery [employer]   Time spent reviewing imaging, clinical information and in consultation 40 minutes.

## 2024-04-02 NOTE — PHYSICAL EXAM
[Normal] : supple, no neck mass and thyroid not enlarged [Normal] : full range of motion and no deformities appreciated [de-identified] : well healed scars, subcutaneous nodule upper midline

## 2024-04-03 ENCOUNTER — APPOINTMENT (OUTPATIENT)
Dept: GYNECOLOGIC ONCOLOGY | Facility: CLINIC | Age: 59
End: 2024-04-03

## 2024-04-08 PROBLEM — G89.18 POST-OP PAIN: Status: RESOLVED | Noted: 2022-10-19 | Resolved: 2024-04-08

## 2024-04-08 PROBLEM — Z01.818 PRE-OP TESTING: Status: RESOLVED | Noted: 2022-09-06 | Resolved: 2024-04-08

## 2024-04-09 ENCOUNTER — APPOINTMENT (OUTPATIENT)
Dept: HEMATOLOGY ONCOLOGY | Facility: CLINIC | Age: 59
End: 2024-04-09
Payer: MEDICAID

## 2024-04-09 ENCOUNTER — APPOINTMENT (OUTPATIENT)
Dept: INTERVENTIONAL RADIOLOGY/VASCULAR | Facility: HOSPITAL | Age: 59
End: 2024-04-09
Payer: MEDICAID

## 2024-04-09 ENCOUNTER — OUTPATIENT (OUTPATIENT)
Dept: OUTPATIENT SERVICES | Facility: HOSPITAL | Age: 59
LOS: 1 days | End: 2024-04-09
Payer: COMMERCIAL

## 2024-04-09 ENCOUNTER — RESULT REVIEW (OUTPATIENT)
Age: 59
End: 2024-04-09

## 2024-04-09 VITALS
SYSTOLIC BLOOD PRESSURE: 105 MMHG | TEMPERATURE: 98.4 F | HEART RATE: 82 BPM | WEIGHT: 170 LBS | BODY MASS INDEX: 26.68 KG/M2 | DIASTOLIC BLOOD PRESSURE: 71 MMHG | RESPIRATION RATE: 16 BRPM | HEIGHT: 67 IN | OXYGEN SATURATION: 97 %

## 2024-04-09 DIAGNOSIS — Z90.89 ACQUIRED ABSENCE OF OTHER ORGANS: Chronic | ICD-10-CM

## 2024-04-09 DIAGNOSIS — T45.1X5A TOXIC GASTROENTERITIS AND COLITIS: ICD-10-CM

## 2024-04-09 DIAGNOSIS — Z98.890 OTHER SPECIFIED POSTPROCEDURAL STATES: Chronic | ICD-10-CM

## 2024-04-09 DIAGNOSIS — K52.1 TOXIC GASTROENTERITIS AND COLITIS: ICD-10-CM

## 2024-04-09 DIAGNOSIS — Z90.79 ACQUIRED ABSENCE OF OTHER GENITAL ORGAN(S): Chronic | ICD-10-CM

## 2024-04-09 DIAGNOSIS — Z90.49 ACQUIRED ABSENCE OF OTHER SPECIFIED PARTS OF DIGESTIVE TRACT: Chronic | ICD-10-CM

## 2024-04-09 LAB
ALBUMIN SERPL ELPH-MCNC: 4.5 G/DL
ALP BLD-CCNC: 249 U/L
ALT SERPL-CCNC: 38 U/L
ANION GAP SERPL CALC-SCNC: 19 MMOL/L
APTT BLD: 33.4 SEC
AST SERPL-CCNC: 33 U/L
BASOPHILS # BLD AUTO: 0.02 K/UL
BASOPHILS NFR BLD AUTO: 0.2 %
BILIRUB SERPL-MCNC: 0.3 MG/DL
BUN SERPL-MCNC: 12 MG/DL
CALCIUM SERPL-MCNC: 10 MG/DL
CEA SERPL-MCNC: 19.6 NG/ML
CHLORIDE SERPL-SCNC: 102 MMOL/L
CO2 SERPL-SCNC: 23 MMOL/L
CREAT SERPL-MCNC: 0.79 MG/DL
EGFR: 88 ML/MIN/1.73M2
EOSINOPHIL # BLD AUTO: 0.06 K/UL
EOSINOPHIL NFR BLD AUTO: 0.5 %
GLUCOSE SERPL-MCNC: 103 MG/DL
HCT VFR BLD CALC: 42.3 %
HGB BLD-MCNC: 13.6 G/DL
IMM GRANULOCYTES NFR BLD AUTO: 0.3 %
INR PPP: 1.04 RATIO
LYMPHOCYTES # BLD AUTO: 3.75 K/UL
LYMPHOCYTES NFR BLD AUTO: 32.6 %
MAN DIFF?: NORMAL
MCHC RBC-ENTMCNC: 28.2 PG
MCHC RBC-ENTMCNC: 32.2 GM/DL
MCV RBC AUTO: 87.8 FL
MONOCYTES # BLD AUTO: 0.77 K/UL
MONOCYTES NFR BLD AUTO: 6.7 %
NEUTROPHILS # BLD AUTO: 6.88 K/UL
NEUTROPHILS NFR BLD AUTO: 59.7 %
PLATELET # BLD AUTO: 243 K/UL
POTASSIUM SERPL-SCNC: 3.7 MMOL/L
PROT SERPL-MCNC: 7.3 G/DL
PT BLD: 11.8 SEC
RBC # BLD: 4.82 M/UL
RBC # FLD: 17.2 %
SODIUM SERPL-SCNC: 143 MMOL/L
WBC # FLD AUTO: 11.51 K/UL

## 2024-04-09 PROCEDURE — 99153 MOD SED SAME PHYS/QHP EA: CPT

## 2024-04-09 PROCEDURE — C1788: CPT

## 2024-04-09 PROCEDURE — 99152 MOD SED SAME PHYS/QHP 5/>YRS: CPT

## 2024-04-09 PROCEDURE — 36561 INSERT TUNNELED CV CATH: CPT

## 2024-04-09 PROCEDURE — 99215 OFFICE O/P EST HI 40 MIN: CPT

## 2024-04-09 PROCEDURE — C1769: CPT

## 2024-04-09 PROCEDURE — 76937 US GUIDE VASCULAR ACCESS: CPT | Mod: 26

## 2024-04-09 PROCEDURE — 76937 US GUIDE VASCULAR ACCESS: CPT

## 2024-04-09 RX ORDER — LIDOCAINE AND PRILOCAINE 25; 25 MG/G; MG/G
2.5-2.5 CREAM TOPICAL
Qty: 2 | Refills: 5 | Status: ACTIVE | COMMUNITY
Start: 2024-04-09 | End: 1900-01-01

## 2024-04-09 RX ORDER — LOPERAMIDE HYDROCHLORIDE 2 MG/1
2 CAPSULE ORAL
Qty: 80 | Refills: 5 | Status: ACTIVE | COMMUNITY
Start: 2024-04-09 | End: 1900-01-01

## 2024-04-09 RX ORDER — ACETAMINOPHEN AND CODEINE PHOSPHATE 300; 30 MG/1; MG/1
300-30 TABLET ORAL EVERY 8 HOURS
Qty: 20 | Refills: 0 | Status: DISCONTINUED | COMMUNITY
Start: 2023-10-24 | End: 2024-04-09

## 2024-04-09 RX ORDER — ONDANSETRON 8 MG/1
8 TABLET, ORALLY DISINTEGRATING ORAL EVERY 8 HOURS
Qty: 60 | Refills: 5 | Status: ACTIVE | COMMUNITY
Start: 2024-04-09 | End: 1900-01-01

## 2024-04-09 NOTE — PHYSICAL EXAM
[Restricted in physically strenuous activity but ambulatory and able to carry out work of a light or sedentary nature] : Status 1- Restricted in physically strenuous activity but ambulatory and able to carry out work of a light or sedentary nature, e.g., light house work, office work [Normal] : affect appropriate [de-identified] : multiple surgical scars well healed.  No organomegaly.  soft and not distended.  there is a nodule at the superior aspect of the hepatectomy incision, just a little larger than a pea, solid, fixed, worrisome

## 2024-04-09 NOTE — HISTORY OF PRESENT ILLNESS
[de-identified] : Romy Oquendo is a 58 year old female seen urgently at the request of Dr Jimenez for metastatic colon cancer.  Limited records available  Oncologic history: 1.  metastatic colon cancer --original diagnosis in 2018.  s/p sigmoid colon resection for a [id number] cancer on 7/11/2018 at [facility] with Dr Jimenez. --received adjuvant chemo (? folfox?) --liver metastases (x 2) identified in 12/2021.  went on chemo with Folfiri + cetuximab with Dr Oziel Paulson.  Partial response by PET.  Partial liver resection 10/6/22.  two lesions, both adenocarcinoma consistent with colonic primary.  MMR intact.  margins negative.  characterized by pathology as "minimal/no treatment response."   --NGS on the liver metastases from 2022:  BRCA-2 frameshift mutation (pathogenic);  PIK3CA [id number];  p53.  DEJUAN, TMB low. --received more chemo "adjuvant" unclear exactly what  --10/2023:  found to have recurrent cancer in the liver + possible peritoneal recurrence + ovarian mass --s/p diagnostic laparoscopy ([facility]) + L oophorectomy ([facility]).  No peritoneal carcinomatosis.  Ovary with endometrioid adenocarcinoma, moderately differentiated, arising from a background of endometriosis in the L ovary/fallopian tube.  1.8 cm tumor.   --followed by robotic hysterectomy + R salpingo-oophorectomy, and extended liver resection on 11/9/23.  Pathology from the liver consistent with metastatic colon cancer.  Pathology from the GYN specimens benign --PET scan 2/27/24:  Extensive metastatic malignancy.   multiple new hypermetabolic low attenuation lesions in the liver, c/w new liver metastases, New hypermetabolic mesenteric lymph nodes inferior to the liver, New hypermetabolic subcutaneous, intramuscular, intraperitoneal nodules associated with the anterior abdominal wall, Multiple new b/l pulm nodules, the larger ones are hypermetabolic on pet, New hypermetabolic L hilar lymphadenopathy, Increase in size and intensity of R cardiophrenic lymphadenopathy  2.  endometrial adenocarcinoma - stage IA- diagnosed 10/2023 - as above.  no adjuvant therapy  3.  BRCA-2 pathogenic mutation --s/p hysterectomy + BSO.  PMH, PSH reviewed in the chart [facility] - mother with ovarian cancer, MDS. SH- lives in [place].  Single. no children.  reports good support from family, friends.  nonsmoker, denies regular ETOH [de-identified] : pt seen urgently at request of Dr Jimenez presenting for follow up, recent abnormal PET scan.  has been unable to see previous oncologist due to insurance change so until today was unaware of PET findings. reporting mild abdominal pain, tylenol w/o relief, requesting T#3 which has helped in the past denies cough/sob + tingling in hands, when it is cold. denies problems w/ appetite, stable weight.

## 2024-04-09 NOTE — ASSESSMENT
[FreeTextEntry1] : Romy Oquendo is a 58 year old female BRCA-2 pathogenic mutation carrier with a history of sigmoid colon cancer originally resected in 7/2018.  At that time she had stage III disease.  Received adjuvant chemotherapy (presumably with Folfox although unconfirmed) but unfortunately she was found to have hepatic metastatic disease in 12/2021.  She then underwent chemotherapy with Folfiri + cetuximab for several cycles, followed by liver resection in 10/2022.  Pathology confirmed metastatic colon cancer, MMR intact.  She received some chemotherapy after surgery (? what type?).  In 10/2023 was found to have recurrent liver metastasis + a L ovarian mass.  The L ovary and Fallopian tube was resected in 10/2023 and was pathologically a new tumor, endometrial adenocarcinoma arising in a focus of endometriosis.  Complete surgical staging w/ hysterectomy + R salpingo-oophorectomy did not reveal any additional disease, rendering this a stage IA endometrial cancer not in need of any further therapy.  The liver metastasis was resected in 11/2023.  She did not receive any post-operative chemotherapy.  PET scan from 2/27/24 shows widely metastatic cancer - lung mets, liver mets, adenopathy above and below the diaphragm, and abdominal wall/peritoneal nodules.   Presents urgently to transition care as her previous oncologist no longer accepts her insurance.  Plan: 1.  metastatic colon cancer --reviewed history with the patient.  limited records available and she was able to supplement some information --discussed PET scan results.  She has an advanced, widely metastatic cancer.  This is not a curable situation and there is no surgical treatment to be offered at this time.  Recommend chemotherapy --Will request records from previous oncologist --refer for mediport for chemotherapy --CBC, CMP, CEA ordered --will request NGS to [facility] from last liver resection specimen --RTC 1 week for further chemo planning. --will aim to clarify w/ records or discussion w/ previous oncologist Dr Paulson:  previous treatment;  what was last treatment and when delivered? any notable side effects/treatment modifications in the past?  [facility] reports?  germline genetic testing?  2.  cancer related pain --tylenol #3.   queried.  3.  BRCA-2 pathogenic mutation carrier --identified on tumor NGS from 2022.  unknown if she ever had germline genetic testing.

## 2024-04-12 RX ORDER — SODIUM CHLORIDE 9 MG/ML
10 INJECTION INTRAMUSCULAR; INTRAVENOUS; SUBCUTANEOUS ONCE
Refills: 0 | Status: COMPLETED | OUTPATIENT
Start: 2024-04-18 | End: 2024-04-18

## 2024-04-12 NOTE — ASU DISCHARGE PLAN (ADULT/PEDIATRIC) - PROCEDURE
Diagnostic laparoscopy and left salpingo-oophorectomy Physical Therapy Discharge Summary    Name: Hunter Navarrete  MRN: [id number]   Principal Problem: Symptomatic anemia     Patient Discharged from acute Physical Therapy on 2024.  Please refer to prior PT note dated  2024  for functional status.     Assessment:     Patient appropriate for care in another setting.    Objective:     GOALS:   Multidisciplinary Problems       Physical Therapy Goals          Problem: Physical Therapy    Goal Priority Disciplines Outcome Goal Variances Interventions   Physical Therapy Goal     PT, PT/OT Adequate for Care Transition     Description: Goals to be met by: 2024    Patient will increase functional independence with mobility by performin. Supine to sit with Modified Independent.  2. Sit to supine with Modified Independent.  3. Bed to chair transfer with Modified Independent with no A.D.  using Step Transfer technique.  4. Sit to Stand with Modified Independent with no A.D. .  5. Gait  x 200  feet with Modified Independent with no A.D. .  6. Lower extremity exercise program x10 reps.                          Reasons for Discontinuation of Therapy Services  Transfer to alternate level of care.      Plan:     Patient Discharged to: Home with Home Health Service.      2024

## 2024-04-15 NOTE — HISTORY OF PRESENT ILLNESS
[de-identified] : Romy Oquendo is a 58 year old female here for f/u of metastatic colon cancer.  Limited records available  Oncologic history: 1.  metastatic colon cancer --original diagnosis in 2018.  s/p sigmoid colon resection for a [id number] cancer on 7/11/2018 at [facility] with Dr Jimenez. --received adjuvant chemo (? folfox?) --liver metastases (x 2) identified in 12/2021.  went on chemo with Folfiri + cetuximab with Dr Oziel Paulson.  Partial response by PET.  Partial liver resection 10/6/22.  two lesions, both adenocarcinoma consistent with colonic primary.  MMR intact.  margins negative.  characterized by pathology as "minimal/no treatment response."   --NGS on the liver metastases from 2022:  BRCA-2 frameshift mutation (pathogenic);  PIK3CA [id number];  p53.  DEJUAN, TMB low. --received more chemo "adjuvant" unclear exactly what  --10/2023:  found to have recurrent cancer in the liver + possible peritoneal recurrence + ovarian mass --s/p diagnostic laparoscopy ([facility]) + L oophorectomy ([facility]).  No peritoneal carcinomatosis.  Ovary with endometrioid adenocarcinoma, moderately differentiated, arising from a background of endometriosis in the L ovary/fallopian tube.  1.8 cm tumor.   --followed by robotic hysterectomy + R salpingo-oophorectomy, and extended liver resection on 11/9/23.  Pathology from the liver consistent with metastatic colon cancer.  Pathology from the GYN specimens benign --PET scan 2/27/24:  Extensive metastatic malignancy.   multiple new hypermetabolic low attenuation lesions in the liver, c/w new liver metastases, New hypermetabolic mesenteric lymph nodes inferior to the liver, New hypermetabolic subcutaneous, intramuscular, intraperitoneal nodules associated with the anterior abdominal wall, Multiple new b/l pulm nodules, the larger ones are hypermetabolic on pet, New hypermetabolic L hilar lymphadenopathy, Increase in size and intensity of R cardiophrenic lymphadenopathy  2.  endometrial adenocarcinoma - stage IA- diagnosed 10/2023 - as above.  no adjuvant therapy  3.  BRCA-2 pathogenic mutation --s/p hysterectomy + BSO.  PMH, PSH reviewed in the chart [facility] - mother with ovarian cancer, MDS. SH- lives in [place].  Single. no children.  reports good support from family, friends.  nonsmoker, denies regular ETOH [de-identified] : s/p port placement  saw oncologist Dr Paulson last week.  he mentioned treatment w/ lonsurf. she denies neuropathy, denies bleeding. she is not sure if she was ever treated w/ piat. she was not able to fill Rx for tylenol #3, still having some pain.

## 2024-04-15 NOTE — ASSESSMENT
[FreeTextEntry1] : Romy Oquendo is a 58 year old female BRCA-2 pathogenic mutation carrier with a history of sigmoid colon cancer originally resected in 7/2018.  At that time she had stage III disease.  Received adjuvant chemotherapy (presumably with Folfox although unconfirmed) but unfortunately she was found to have hepatic metastatic disease in 12/2021.  She then underwent chemotherapy with Folfiri + cetuximab for several cycles, followed by liver resection in 10/2022.  Pathology confirmed metastatic colon cancer, MMR intact.  She received some chemotherapy after surgery (? what type?).  In 10/2023 was found to have recurrent liver metastasis + a L ovarian mass.  The L ovary and Fallopian tube was resected in 10/2023 and was pathologically a new tumor, endometrial adenocarcinoma arising in a focus of endometriosis.  Complete surgical staging w/ hysterectomy + R salpingo-oophorectomy did not reveal any additional disease, rendering this a stage IA endometrial cancer not in need of any further therapy.  The liver metastasis was resected in 11/2023.  She did not receive any post-operative chemotherapy.  PET scan from 2/27/24 shows widely metastatic cancer - lung mets, liver mets, adenopathy above and below the diaphragm, and abdominal wall/peritoneal nodules.   Transitioned care from her previous oncologist 4/2024 due to insurance change.  Plan: 1.  metastatic colon cancer --reviewed history with the patient.  limited records available and she was able to supplement some information --have been unable to get records from her previous oncologist - left message for him to call back. --based on the information she has been able to provide and in the limited record available - suggest treatment with Folfox + bevacizumab.  Risks, side effects reviewed.  informed consent for treatment obtained.  will aim to start ASAP --Rx sent for supportive care meds, zofran and loperamide PRN --Will request records from previous oncologist --trend CEA --will request NGS to [facility] from last liver resection specimen --will aim to clarify w/ records or discussion w/ previous oncologist Dr Paulson:  previous treatment;  what was last treatment and when delivered? any notable side effects/treatment modifications in the past?  [facility] reports?  germline genetic testing?  2.  cancer related pain --tylenol #3 unavailable, Rx sent for tramadol. she wishes to avoid  oxycodone  3.  BRCA-2 pathogenic mutation carrier --identified on tumor NGS from 2022.  unknown if she ever had germline genetic testing.   4.  supportive care --she is aware SW involved as needed  RTC 3 weeks (for C2) CBC, CMP, CEA

## 2024-04-15 NOTE — PHYSICAL EXAM
[Restricted in physically strenuous activity but ambulatory and able to carry out work of a light or sedentary nature] : Status 1- Restricted in physically strenuous activity but ambulatory and able to carry out work of a light or sedentary nature, e.g., light house work, office work [Normal] : affect appropriate [de-identified] : multiple surgical scars well healed.  No organomegaly.  soft and not distended.  there is a nodule at the superior aspect of the hepatectomy incision, just a little larger than a pea, solid, fixed, worrisome

## 2024-04-16 ENCOUNTER — OUTPATIENT (OUTPATIENT)
Dept: OUTPATIENT SERVICES | Facility: HOSPITAL | Age: 59
LOS: 1 days | End: 2024-04-16
Payer: COMMERCIAL

## 2024-04-16 ENCOUNTER — APPOINTMENT (OUTPATIENT)
Dept: INFUSION THERAPY | Facility: CLINIC | Age: 59
End: 2024-04-16

## 2024-04-16 VITALS
RESPIRATION RATE: 16 BRPM | OXYGEN SATURATION: 98 % | SYSTOLIC BLOOD PRESSURE: 116 MMHG | TEMPERATURE: 98 F | DIASTOLIC BLOOD PRESSURE: 74 MMHG | HEART RATE: 68 BPM

## 2024-04-16 VITALS
RESPIRATION RATE: 18 BRPM | TEMPERATURE: 99 F | WEIGHT: 169.09 LBS | HEIGHT: 67 IN | OXYGEN SATURATION: 97 % | HEART RATE: 79 BPM | DIASTOLIC BLOOD PRESSURE: 69 MMHG | SYSTOLIC BLOOD PRESSURE: 105 MMHG

## 2024-04-16 DIAGNOSIS — Z98.890 OTHER SPECIFIED POSTPROCEDURAL STATES: Chronic | ICD-10-CM

## 2024-04-16 DIAGNOSIS — Z90.89 ACQUIRED ABSENCE OF OTHER ORGANS: Chronic | ICD-10-CM

## 2024-04-16 DIAGNOSIS — Z90.49 ACQUIRED ABSENCE OF OTHER SPECIFIED PARTS OF DIGESTIVE TRACT: Chronic | ICD-10-CM

## 2024-04-16 DIAGNOSIS — Z90.79 ACQUIRED ABSENCE OF OTHER GENITAL ORGAN(S): Chronic | ICD-10-CM

## 2024-04-16 DIAGNOSIS — C18.9 MALIGNANT NEOPLASM OF COLON, UNSPECIFIED: ICD-10-CM

## 2024-04-16 LAB
ALBUMIN SERPL ELPH-MCNC: 3.3 G/DL — SIGNIFICANT CHANGE UP (ref 3.3–5)
ALP SERPL-CCNC: 197 U/L — HIGH (ref 40–120)
ALT FLD-CCNC: 23 U/L — SIGNIFICANT CHANGE UP (ref 10–45)
ANION GAP SERPL CALC-SCNC: 8 MMOL/L — SIGNIFICANT CHANGE UP (ref 5–17)
APPEARANCE UR: CLEAR — SIGNIFICANT CHANGE UP
AST SERPL-CCNC: 29 U/L — SIGNIFICANT CHANGE UP (ref 10–40)
BACTERIA # UR AUTO: NEGATIVE /HPF — SIGNIFICANT CHANGE UP
BILIRUB SERPL-MCNC: 0.8 MG/DL — SIGNIFICANT CHANGE UP (ref 0.2–1.2)
BILIRUB UR-MCNC: NEGATIVE — SIGNIFICANT CHANGE UP
BUN SERPL-MCNC: 12 MG/DL — SIGNIFICANT CHANGE UP (ref 7–23)
CALCIUM SERPL-MCNC: 9.6 MG/DL — SIGNIFICANT CHANGE UP (ref 8.4–10.5)
CAST: 1 /LPF — SIGNIFICANT CHANGE UP (ref 0–4)
CHLORIDE SERPL-SCNC: 108 MMOL/L — SIGNIFICANT CHANGE UP (ref 96–108)
CO2 SERPL-SCNC: 27 MMOL/L — SIGNIFICANT CHANGE UP (ref 22–31)
COLOR SPEC: SIGNIFICANT CHANGE UP
CREAT SERPL-MCNC: 0.6 MG/DL — SIGNIFICANT CHANGE UP (ref 0.5–1.3)
DIFF PNL FLD: NEGATIVE — SIGNIFICANT CHANGE UP
EGFR: 105 ML/MIN/1.73M2 — SIGNIFICANT CHANGE UP
GLUCOSE SERPL-MCNC: 108 MG/DL — HIGH (ref 70–99)
GLUCOSE UR QL: NEGATIVE MG/DL — SIGNIFICANT CHANGE UP
HCT VFR BLD CALC: 38.1 % — SIGNIFICANT CHANGE UP (ref 34.5–45)
HGB BLD-MCNC: 12.1 G/DL — SIGNIFICANT CHANGE UP (ref 11.5–15.5)
KETONES UR-MCNC: ABNORMAL MG/DL
LEUKOCYTE ESTERASE UR-ACNC: ABNORMAL
LYMPHOCYTES # BLD AUTO: 2.5 K/UL — SIGNIFICANT CHANGE UP (ref 1–3.3)
LYMPHOCYTES # BLD AUTO: 29.4 % — SIGNIFICANT CHANGE UP (ref 13–44)
MCHC RBC-ENTMCNC: 27.8 PG — SIGNIFICANT CHANGE UP (ref 27–34)
MCHC RBC-ENTMCNC: 31.8 GM/DL — LOW (ref 32–36)
MCV RBC AUTO: 87.6 FL — SIGNIFICANT CHANGE UP (ref 80–100)
MUCOUS THREADS # UR AUTO: PRESENT
NEUTROPHILS # BLD AUTO: 5.5 K/UL — SIGNIFICANT CHANGE UP (ref 1.8–7.4)
NEUTROPHILS NFR BLD AUTO: 65.3 % — SIGNIFICANT CHANGE UP (ref 43–77)
NITRITE UR-MCNC: NEGATIVE — SIGNIFICANT CHANGE UP
PH UR: 5.5 — SIGNIFICANT CHANGE UP (ref 5–8)
PLATELET # BLD AUTO: 257 K/UL — SIGNIFICANT CHANGE UP (ref 150–400)
POTASSIUM SERPL-MCNC: 4.1 MMOL/L — SIGNIFICANT CHANGE UP (ref 3.5–5.3)
POTASSIUM SERPL-SCNC: 4.1 MMOL/L — SIGNIFICANT CHANGE UP (ref 3.5–5.3)
PROT SERPL-MCNC: 7.3 G/DL — SIGNIFICANT CHANGE UP (ref 6–8.3)
PROT UR-MCNC: SIGNIFICANT CHANGE UP MG/DL
RBC # BLD: 4.35 M/UL — SIGNIFICANT CHANGE UP (ref 3.8–5.2)
RBC # FLD: 15.9 % — HIGH (ref 10.3–14.5)
RBC CASTS # UR COMP ASSIST: 0 /HPF — SIGNIFICANT CHANGE UP (ref 0–4)
SODIUM SERPL-SCNC: 143 MMOL/L — SIGNIFICANT CHANGE UP (ref 135–145)
SP GR SPEC: 1.02 — SIGNIFICANT CHANGE UP (ref 1–1.03)
SQUAMOUS # UR AUTO: 7 /HPF — HIGH (ref 0–5)
UROBILINOGEN FLD QL: 1 MG/DL — SIGNIFICANT CHANGE UP (ref 0.2–1)
WBC # BLD: 8.5 K/UL — SIGNIFICANT CHANGE UP (ref 3.8–10.5)
WBC # FLD AUTO: 8.5 K/UL — SIGNIFICANT CHANGE UP (ref 3.8–10.5)
WBC UR QL: 1 /HPF — SIGNIFICANT CHANGE UP (ref 0–5)

## 2024-04-16 PROCEDURE — 96411 CHEMO IV PUSH ADDL DRUG: CPT

## 2024-04-16 PROCEDURE — 96417 CHEMO IV INFUS EACH ADDL SEQ: CPT

## 2024-04-16 PROCEDURE — 96367 TX/PROPH/DG ADDL SEQ IV INF: CPT

## 2024-04-16 PROCEDURE — 80053 COMPREHEN METABOLIC PANEL: CPT

## 2024-04-16 PROCEDURE — 96375 TX/PRO/DX INJ NEW DRUG ADDON: CPT

## 2024-04-16 PROCEDURE — 36415 COLL VENOUS BLD VENIPUNCTURE: CPT

## 2024-04-16 PROCEDURE — 81001 URINALYSIS AUTO W/SCOPE: CPT

## 2024-04-16 PROCEDURE — 96416 CHEMO PROLONG INFUSE W/PUMP: CPT

## 2024-04-16 PROCEDURE — 85025 COMPLETE CBC W/AUTO DIFF WBC: CPT

## 2024-04-16 PROCEDURE — 96413 CHEMO IV INFUSION 1 HR: CPT

## 2024-04-16 RX ORDER — BEVACIZUMAB 400 MG/16ML
385 INJECTION, SOLUTION INTRAVENOUS ONCE
Refills: 0 | Status: COMPLETED | OUTPATIENT
Start: 2024-04-16 | End: 2024-04-16

## 2024-04-16 RX ORDER — DEXAMETHASONE 0.5 MG/5ML
10 ELIXIR ORAL ONCE
Refills: 0 | Status: COMPLETED | OUTPATIENT
Start: 2024-04-16 | End: 2024-04-16

## 2024-04-16 RX ORDER — PALONOSETRON HYDROCHLORIDE 0.25 MG/5ML
0.25 INJECTION, SOLUTION INTRAVENOUS ONCE
Refills: 0 | Status: COMPLETED | OUTPATIENT
Start: 2024-04-16 | End: 2024-04-16

## 2024-04-16 RX ORDER — FOSAPREPITANT DIMEGLUMINE 150 MG/5ML
150 INJECTION, POWDER, LYOPHILIZED, FOR SOLUTION INTRAVENOUS ONCE
Refills: 0 | Status: COMPLETED | OUTPATIENT
Start: 2024-04-16 | End: 2024-04-16

## 2024-04-16 RX ORDER — FLUOROURACIL 50 MG/ML
750 INJECTION, SOLUTION INTRAVENOUS ONCE
Refills: 0 | Status: COMPLETED | OUTPATIENT
Start: 2024-04-16 | End: 2024-04-16

## 2024-04-16 RX ORDER — FLUOROURACIL 50 MG/ML
4530 INJECTION, SOLUTION INTRAVENOUS ONCE
Refills: 0 | Status: COMPLETED | OUTPATIENT
Start: 2024-04-16 | End: 2024-04-16

## 2024-04-16 RX ORDER — LEUCOVORIN CALCIUM 5 MG
750 TABLET ORAL ONCE
Refills: 0 | Status: COMPLETED | OUTPATIENT
Start: 2024-04-16 | End: 2024-04-16

## 2024-04-16 RX ORDER — DIPHENHYDRAMINE HCL 50 MG
25 CAPSULE ORAL ONCE
Refills: 0 | Status: COMPLETED | OUTPATIENT
Start: 2024-04-16 | End: 2024-04-16

## 2024-04-16 RX ORDER — LIDOCAINE 4 G/100G
1 CREAM TOPICAL ONCE
Refills: 0 | Status: COMPLETED | OUTPATIENT
Start: 2024-04-16 | End: 2024-04-16

## 2024-04-16 RX ORDER — OXALIPLATIN 5 MG/ML
140 INJECTION, SOLUTION INTRAVENOUS ONCE
Refills: 0 | Status: COMPLETED | OUTPATIENT
Start: 2024-04-16 | End: 2024-04-16

## 2024-04-16 RX ADMIN — Medication 750 MILLIGRAM(S): at 15:00

## 2024-04-16 RX ADMIN — Medication 25 MILLIGRAM(S): at 14:05

## 2024-04-16 RX ADMIN — FOSAPREPITANT DIMEGLUMINE 500 MILLIGRAM(S): 150 INJECTION, POWDER, LYOPHILIZED, FOR SOLUTION INTRAVENOUS at 14:08

## 2024-04-16 RX ADMIN — FLUOROURACIL 4530 MILLIGRAM(S): 50 INJECTION, SOLUTION INTRAVENOUS at 17:20

## 2024-04-16 RX ADMIN — LIDOCAINE 1 APPLICATION(S): 4 CREAM TOPICAL at 10:25

## 2024-04-16 RX ADMIN — BEVACIZUMAB 385 MILLIGRAM(S): 400 INJECTION, SOLUTION INTRAVENOUS at 13:17

## 2024-04-16 RX ADMIN — FOSAPREPITANT DIMEGLUMINE 150 MILLIGRAM(S): 150 INJECTION, POWDER, LYOPHILIZED, FOR SOLUTION INTRAVENOUS at 14:40

## 2024-04-16 RX ADMIN — Medication 10 MILLIGRAM(S): at 13:47

## 2024-04-16 RX ADMIN — Medication 204 MILLIGRAM(S): at 13:32

## 2024-04-16 RX ADMIN — Medication 202 MILLIGRAM(S): at 13:50

## 2024-04-16 RX ADMIN — PALONOSETRON HYDROCHLORIDE 0.25 MILLIGRAM(S): 0.25 INJECTION, SOLUTION INTRAVENOUS at 13:31

## 2024-04-16 RX ADMIN — BEVACIZUMAB 385 MILLIGRAM(S): 400 INJECTION, SOLUTION INTRAVENOUS at 11:49

## 2024-04-16 RX ADMIN — OXALIPLATIN 140 MILLIGRAM(S): 5 INJECTION, SOLUTION INTRAVENOUS at 17:01

## 2024-04-16 RX ADMIN — OXALIPLATIN 140 MILLIGRAM(S): 5 INJECTION, SOLUTION INTRAVENOUS at 15:00

## 2024-04-16 RX ADMIN — FLUOROURACIL 90 MILLIGRAM(S): 50 INJECTION, SOLUTION INTRAVENOUS at 17:01

## 2024-04-16 RX ADMIN — Medication 750 MILLIGRAM(S): at 17:02

## 2024-04-16 NOTE — DISCHARGE INSTRUCTIONS: CHEMOTHERAPY - PATIENT/SIGNIFICANT OTHER VERBALIZE(S) UNDERSTANDING OF TREATMENT PLAN AND THE POTENTIAL SIDE EFFECTS OF THE MEDICATIONS GIVEN DURING TREATMENT AND/OR MEDICATIONS USED AT HOME TO MANAGE SIDE EFFECTS
[facility] EMERGENCY DEPARTMENT     Attending Note    Patient: Skylar Hopper Age: 67 year old Sex: female   MRN: [id number] : 1955 Encounter Date: 2022     History     Chief Complaint   Patient presents with   • Dizziness       HISTORY OF PRESENT ILLNESS      Skylar Hopper is a 67 year old female presenting to the emergency department today for evaluation of room spinning sensation and blurred vision since 200pm.  Patient states that she was sitting on a couch when all the sudden started experiencing blurred vision dizziness, feeling off balance with room spinning sensation.  She states that she feels so weak that she cannot walk and every time she tried to stand up she kept falling back on the couch.  She reports that she is now feeling little bit lightheaded but does endorse room spinning sensation.  She has a history of COPD and has chronic shortness of breath but denies any acute changes.  Denies any chest pain.  Denies any palpitations.  No recent illness, no fevers.  Denies any tinnitus.  Denies any earache.  Denies any other complaints this time.    Last known well was at 200pm, symptoms started abruptly while patient was sitting down on the couch.    EM Caveat:  Clinical condition patient was taken to CT scanner after brief interview and exam  Social Hx: daily smoker, no drug use no alcohol use reported currently.      PAST HISTORY           Past Medical History:   Diagnosis Date   • Anxiety    • Arthritis    • Bipolar 1 disorder (CMS/HCC)    • COPD (chronic obstructive pulmonary disease) (CMS/[facility])    • Depression    • Dyslipidemia    • Head ache    • Sleep apnea     sleep test on  no cpap yet    Past Surgical History:   Procedure Laterality Date   • CATARACT EXTRACTION W/ INTRAOCULAR LENS IMPLANT Left 2020    Sydney Solis MD   • CATARACT EXTRACTION W/ INTRAOCULAR LENS IMPLANT Right 2020    Sydney Solis MD   • COLONOSCOPY  2020   • EYE SURGERY       cooper cataracts      Additional PMH: Additional PSH:          Social History     Tobacco Use   • Smoking status: Current Every Day Smoker     Packs/day: 1.00     Years: 47.00     Pack years: 47.00     Types: Cigarettes     Start date: 1975   • Smokeless tobacco: Never Used   Vaping Use   • Vaping Use: never used   Substance Use Topics   • Alcohol use: Not Currently   • Drug use: Never    Family History   Problem Relation Age of Onset   • Diabetes Father    • Patient is unaware of any medical problems Sister    • Patient is unaware of any medical problems Sister    • Hypertension Brother    • Patient is unaware of any medical problems Brother    • Patient is unaware of any medical problems Brother    • Cancer, Colon Neg Hx    • Cancer, Breast Neg Hx       Additional SH: Additional FH:          Prior to Admission medications    Medication Sig Start Date End Date Taking? Authorizing Provider   OLANZapine (ZyPREXA) 5 MG tablet Take 5 mg by mouth nightly. 10/20/22   Outside Provider   amoxicillin-clavulanate (Augmentin) 875-125 MG per tablet Take 1 tablet by mouth every 12 hours for 7 days. Take with food. 10/28/22 11/4/22  Deena Harmon MD   fluticasone (FLONASE) 50 MCG/ACT nasal spray Spray 1 spray in each nostril daily. 10/28/22   Deena Harmon MD   Simethicone 125 MG Tab Take 125 mg by mouth 2 times daily as needed (gas). 10/28/22   Deena Harmon MD   albuterol 108 (90 Base) MCG/ACT inhaler Inhale 2 puffs into the lungs every 4 hours as needed for Shortness of Breath or Wheezing. 10/28/22   Deena Harmon MD   atorvastatin (LIPITOR) 10 MG tablet TAKE 1 TABLET BY MOUTH DAILY 10/26/22   Deena Harmon MD   lithium (ESKALITH) 450 MG CR tablet Take 450 mg by mouth every evening. 9/16/22   Outside Provider   fluticasone-umeclidin-vilanterol (Trelegy Ellipta) 100-62.5-25 MCG/INH inhaler Inhale 1 puff into the lungs daily. 8/15/22   FIORELLA Aguilar   buPROPion XL (WELLBUTRIN XL) 300 MG 24 hr tablet  Take 300 mg by mouth daily. 7/25/22   Outside Provider   Cholecalciferol 100 mcg (4,000 units) capsule Take 100 mcg by mouth daily.    Outside Provider   Multiple Vitamin (MULTIVITAMIN ADULT PO) Take 1 tablet by mouth daily.    Outside Provider   Omega-3 Fatty Acids (Fish Oil) 1000 MG capsule Take 1 g by mouth daily.    Outside Provider   lithium (LITHOBID) 300 MG CR tablet Indications: Manic-Depression One in the morning and one at bedtime  Patient taking differently: Take 300 mg by mouth every morning. Indications: Manic-Depression One in the morning and one at bedtime 2/18/20   FIORELLA Finnegan    No Known Allergies       Review of Systems     Constitutional: Negative for fevers or chills.   HENT: Negative for sore throat, ear pain.    Eyes: Negative for discharge.   Respiratory: Negative for shortness of breath  Cardiovascular: Negative for palpitations or chest pain  Gastrointestinal: Negative for abdominal pain, nausea or vomiting.   Endocrine: Negative for polydipsia and polyuria.   Genitourinary: Negative for dysuria or hematuria.   Musculoskeletal:  Positive for feeling off balance.  Positive for generalized weakness, unable to walk  Skin: Negative for rash.   Allergic/Immunologic: Negative for immunocompromised state.   Neurological: Negative for seizures.  Positive for blurred vision, positive for vertiginous symptoms, positive for balance issues  Hematological: Does not bruise/bleed easily.   Psychiatric/Behavioral: Negative for agitation or confusion    Physical Exam     Vitals:    11/02/22 1736   BP: 133/73   Pulse: 64   Resp: 16   Temp:        General/Constitutional:  67 year old female in no acute distress.  Nontoxic appearance  Skin: Dry with good color, no rashes on exposed skin  Head: Normocephalic, atraumatic.   Neck: Normal range of motion   Eye: extraocular movements are intact.  Normal conjunctiva, no discharge  Ears, nose, mouth and throat: external appearance normal without  obvious drainage.   Cardiovascular: Regular rate and rhythm on patient monitor, Normal peripheral perfusion.  Respiratory: Normal respiratory effort, no distress.  Speaks in full sentences, no air hunger.  Satting well on room air   Chest wall: No deformity  Musculoskeletal:  no deformity.   Gastrointestinal: Non distended  Genitourinary:  Not indicated  Neurological: Alert and oriented to person, place, time, and situation, normal speech, no pronator drift, gait not assessed.  Strength testing is normal in upper and lower extremities.  See NIH score  Psychiatric: Cooperative. appropriate mood/affect.  Normal judgment    Additional Exam Findings:   none    Assessment/Plan       Skylar Hopper is a 67 year old female presenting to the ED as a code stroke with symptoms of vertigo, feeling off balance and bilateral blurred vision. CT scan shows no acute pathology. Lab work pending, will evaluate for electrolyte abnormalities, anemia, hepatic dysfunction. Patient´s NIH score is 0, at this time, per neuro recommendations after evaluation, there is no indication for tPA due to low NIH score and patients presentation c/w possible BPPV, risk of bleeding outweighs benefit of medication at this time. Will admit for further evaluation. Discussed case with teleneurologist, Dr. Dave, who at this time recommends MRI brain and PT consult for epley maneuver   No further recommendations at this time.     NIH Stroke Scale 0 (11/02/22 1528)    Assessment Interval  Initial   Level of Consciousness 0 Alert   LOC Questions 0 Answers both questions correctly   LOC Commands 0 Performs both tasks correctly   Best Gaze 0 Normal   Visual 0 No visual loss   Facial Palsy 0 Normal   Motor Arm-Left 0 No drift   Motor Arm-Right 0 No drift   Motor Leg-Left 0 No drift   Motor Leg-Right 0 No drift   Limb Ataxia 0 Absent   Sensory 0 Normal   Best Language 0 No aphasia   Dysarthria 0 Normal articulation   Extinction and Inattention 0 No abnormality    NIHSS Score 0              ED Course     ED Course as of 11/02/22 1738   Wed Nov 02, 2022   1550 EKG done at 3:49 p.m. shows sinus rhythm with first-degree AV block at 66 beats per minute, no ST segment elevations or depressions to suggest acute ischemic event.  No STEMI.  Patient does have a right bundle-branch block which is new compared to EKG done in March of 2021.  Otherwise no significant changes noted.  ECG interpretation done independently by myself without presence of cardiologist, final read pending.    [MR]   1636 CT Angio Head and Neck Level 1  *  Severe stenosis at the origin of the dominant right vertebral artery,   moderate stenosis of the left vertebral artery origin.   *  High-grade proximal right subclavian artery stenosis.   *  Near-occlusive proximal left subclavian artery stenosis.  [MR]      ED Course User Index  [MR] Michael Villatoro MD       Testing Results     Results for orders placed or performed during the hospital encounter of 11/02/22   Comprehensive Metabolic Panel   Result Value Ref Range    Fasting Status      Sodium 142 135 - 145 mmol/L    Potassium 3.6 3.4 - 5.1 mmol/L    Chloride 105 97 - 110 mmol/L    Carbon Dioxide 30 21 - 32 mmol/L    Anion Gap 11 7 - 19 mmol/L    Glucose 129 (H) 70 - 99 mg/dL    BUN 18 6 - 20 mg/dL    Creatinine 0.99 (H) 0.51 - 0.95 mg/dL    Glomerular Filtration Rate 63 >=60    BUN/ Creatinine Ratio 18 7 - 25    Calcium 9.9 8.4 - 10.2 mg/dL    Bilirubin, Total 0.3 0.2 - 1.0 mg/dL    GOT/AST 9 <=37 Units/L    GPT/ALT 24 <64 Units/L    Alkaline Phosphatase 96 45 - 117 Units/L    Albumin 3.5 (L) 3.6 - 5.1 g/dL    Protein, Total 7.2 6.4 - 8.2 g/dL    Globulin 3.7 2.0 - 4.0 g/dL    A/G Ratio 0.9 (L) 1.0 - 2.4   Prothrombin Time   Result Value Ref Range    Prothrombin Time 10.4 9.7 - 11.8 sec    INR 0.9     Partial Thromboplastin Time   Result Value Ref Range    PTT 25 22 - 30 sec   TROPONIN I, HIGH SENSITIVITY   Result Value Ref Range    Troponin I, High  Statement Selected Sensitivity 59 (HH) <52 ng/L   CBC with Automated Differential (performable only)   Result Value Ref Range    WBC 8.2 4.2 - 11.0 K/mcL    RBC 4.16 4.00 - 5.20 mil/mcL    HGB 13.2 12.0 - 15.5 g/dL    HCT 40.8 36.0 - 46.5 %    MCV 98.1 78.0 - 100.0 fl    MCH 31.7 26.0 - 34.0 pg    MCHC 32.4 32.0 - 36.5 g/dL    RDW-CV 13.3 11.0 - 15.0 %    RDW-SD 48.0 39.0 - 50.0 fL     140 - 450 K/mcL    NRBC 0 <=0 /100 WBC    Neutrophil, Percent 58 %    Lymphocytes, Percent 26 %    Mono, Percent 9 %    Eosinophils, Percent 5 %    Basophils, Percent 1 %    Immature Granulocytes 1 %    Absolute Neutrophils 4.9 1.8 - 7.7 K/mcL    Absolute Lymphocytes 2.2 1.0 - 4.0 K/mcL    Absolute Monocytes 0.7 0.3 - 0.9 K/mcL    Absolute Eosinophils  0.4 0.0 - 0.5 K/mcL    Absolute Basophils 0.0 0.0 - 0.3 K/mcL    Absolute Immmature Granulocytes 0.0 0.0 - 0.2 K/mcL   GLUCOSE, BEDSIDE - POINT OF CARE   Result Value Ref Range    GLUCOSE, BEDSIDE - POINT OF CARE 133 (H) 70 - 99 mg/dL       CT Angio Head and Neck Level 1   Final Result   IMPRESSION:      CTA head:     *  No significant intracranial vascular stenosis or large-vessel occlusion.        *  One or more vascular anatomic variants as described.      CTA neck:     *  No evidence of carotid artery high-grade stenosis, dissection, or   occlusion.   *  Severe stenosis at the origin of the dominant right vertebral artery,   moderate stenosis of the left vertebral artery origin.   *  High-grade proximal right subclavian artery stenosis.   *  Near-occlusive proximal left subclavian artery stenosis.      CT head with contrast:     *  Negative/no acute intracranial findings.            CT Head Level 1   Final Result   IMPRESSION:        1.  No convincing evidence of acute intraparenchymal hemorrhage.   2.  Minimal generalized volume loss with possible findings of mild chronic   small vessel ischemic disease.   3.  Given the clinical history, if there remains concern for acute/subacute   ischemia,  MRI of the brain can be obtained for more sensitive evaluation.      Results were communicated to Dr. Michael Villatoro on November 2, 2022 at 3:48 PM.\"            MRI BRAIN WO CONTRAST    (Results Pending)       Consultations/Phone Calls        Consultations: teleneurology consulted, recommend Mri and PT consult for epley maneuver. No further recs. Will admit given elevated trop and concerning findings on CTA for MRI imaging as well     Clinical Impression     ED Diagnosis   1. Vertigo     2. Vertebral artery stenosis, bilateral           Disposition        Admit 11/2/2022  5:38 PM  Telemetry Bed?: Yes  Admitting Physician: UZIEL GARCÍA [[id number]]  Transferring Patient to? Only adjust for transfers between [facility] and [facility] **PLEASE ONLY USE BUTTON OPTIONS**: [facility] [[id number]]  Is this a telephone or verbal order?: This is a telephone order from the admitting physician      The 21st Century Cures Act makes medical notes like these available to patients in the interest of transparency. However, be advised this is a medical document. It is intended as peer to peer communication. It is written in medical language and may contain abbreviations or verbiage that are unfamiliar. It may appear blunt or direct. Medical documents are intended to carry relevant information, facts as evident, and the clinical opinion of the practitioner.     Michael Villatoro MD  11/02/22 1734

## 2024-04-16 NOTE — DISCHARGE INSTRUCTIONS: CHEMOTHERAPY - DC SYMPTOM 3
Signs of bleeding (nose bleeds, bleeding gums, blackened stool, unusual bruising) Abbe Flap (Lower To Upper Lip) Text: The defect of the upper lip was assessed and measured.  Given the location and size of the defect, an Abbe flap was deemed most appropriate.  Using a sterile surgical marker, an appropriate Abbe flap was measured and drawn on the lower lip. Local anesthesia was then infiltrated. A scalpel was then used to incise the upper lip through and through the skin, vermilion, muscle and mucosa, leaving the flap pedicled on the opposite side.  The flap was then rotated and transferred to the lower lip defect.  The flap was then sutured into place with a three layer technique, closing the orbicularis oris muscle layer with subcutaneous buried sutures, followed by a mucosal layer and an epidermal layer.

## 2024-04-16 NOTE — DISCHARGE INSTRUCTIONS: CHEMOTHERAPY - NSRNDCACTIVITY1_HEME_A_AMB
Chief Complaint   Patient presents with    Cholesterol Problem    Results     labs last week     1. \"Have you been to the ER, urgent care clinic since your last visit? Hospitalized since your last visit? \" No    2. \"Have you seen or consulted any other health care providers outside of the [address] [place] [address] since your last visit? \" No     3. For patients over 45: Has the patient had a colonoscopy? 11/2020 Dr Sharri Diallo repeat 5 years     If the patient is female:    4. For patients over 40: Has the patient had a mammogram? In system    5. For patients over 21: Has the patient had a pap smear?  Gyn Dr Estes Counts [phone]ney Oliveira Yes

## 2024-04-18 ENCOUNTER — OUTPATIENT (OUTPATIENT)
Dept: OUTPATIENT SERVICES | Facility: HOSPITAL | Age: 59
LOS: 1 days | End: 2024-04-18
Payer: COMMERCIAL

## 2024-04-18 ENCOUNTER — APPOINTMENT (OUTPATIENT)
Dept: INFUSION THERAPY | Facility: CLINIC | Age: 59
End: 2024-04-18

## 2024-04-18 VITALS
RESPIRATION RATE: 15 BRPM | OXYGEN SATURATION: 98 % | HEART RATE: 66 BPM | TEMPERATURE: 98 F | SYSTOLIC BLOOD PRESSURE: 112 MMHG | DIASTOLIC BLOOD PRESSURE: 72 MMHG

## 2024-04-18 DIAGNOSIS — Z98.890 OTHER SPECIFIED POSTPROCEDURAL STATES: Chronic | ICD-10-CM

## 2024-04-18 DIAGNOSIS — Z90.49 ACQUIRED ABSENCE OF OTHER SPECIFIED PARTS OF DIGESTIVE TRACT: Chronic | ICD-10-CM

## 2024-04-18 DIAGNOSIS — Z90.79 ACQUIRED ABSENCE OF OTHER GENITAL ORGAN(S): Chronic | ICD-10-CM

## 2024-04-18 DIAGNOSIS — Z90.89 ACQUIRED ABSENCE OF OTHER ORGANS: Chronic | ICD-10-CM

## 2024-04-18 DIAGNOSIS — C18.9 MALIGNANT NEOPLASM OF COLON, UNSPECIFIED: ICD-10-CM

## 2024-04-18 PROCEDURE — 96365 THER/PROPH/DIAG IV INF INIT: CPT

## 2024-04-18 RX ADMIN — SODIUM CHLORIDE 10 MILLILITER(S): 9 INJECTION INTRAMUSCULAR; INTRAVENOUS; SUBCUTANEOUS at 15:08

## 2024-04-18 RX ADMIN — FLUOROURACIL 4530 MILLIGRAM(S): 50 INJECTION, SOLUTION INTRAVENOUS at 15:00

## 2024-04-30 ENCOUNTER — NON-APPOINTMENT (OUTPATIENT)
Age: 59
End: 2024-04-30

## 2024-04-30 RX ORDER — LIDOCAINE 4 G/100G
1 CREAM TOPICAL ONCE
Refills: 0 | Status: COMPLETED | OUTPATIENT
Start: 2024-05-01 | End: 2024-05-01

## 2024-04-30 RX ORDER — LEUCOVORIN CALCIUM 5 MG
750 TABLET ORAL ONCE
Refills: 0 | Status: COMPLETED | OUTPATIENT
Start: 2024-05-01 | End: 2024-05-01

## 2024-04-30 RX ORDER — PALONOSETRON HYDROCHLORIDE 0.25 MG/5ML
0.25 INJECTION, SOLUTION INTRAVENOUS ONCE
Refills: 0 | Status: COMPLETED | OUTPATIENT
Start: 2024-05-01 | End: 2024-05-01

## 2024-04-30 RX ORDER — DIPHENHYDRAMINE HCL 50 MG
25 CAPSULE ORAL ONCE
Refills: 0 | Status: COMPLETED | OUTPATIENT
Start: 2024-05-01 | End: 2024-05-01

## 2024-04-30 RX ORDER — FOSAPREPITANT DIMEGLUMINE 150 MG/5ML
150 INJECTION, POWDER, LYOPHILIZED, FOR SOLUTION INTRAVENOUS ONCE
Refills: 0 | Status: COMPLETED | OUTPATIENT
Start: 2024-05-01 | End: 2024-05-01

## 2024-04-30 RX ORDER — FLUOROURACIL 50 MG/ML
4530 INJECTION, SOLUTION INTRAVENOUS ONCE
Refills: 0 | Status: COMPLETED | OUTPATIENT
Start: 2024-05-01 | End: 2024-05-01

## 2024-05-01 ENCOUNTER — LABORATORY RESULT (OUTPATIENT)
Age: 59
End: 2024-05-01

## 2024-05-01 ENCOUNTER — OUTPATIENT (OUTPATIENT)
Dept: OUTPATIENT SERVICES | Facility: HOSPITAL | Age: 59
LOS: 1 days | End: 2024-05-01
Payer: COMMERCIAL

## 2024-05-01 ENCOUNTER — APPOINTMENT (OUTPATIENT)
Dept: HEMATOLOGY ONCOLOGY | Facility: CLINIC | Age: 59
End: 2024-05-01
Payer: MEDICAID

## 2024-05-01 ENCOUNTER — APPOINTMENT (OUTPATIENT)
Dept: INFUSION THERAPY | Facility: CLINIC | Age: 59
End: 2024-05-01

## 2024-05-01 VITALS
OXYGEN SATURATION: 98 % | HEART RATE: 102 BPM | SYSTOLIC BLOOD PRESSURE: 112 MMHG | RESPIRATION RATE: 17 BRPM | TEMPERATURE: 98 F | WEIGHT: 169.98 LBS | HEIGHT: 67 IN | DIASTOLIC BLOOD PRESSURE: 74 MMHG

## 2024-05-01 VITALS
OXYGEN SATURATION: 98 % | DIASTOLIC BLOOD PRESSURE: 74 MMHG | TEMPERATURE: 97.9 F | RESPIRATION RATE: 18 BRPM | HEART RATE: 102 BPM | SYSTOLIC BLOOD PRESSURE: 112 MMHG | HEIGHT: 67 IN | BODY MASS INDEX: 26.68 KG/M2 | WEIGHT: 170 LBS

## 2024-05-01 DIAGNOSIS — C18.9 MALIGNANT NEOPLASM OF COLON, UNSPECIFIED: ICD-10-CM

## 2024-05-01 DIAGNOSIS — R23.8 OTHER SKIN CHANGES: ICD-10-CM

## 2024-05-01 DIAGNOSIS — Z90.89 ACQUIRED ABSENCE OF OTHER ORGANS: Chronic | ICD-10-CM

## 2024-05-01 DIAGNOSIS — Z90.49 ACQUIRED ABSENCE OF OTHER SPECIFIED PARTS OF DIGESTIVE TRACT: Chronic | ICD-10-CM

## 2024-05-01 DIAGNOSIS — Z98.890 OTHER SPECIFIED POSTPROCEDURAL STATES: Chronic | ICD-10-CM

## 2024-05-01 LAB
ALBUMIN SERPL ELPH-MCNC: 3.3 G/DL
ALP BLD-CCNC: 191 U/L
ALT SERPL-CCNC: 35 U/L
ANION GAP SERPL CALC-SCNC: 1 MMOL/L
AST SERPL-CCNC: 33 U/L
BILIRUB SERPL-MCNC: 0.6 MG/DL
BUN SERPL-MCNC: 15 MG/DL
CALCIUM SERPL-MCNC: 9.7 MG/DL
CEA SERPL-MCNC: 13.3 NG/ML
CHLORIDE SERPL-SCNC: 110 MMOL/L
CO2 SERPL-SCNC: 29 MMOL/L
CREAT SERPL-MCNC: 0.6 MG/DL
EGFR: 105 ML/MIN/1.73M2
GLUCOSE SERPL-MCNC: 135 MG/DL
POTASSIUM SERPL-SCNC: 4.3 MMOL/L
PROT SERPL-MCNC: 7.1 G/DL
SODIUM SERPL-SCNC: 140 MMOL/L

## 2024-05-01 PROCEDURE — 96375 TX/PRO/DX INJ NEW DRUG ADDON: CPT

## 2024-05-01 PROCEDURE — 96367 TX/PROPH/DG ADDL SEQ IV INF: CPT

## 2024-05-01 PROCEDURE — 96417 CHEMO IV INFUS EACH ADDL SEQ: CPT

## 2024-05-01 PROCEDURE — 99214 OFFICE O/P EST MOD 30 MIN: CPT | Mod: 25

## 2024-05-01 PROCEDURE — 96416 CHEMO PROLONG INFUSE W/PUMP: CPT

## 2024-05-01 PROCEDURE — 96413 CHEMO IV INFUSION 1 HR: CPT

## 2024-05-01 RX ORDER — OXALIPLATIN 5 MG/ML
140 INJECTION, SOLUTION INTRAVENOUS ONCE
Refills: 0 | Status: COMPLETED | OUTPATIENT
Start: 2024-05-01 | End: 2024-05-01

## 2024-05-01 RX ORDER — SODIUM CHLORIDE 9 MG/ML
10 INJECTION INTRAMUSCULAR; INTRAVENOUS; SUBCUTANEOUS ONCE
Refills: 0 | Status: COMPLETED | OUTPATIENT
Start: 2024-05-03 | End: 2024-05-03

## 2024-05-01 RX ORDER — DEXAMETHASONE 0.5 MG/5ML
10 ELIXIR ORAL ONCE
Refills: 0 | Status: COMPLETED | OUTPATIENT
Start: 2024-05-01 | End: 2024-05-01

## 2024-05-01 RX ORDER — BEVACIZUMAB 400 MG/16ML
385 INJECTION, SOLUTION INTRAVENOUS ONCE
Refills: 0 | Status: COMPLETED | OUTPATIENT
Start: 2024-05-01 | End: 2024-05-01

## 2024-05-01 RX ADMIN — BEVACIZUMAB 385 MILLIGRAM(S): 400 INJECTION, SOLUTION INTRAVENOUS at 11:53

## 2024-05-01 RX ADMIN — FOSAPREPITANT DIMEGLUMINE 500 MILLIGRAM(S): 150 INJECTION, POWDER, LYOPHILIZED, FOR SOLUTION INTRAVENOUS at 13:15

## 2024-05-01 RX ADMIN — Medication 25 MILLIGRAM(S): at 14:00

## 2024-05-01 RX ADMIN — Medication 750 MILLIGRAM(S): at 13:40

## 2024-05-01 RX ADMIN — Medication 10 MILLIGRAM(S): at 13:13

## 2024-05-01 RX ADMIN — OXALIPLATIN 140 MILLIGRAM(S): 5 INJECTION, SOLUTION INTRAVENOUS at 16:18

## 2024-05-01 RX ADMIN — PALONOSETRON HYDROCHLORIDE 0.25 MILLIGRAM(S): 0.25 INJECTION, SOLUTION INTRAVENOUS at 14:02

## 2024-05-01 RX ADMIN — OXALIPLATIN 140 MILLIGRAM(S): 5 INJECTION, SOLUTION INTRAVENOUS at 13:40

## 2024-05-01 RX ADMIN — Medication 750 MILLIGRAM(S): at 16:18

## 2024-05-01 RX ADMIN — FOSAPREPITANT DIMEGLUMINE 150 MILLIGRAM(S): 150 INJECTION, POWDER, LYOPHILIZED, FOR SOLUTION INTRAVENOUS at 13:40

## 2024-05-01 RX ADMIN — FLUOROURACIL 4530 MILLIGRAM(S): 50 INJECTION, SOLUTION INTRAVENOUS at 16:03

## 2024-05-01 RX ADMIN — Medication 202 MILLIGRAM(S): at 13:45

## 2024-05-01 RX ADMIN — BEVACIZUMAB 385 MILLIGRAM(S): 400 INJECTION, SOLUTION INTRAVENOUS at 12:53

## 2024-05-01 RX ADMIN — Medication 204 MILLIGRAM(S): at 13:03

## 2024-05-01 NOTE — PHARMACY COMMUNICATION NOTE - COMMENTS
Spoke with Dr Peña via phone. She is clearing patient with ANC of 0.4 (cut off parameter is 1). She is removing 5FU bolus from regimen for today and will give patient Neulasta (waiting on insurance auth) on Friday.

## 2024-05-02 PROBLEM — R23.8 SKIN IRRITATION: Status: ACTIVE | Noted: 2024-05-02

## 2024-05-02 RX ORDER — PEGFILGRASTIM-CBQV 6 MG/.6ML
6 INJECTION, SOLUTION SUBCUTANEOUS ONCE
Refills: 0 | Status: COMPLETED | OUTPATIENT
Start: 2024-05-03 | End: 2024-05-03

## 2024-05-02 NOTE — HISTORY OF PRESENT ILLNESS
[de-identified] : Romy Oquendo is a 58 year old female here for f/u of metastatic colon cancer.  Limited records available  Oncologic history: 1. metastatic colon cancer --original diagnosis in 2018.  s/p sigmoid colon resection for a [id number] cancer on 7/11/2018 at [facility] with Dr Jimenez. --received adjuvant chemo (? folfox?) --liver metastases (x 2) identified in 12/2021.  went on chemo with Folfiri + cetuximab with Dr Oziel Paulson.  Partial response by PET.  Partial liver resection 10/6/22.  two lesions, both adenocarcinoma consistent with colonic primary.  MMR intact.  margins negative.  characterized by pathology as "minimal/no treatment response."   --NGS on the liver metastases from 2022:  BRCA-2 frameshift mutation (pathogenic);  [id number] [id number];  p53.  DEJUAN, TMB low. --received more chemo "adjuvant" unclear exactly what  --10/2023:  found to have recurrent cancer in the liver + possible peritoneal recurrence + ovarian mass --s/p diagnostic laparoscopy ([facility]) + L oophorectomy ([facility]).  No peritoneal carcinomatosis.  Ovary with endometrioid adenocarcinoma, moderately differentiated, arising from a background of endometriosis in the L ovary/fallopian tube.  1.8 cm tumor.   --followed by robotic hysterectomy + R salpingo-oophorectomy, and extended liver resection on 11/9/23.  Pathology from the liver consistent with metastatic colon cancer.  Pathology from the GYN specimens benign --PET scan 2/27/24:  Extensive metastatic malignancy.   multiple new hypermetabolic low attenuation lesions in the liver, c/w new liver metastases, New hypermetabolic mesenteric lymph nodes inferior to the liver, New hypermetabolic subcutaneous, intramuscular, intraperitoneal nodules associated with the anterior abdominal wall, Multiple new b/l pulm nodules, the larger ones are hypermetabolic on pet, New hypermetabolic L hilar lymphadenopathy, Increase in size and intensity of R cardiophrenic lymphadenopathy -- started Folfox + bevacizumab on 4/16//24  2. endometrial adenocarcinoma - stage IA- diagnosed 10/2023 - as above.  no adjuvant therapy  3.  BRCA-2 pathogenic mutation --s/p hysterectomy + BSO.  PMH, PSH reviewed in the chart [facility] - mother with ovarian cancer, MDS. SH- lives in [place].  Single. no children.  reports good support from family, friends.  nonsmoker, denies regular ETOH [de-identified] : Here for cycle 2 Folfox + bevacizumab.  she tried Tramadol due to inability to get Tylenol for abdominal pain but she did not tolerate Tramadol so stopped it. She is taking Tylenol every 4-5 hours with some help.   she experienced mild cold sensitivity and some neuropathy for a few days after chemo and then resolved. eating is ok, no nausea, vomiting, constipation, or diarrhea. Denies neuropathy or blood in stool or urine.  she c/c mild itching at port site, reports she is allergic to Tegaderm.  otherwise she is feeling ok, Denies fever, SOB, chest pain, cough, or any new sites of pin.

## 2024-05-02 NOTE — PHYSICAL EXAM
[Restricted in physically strenuous activity but ambulatory and able to carry out work of a light or sedentary nature] : Status 1- Restricted in physically strenuous activity but ambulatory and able to carry out work of a light or sedentary nature, e.g., light house work, office work [Normal] : affect appropriate [de-identified] : right chemo port in place,  steri strips were removed at this visit and noted mild erythama. No tenderness or discharge.  [de-identified] : multiple surgical scars well healed.  No organomegaly.  soft and not distended.  there is a nodule at the superior aspect of the hepatectomy incision, just a little larger than a pea, solid, fixed, worrisome

## 2024-05-02 NOTE — ASSESSMENT
[FreeTextEntry1] : Romy Oquendo is a 58 year old female BRCA-2 pathogenic mutation carrier with a history of sigmoid colon cancer originally resected in 7/2018.  At that time she had stage III disease.  Received adjuvant chemotherapy (presumably with Folfox although unconfirmed) but unfortunately she was found to have hepatic metastatic disease in 12/2021.  She then underwent chemotherapy with Folfiri + cetuximab for several cycles, followed by liver resection in 10/2022.  Pathology confirmed metastatic colon cancer, MMR intact.  She received some chemotherapy after surgery (? what type?).  In 10/2023 was found to have recurrent liver metastasis + a L ovarian mass.  The L ovary and Fallopian tube was resected in 10/2023 and was pathologically a new tumor, endometrial adenocarcinoma arising in a focus of endometriosis.  Complete surgical staging w/ hysterectomy + R salpingo-oophorectomy did not reveal any additional disease, rendering this a stage IA endometrial cancer not in need of any further therapy.  The liver metastasis was resected in 11/2023.  She did not receive any post-operative chemotherapy.  PET scan from 2/27/24 shows widely metastatic cancer - lung mets, liver mets, adenopathy above and below the diaphragm, and abdominal wall/peritoneal nodules.   Transitioned care from her previous oncologist 4/2024 due to insurance change.  Started Folfox + bevacizumab on 4/16//24  Plan: 1. metastatic colon cancer --reviewed history with the patient at the first visit, limited records available and she was able to supplement some information --have been unable to get records from her previous oncologist. Will request records from previous oncologist.  --based on the information she has been able to provide and in the limited record available - suggested treatment with Folfox + bevacizumab.  --started Folfox + bevacizumab on 4/16//24, tolerated chemo well with manageable site effects. Labs reviewed, , otherwise looks good. She is feeling well and wants to continue chemo today. Will proceed with cycle 2 Folfox + bevacizumab but omit 5-FU bolus and add on pegfilgrastim to day 3 of this cycle. --trend CEA, 13.3 today from 19.3 on 4/2/24. Will repeat every 4 weeks.  --requested NGS to [facility] one from last liver resection specimen on 4/23/24, pending result  --will aim to clarify w/ records or discussion w/ previous oncologist Dr Paulson:  previous treatment;  what was last treatment and when delivered? any notable side effects/treatment modifications in the past?  Foundation one reports?  germline genetic testing? left massage for him to call back but unsuccessful. Will try again.   2. cancer related pain --tylenol #3 unavailable, she wishes to avoid oxycodone. Tried tramadol but stopped due to side effects including nausea and worsening drowsiness.  --taking tylenol every 4-5 hours, marijuana gummies bid with some help --refer her to Dr. Johnson for further pain management.    3. neutropenia 2/2 chemotherapy -- today, Hgb/plts WNL.  --will proceed with chemo w/o 5-FU bolus and add on Udenyca 6mg to day 3 of this cycle(due to insurance)  --maintain neutropenic precautions --advised to go to ED if any fever or sign of infections  4.skin irritation at port site --allergic to steri strips? and tegaderm   --advise neomycin ointment, OTC hydrocortisone cream for itching --will re-assess when she comes in for pump D/C.   5. BRCA-2 pathogenic mutation carrier --identified on tumor NGS from 2022.  unknown if she ever had germline genetic testing.   6, supportive care --she is aware SW involved as needed  plan discussed with Dr. Peña RTC for cycle 3 of Folfox + bevacizumab on 5/15 labs- CBC, CMP, U/A for bevacizumab.

## 2024-05-03 ENCOUNTER — APPOINTMENT (OUTPATIENT)
Dept: INFUSION THERAPY | Facility: CLINIC | Age: 59
End: 2024-05-03

## 2024-05-03 ENCOUNTER — OUTPATIENT (OUTPATIENT)
Dept: OUTPATIENT SERVICES | Facility: HOSPITAL | Age: 59
LOS: 1 days | End: 2024-05-03
Payer: COMMERCIAL

## 2024-05-03 VITALS
DIASTOLIC BLOOD PRESSURE: 63 MMHG | OXYGEN SATURATION: 96 % | TEMPERATURE: 99 F | WEIGHT: 169.09 LBS | RESPIRATION RATE: 18 BRPM | HEIGHT: 67 IN | HEART RATE: 75 BPM | SYSTOLIC BLOOD PRESSURE: 93 MMHG

## 2024-05-03 DIAGNOSIS — Z90.89 ACQUIRED ABSENCE OF OTHER ORGANS: Chronic | ICD-10-CM

## 2024-05-03 DIAGNOSIS — C18.9 MALIGNANT NEOPLASM OF COLON, UNSPECIFIED: ICD-10-CM

## 2024-05-03 DIAGNOSIS — Z98.890 OTHER SPECIFIED POSTPROCEDURAL STATES: Chronic | ICD-10-CM

## 2024-05-03 DIAGNOSIS — Z90.49 ACQUIRED ABSENCE OF OTHER SPECIFIED PARTS OF DIGESTIVE TRACT: Chronic | ICD-10-CM

## 2024-05-03 DIAGNOSIS — Z90.79 ACQUIRED ABSENCE OF OTHER GENITAL ORGAN(S): Chronic | ICD-10-CM

## 2024-05-03 PROCEDURE — 96372 THER/PROPH/DIAG INJ SC/IM: CPT

## 2024-05-03 RX ORDER — ONDANSETRON 8 MG/1
8 TABLET, FILM COATED ORAL ONCE
Refills: 0 | Status: COMPLETED | OUTPATIENT
Start: 2024-05-03 | End: 2024-05-03

## 2024-05-03 RX ORDER — SODIUM CHLORIDE 9 MG/ML
1000 INJECTION INTRAMUSCULAR; INTRAVENOUS; SUBCUTANEOUS ONCE
Refills: 0 | Status: COMPLETED | OUTPATIENT
Start: 2024-05-03 | End: 2024-05-03

## 2024-05-03 RX ADMIN — SODIUM CHLORIDE 1000 MILLILITER(S): 9 INJECTION INTRAMUSCULAR; INTRAVENOUS; SUBCUTANEOUS at 15:25

## 2024-05-03 RX ADMIN — PEGFILGRASTIM-CBQV 6 MILLIGRAM(S): 6 INJECTION, SOLUTION SUBCUTANEOUS at 15:26

## 2024-05-03 RX ADMIN — SODIUM CHLORIDE 10 MILLILITER(S): 9 INJECTION INTRAMUSCULAR; INTRAVENOUS; SUBCUTANEOUS at 15:55

## 2024-05-03 RX ADMIN — SODIUM CHLORIDE 1000 MILLILITER(S): 9 INJECTION INTRAMUSCULAR; INTRAVENOUS; SUBCUTANEOUS at 15:50

## 2024-05-03 RX ADMIN — ONDANSETRON 8 MILLIGRAM(S): 8 TABLET, FILM COATED ORAL at 15:54

## 2024-05-03 NOTE — CHART NOTE - NSCHARTNOTEFT_GEN_A_CORE
Patient presents to [facility] for 5FU disconnect and port flush. Also scheduled for Pegfilgrastim for neutropenia on labs this week (). She reports feeling weak and fatigued. Had some nausea after breakfast today and had very small episode of emesis. Denies any fevers, chills. Denies any focal infectious symptoms. In clinic on 5/1, she had some irritation and pruritis around her port which has now resolved.     Vitals significant for soft BP Patient presents to [facility] for 5FU disconnect and port flush. Also scheduled for Pegfilgrastim for neutropenia on labs this week (). She reports feeling weak and fatigued. Had some nausea after breakfast today and had very small episode of emesis. Denies any fevers, chills. Denies any focal infectious symptoms. In clinic on 5/1, she had some irritation and pruritis around her port which has now resolved.     Vitals significant for soft BP 93/63 however not far from her baseline which is typically 110s systolic. Afebrile. Will administer pegfilgrastim as planned and also give 1L IVF bolus. Very strict neutropenic precautions discussed. Discussed with Oncology team, Ayad FERNANDES.

## 2024-05-13 RX ORDER — LEUCOVORIN CALCIUM 5 MG
750 TABLET ORAL ONCE
Refills: 0 | Status: COMPLETED | OUTPATIENT
Start: 2024-05-15 | End: 2024-05-15

## 2024-05-13 RX ORDER — SODIUM CHLORIDE 9 MG/ML
10 INJECTION INTRAMUSCULAR; INTRAVENOUS; SUBCUTANEOUS ONCE
Refills: 0 | Status: COMPLETED | OUTPATIENT
Start: 2024-05-17 | End: 2024-05-17

## 2024-05-13 RX ORDER — FLUOROURACIL 50 MG/ML
4530 INJECTION, SOLUTION INTRAVENOUS ONCE
Refills: 0 | Status: COMPLETED | OUTPATIENT
Start: 2024-05-15 | End: 2024-05-15

## 2024-05-15 ENCOUNTER — OUTPATIENT (OUTPATIENT)
Dept: OUTPATIENT SERVICES | Facility: HOSPITAL | Age: 59
LOS: 1 days | End: 2024-05-15
Payer: COMMERCIAL

## 2024-05-15 ENCOUNTER — APPOINTMENT (OUTPATIENT)
Dept: PALLIATIVE MEDICINE | Facility: CLINIC | Age: 59
End: 2024-05-15
Payer: MEDICAID

## 2024-05-15 ENCOUNTER — APPOINTMENT (OUTPATIENT)
Dept: INFUSION THERAPY | Facility: CLINIC | Age: 59
End: 2024-05-15

## 2024-05-15 ENCOUNTER — APPOINTMENT (OUTPATIENT)
Dept: HEMATOLOGY ONCOLOGY | Facility: CLINIC | Age: 59
End: 2024-05-15
Payer: MEDICAID

## 2024-05-15 VITALS
DIASTOLIC BLOOD PRESSURE: 68 MMHG | HEIGHT: 67 IN | OXYGEN SATURATION: 97 % | HEART RATE: 72 BPM | TEMPERATURE: 98 F | RESPIRATION RATE: 17 BRPM | WEIGHT: 169.98 LBS | SYSTOLIC BLOOD PRESSURE: 108 MMHG

## 2024-05-15 VITALS
OXYGEN SATURATION: 98 % | SYSTOLIC BLOOD PRESSURE: 106 MMHG | HEART RATE: 76 BPM | TEMPERATURE: 98 F | DIASTOLIC BLOOD PRESSURE: 64 MMHG | RESPIRATION RATE: 16 BRPM

## 2024-05-15 DIAGNOSIS — Z90.89 ACQUIRED ABSENCE OF OTHER ORGANS: Chronic | ICD-10-CM

## 2024-05-15 DIAGNOSIS — Z98.890 OTHER SPECIFIED POSTPROCEDURAL STATES: Chronic | ICD-10-CM

## 2024-05-15 DIAGNOSIS — Z90.79 ACQUIRED ABSENCE OF OTHER GENITAL ORGAN(S): Chronic | ICD-10-CM

## 2024-05-15 DIAGNOSIS — C78.6 SECONDARY MALIGNANT NEOPLASM OF RETROPERITONEUM AND PERITONEUM: ICD-10-CM

## 2024-05-15 DIAGNOSIS — C18.9 MALIGNANT NEOPLASM OF COLON, UNSPECIFIED: ICD-10-CM

## 2024-05-15 DIAGNOSIS — Z71.89 OTHER SPECIFIED COUNSELING: ICD-10-CM

## 2024-05-15 DIAGNOSIS — R63.0 ANOREXIA: ICD-10-CM

## 2024-05-15 DIAGNOSIS — Z90.49 ACQUIRED ABSENCE OF OTHER SPECIFIED PARTS OF DIGESTIVE TRACT: Chronic | ICD-10-CM

## 2024-05-15 LAB
ALBUMIN SERPL ELPH-MCNC: 3.3 G/DL — SIGNIFICANT CHANGE UP (ref 3.3–5)
ALP SERPL-CCNC: 212 U/L — HIGH (ref 40–120)
ALT FLD-CCNC: 31 U/L — SIGNIFICANT CHANGE UP (ref 10–45)
ANION GAP SERPL CALC-SCNC: 9 MMOL/L — SIGNIFICANT CHANGE UP (ref 5–17)
APPEARANCE UR: CLEAR — SIGNIFICANT CHANGE UP
AST SERPL-CCNC: 35 U/L — SIGNIFICANT CHANGE UP (ref 10–40)
BILIRUB SERPL-MCNC: 0.6 MG/DL — SIGNIFICANT CHANGE UP (ref 0.2–1.2)
BILIRUB UR-MCNC: NEGATIVE — SIGNIFICANT CHANGE UP
BUN SERPL-MCNC: 17 MG/DL — SIGNIFICANT CHANGE UP (ref 7–23)
CALCIUM SERPL-MCNC: 9.9 MG/DL — SIGNIFICANT CHANGE UP (ref 8.4–10.5)
CHLORIDE SERPL-SCNC: 108 MMOL/L — SIGNIFICANT CHANGE UP (ref 96–108)
CO2 SERPL-SCNC: 26 MMOL/L — SIGNIFICANT CHANGE UP (ref 22–31)
COLOR SPEC: YELLOW — SIGNIFICANT CHANGE UP
CREAT SERPL-MCNC: 0.7 MG/DL — SIGNIFICANT CHANGE UP (ref 0.5–1.3)
DIFF PNL FLD: NEGATIVE — SIGNIFICANT CHANGE UP
EGFR: 101 ML/MIN/1.73M2 — SIGNIFICANT CHANGE UP
GLUCOSE SERPL-MCNC: 112 MG/DL — HIGH (ref 70–99)
GLUCOSE UR QL: NEGATIVE MG/DL — SIGNIFICANT CHANGE UP
HCT VFR BLD CALC: 38.2 % — SIGNIFICANT CHANGE UP (ref 34.5–45)
HGB BLD-MCNC: 12.4 G/DL — SIGNIFICANT CHANGE UP (ref 11.5–15.5)
KETONES UR-MCNC: NEGATIVE MG/DL — SIGNIFICANT CHANGE UP
LEUKOCYTE ESTERASE UR-ACNC: NEGATIVE — SIGNIFICANT CHANGE UP
LYMPHOCYTES # BLD AUTO: 15 % — SIGNIFICANT CHANGE UP (ref 13–44)
LYMPHOCYTES # BLD AUTO: 4.3 K/UL — HIGH (ref 1–3.3)
MCHC RBC-ENTMCNC: 28.9 PG — SIGNIFICANT CHANGE UP (ref 27–34)
MCHC RBC-ENTMCNC: 32.5 GM/DL — SIGNIFICANT CHANGE UP (ref 32–36)
MCV RBC AUTO: 89 FL — SIGNIFICANT CHANGE UP (ref 80–100)
NEUTROPHILS # BLD AUTO: 23.5 K/UL — HIGH (ref 1.8–7.4)
NEUTROPHILS NFR BLD AUTO: 81.6 % — HIGH (ref 43–77)
NITRITE UR-MCNC: NEGATIVE — SIGNIFICANT CHANGE UP
PH UR: 6 — SIGNIFICANT CHANGE UP (ref 5–8)
PLATELET # BLD AUTO: 129 K/UL — LOW (ref 150–400)
POTASSIUM SERPL-MCNC: 5 MMOL/L — SIGNIFICANT CHANGE UP (ref 3.5–5.3)
POTASSIUM SERPL-SCNC: 5 MMOL/L — SIGNIFICANT CHANGE UP (ref 3.5–5.3)
PROT SERPL-MCNC: 7.4 G/DL — SIGNIFICANT CHANGE UP (ref 6–8.3)
PROT UR-MCNC: NEGATIVE MG/DL — SIGNIFICANT CHANGE UP
RBC # BLD: 4.29 M/UL — SIGNIFICANT CHANGE UP (ref 3.8–5.2)
RBC # FLD: 17.6 % — HIGH (ref 10.3–14.5)
SODIUM SERPL-SCNC: 143 MMOL/L — SIGNIFICANT CHANGE UP (ref 135–145)
SP GR SPEC: 1.02 — SIGNIFICANT CHANGE UP (ref 1–1.03)
UROBILINOGEN FLD QL: 1 MG/DL — SIGNIFICANT CHANGE UP (ref 0.2–1)
WBC # BLD: 28.8 K/UL — HIGH (ref 3.8–10.5)
WBC # FLD AUTO: 28.8 K/UL — HIGH (ref 3.8–10.5)

## 2024-05-15 PROCEDURE — 85025 COMPLETE CBC W/AUTO DIFF WBC: CPT

## 2024-05-15 PROCEDURE — 96417 CHEMO IV INFUS EACH ADDL SEQ: CPT

## 2024-05-15 PROCEDURE — 96416 CHEMO PROLONG INFUSE W/PUMP: CPT

## 2024-05-15 PROCEDURE — 99205 OFFICE O/P NEW HI 60 MIN: CPT

## 2024-05-15 PROCEDURE — 96411 CHEMO IV PUSH ADDL DRUG: CPT

## 2024-05-15 PROCEDURE — 96367 TX/PROPH/DG ADDL SEQ IV INF: CPT

## 2024-05-15 PROCEDURE — 81003 URINALYSIS AUTO W/O SCOPE: CPT

## 2024-05-15 PROCEDURE — 80053 COMPREHEN METABOLIC PANEL: CPT

## 2024-05-15 PROCEDURE — 96375 TX/PRO/DX INJ NEW DRUG ADDON: CPT

## 2024-05-15 PROCEDURE — 36415 COLL VENOUS BLD VENIPUNCTURE: CPT

## 2024-05-15 PROCEDURE — 96413 CHEMO IV INFUSION 1 HR: CPT

## 2024-05-15 PROCEDURE — 99213 OFFICE O/P EST LOW 20 MIN: CPT

## 2024-05-15 RX ORDER — BEVACIZUMAB 400 MG/16ML
385 INJECTION, SOLUTION INTRAVENOUS ONCE
Refills: 0 | Status: COMPLETED | OUTPATIENT
Start: 2024-05-15 | End: 2024-05-15

## 2024-05-15 RX ORDER — METOCLOPRAMIDE HCL 10 MG
10 TABLET ORAL ONCE
Refills: 0 | Status: COMPLETED | OUTPATIENT
Start: 2024-05-15 | End: 2024-05-15

## 2024-05-15 RX ORDER — TRAMADOL HYDROCHLORIDE 50 MG/1
50 TABLET, COATED ORAL
Qty: 60 | Refills: 0 | Status: DISCONTINUED | COMMUNITY
Start: 2024-04-09 | End: 2024-05-15

## 2024-05-15 RX ORDER — LIDOCAINE 4 G/100G
1 CREAM TOPICAL ONCE
Refills: 0 | Status: COMPLETED | OUTPATIENT
Start: 2024-05-15 | End: 2024-05-15

## 2024-05-15 RX ORDER — FAMOTIDINE 10 MG/ML
20 INJECTION INTRAVENOUS ONCE
Refills: 0 | Status: COMPLETED | OUTPATIENT
Start: 2024-05-15 | End: 2024-05-15

## 2024-05-15 RX ORDER — OXALIPLATIN 5 MG/ML
140 INJECTION, SOLUTION INTRAVENOUS ONCE
Refills: 0 | Status: COMPLETED | OUTPATIENT
Start: 2024-05-15 | End: 2024-05-15

## 2024-05-15 RX ORDER — FLUOROURACIL 50 MG/ML
750 INJECTION, SOLUTION INTRAVENOUS ONCE
Refills: 0 | Status: COMPLETED | OUTPATIENT
Start: 2024-05-15 | End: 2024-05-15

## 2024-05-15 RX ORDER — PALONOSETRON HYDROCHLORIDE 0.25 MG/5ML
0.25 INJECTION, SOLUTION INTRAVENOUS ONCE
Refills: 0 | Status: COMPLETED | OUTPATIENT
Start: 2024-05-15 | End: 2024-05-15

## 2024-05-15 RX ORDER — FOSAPREPITANT DIMEGLUMINE 150 MG/5ML
150 INJECTION, POWDER, LYOPHILIZED, FOR SOLUTION INTRAVENOUS ONCE
Refills: 0 | Status: COMPLETED | OUTPATIENT
Start: 2024-05-15 | End: 2024-05-15

## 2024-05-15 RX ORDER — DIPHENHYDRAMINE HCL 50 MG
25 CAPSULE ORAL ONCE
Refills: 0 | Status: COMPLETED | OUTPATIENT
Start: 2024-05-15 | End: 2024-05-15

## 2024-05-15 RX ORDER — DEXAMETHASONE 0.5 MG/5ML
10 ELIXIR ORAL ONCE
Refills: 0 | Status: COMPLETED | OUTPATIENT
Start: 2024-05-15 | End: 2024-05-15

## 2024-05-15 RX ADMIN — FOSAPREPITANT DIMEGLUMINE 150 MILLIGRAM(S): 150 INJECTION, POWDER, LYOPHILIZED, FOR SOLUTION INTRAVENOUS at 12:10

## 2024-05-15 RX ADMIN — Medication 750 MILLIGRAM(S): at 13:00

## 2024-05-15 RX ADMIN — FLUOROURACIL 4530 MILLIGRAM(S): 50 INJECTION, SOLUTION INTRAVENOUS at 15:25

## 2024-05-15 RX ADMIN — FLUOROURACIL 4530 MILLIGRAM(S): 50 INJECTION, SOLUTION INTRAVENOUS at 15:00

## 2024-05-15 RX ADMIN — OXALIPLATIN 140 MILLIGRAM(S): 5 INJECTION, SOLUTION INTRAVENOUS at 15:00

## 2024-05-15 RX ADMIN — OXALIPLATIN 140 MILLIGRAM(S): 5 INJECTION, SOLUTION INTRAVENOUS at 12:52

## 2024-05-15 RX ADMIN — FOSAPREPITANT DIMEGLUMINE 500 MILLIGRAM(S): 150 INJECTION, POWDER, LYOPHILIZED, FOR SOLUTION INTRAVENOUS at 11:36

## 2024-05-15 RX ADMIN — Medication 25 MILLIGRAM(S): at 12:47

## 2024-05-15 RX ADMIN — Medication 10 MILLIGRAM(S): at 12:30

## 2024-05-15 RX ADMIN — PALONOSETRON HYDROCHLORIDE 0.25 MILLIGRAM(S): 0.25 INJECTION, SOLUTION INTRAVENOUS at 11:03

## 2024-05-15 RX ADMIN — BEVACIZUMAB 385 MILLIGRAM(S): 400 INJECTION, SOLUTION INTRAVENOUS at 11:35

## 2024-05-15 RX ADMIN — BEVACIZUMAB 385 MILLIGRAM(S): 400 INJECTION, SOLUTION INTRAVENOUS at 11:01

## 2024-05-15 RX ADMIN — Medication 204 MILLIGRAM(S): at 12:15

## 2024-05-15 RX ADMIN — FAMOTIDINE 20 MILLIGRAM(S): 10 INJECTION INTRAVENOUS at 14:20

## 2024-05-15 RX ADMIN — Medication 10 MILLIGRAM(S): at 14:15

## 2024-05-15 RX ADMIN — Medication 750 MILLIGRAM(S): at 12:52

## 2024-05-15 RX ADMIN — FLUOROURACIL 90 MILLIGRAM(S): 50 INJECTION, SOLUTION INTRAVENOUS at 15:12

## 2024-05-15 RX ADMIN — Medication 202 MILLIGRAM(S): at 12:31

## 2024-05-15 NOTE — HISTORY OF PRESENT ILLNESS
[FreeTextEntry1] : 57 yo F with metastatic colon cancer here for palliative care support.  Onc Hx: 58 year old female BRCA-2 pathogenic mutation carrier with a history of sigmoid colon cancer originally resected in 2018. At that time she had stage III disease. Received adjuvant chemotherapy (presumably with Folfox although unconfirmed) but unfortunately she was found to have hepatic metastatic disease in 2021. She then underwent chemotherapy with Folfiri + cetuximab for several cycles, followed by liver resection in 10/2022. Pathology confirmed metastatic colon cancer, MMR intact. She received some chemotherapy after surgery (? what type?). In 10/2023 was found to have recurrent liver metastasis + a L ovarian mass. The L ovary and Fallopian tube was resected in 10/2023 and was pathologically a new tumor, endometrial adenocarcinoma arising in a focus of endometriosis. Complete surgical staging w/ hysterectomy + R salpingo-oophorectomy did not reveal any additional disease, rendering this a stage IA endometrial cancer not in need of any further therapy. The liver metastasis was resected in 2023. She did not receive any post-operative chemotherapy.  Providers: Oncologist - Danuta Peña  SYMPTOMS: #Pain Location - Lower abdomen Quality - Sharp, achy Radiation - None Timing - Intermittent Aggravating factors - None Minimal acceptable level (0-10 scale) - 3/10 Severity in last 24h (0-10 scale) - 6/10 Current score (0-10 scale) -  0/10 Stopped tramadol due to loopiness, nausea and drowsiness. Used tylenol #2 with codeine before which helped but was unable to get a refill from the pharmacy (they didn't have it available). Has used vicodin with good relief (tried her friend's vicodin), 5-325mg. Using it twice daily without any adverse effects. ISTOP Ref #: [id number]   #Poor appetite Has had intermittent weight loss during her history of cancer but most recently, cannabis gummies (takes 50mmg THC:CBD gummies) have been helpful in increasing her appetite. Feels she is eating well overall now.    ROS: If [ ] blank, symptom not present Fatigue [ ] Nausea [ ] Loss of appetite [X] Unintentional weight loss [ ] Constipation [ ] Diarrhea [ ] Anxiety [ ] Low mood [ ] Other symptoms: [x ] All other review of symptoms negative   SH: Lives in [place]. Single, no children.  Advanced Directives: HCP - Traci Chavez (friend)

## 2024-05-15 NOTE — ASSESSMENT
[FreeTextEntry1] : - 57 yo F with metastatic colon cancer here for palliative care support.  #Cancer-related pain - Hydrocodone-acetaminophen 5-325 PO q4h PRN - Amanda 2 tabs nightly  #Nausea - C/w cannabis gummies - C/w zofran 8mg ODT TID PRN  #ACP See separate GOC note - Full code - HCP: Traci Chavez (friend)  F/u in 2 weeks

## 2024-05-15 NOTE — PHYSICAL EXAM
[General Appearance - Alert] : alert [General Appearance - In No Acute Distress] : in no acute distress [Sclera] : the sclera and conjunctiva were normal [Extraocular Movements] : extraocular movements were intact [No Oral Pallor] : no oral pallor [Outer Ear] : the ears and nose were normal in appearance [Hearing Threshold Finger Rub Not [place]] : hearing was normal [Neck Appearance] : the appearance of the neck was normal [Neck Cervical Mass (___cm)] : no neck mass was observed [Respiration, Rhythm And Depth] : normal respiratory rhythm and effort [Auscultation Breath Sounds / Voice Sounds] : lungs were clear to auscultation bilaterally [Heart Rate And Rhythm] : heart rate was normal and rhythm regular [Heart Sounds] : normal S1 and S2 [Murmurs] : no murmurs [Edema] : there was no peripheral edema [Veins - Varicosity Changes] : there were no varicosital changes [Abdomen Soft] : soft [Abdomen Tenderness] : non-tender [Abdomen Mass (___ Cm)] : no abdominal mass palpated [Skin Color & Pigmentation] : normal skin color and pigmentation [Skin Turgor] : normal skin turgor [] : no rash [Oriented To Time, Place, And Person] : oriented to person, place, and time [Impaired Insight] : insight and judgment were intact [Affect] : the affect was normal

## 2024-05-17 ENCOUNTER — OUTPATIENT (OUTPATIENT)
Dept: OUTPATIENT SERVICES | Facility: HOSPITAL | Age: 59
LOS: 1 days | End: 2024-05-17

## 2024-05-17 ENCOUNTER — APPOINTMENT (OUTPATIENT)
Dept: INFUSION THERAPY | Facility: CLINIC | Age: 59
End: 2024-05-17

## 2024-05-17 VITALS
DIASTOLIC BLOOD PRESSURE: 76 MMHG | OXYGEN SATURATION: 98 % | HEART RATE: 86 BPM | SYSTOLIC BLOOD PRESSURE: 114 MMHG | RESPIRATION RATE: 16 BRPM | TEMPERATURE: 98 F

## 2024-05-17 DIAGNOSIS — Z98.890 OTHER SPECIFIED POSTPROCEDURAL STATES: Chronic | ICD-10-CM

## 2024-05-17 DIAGNOSIS — Z90.89 ACQUIRED ABSENCE OF OTHER ORGANS: Chronic | ICD-10-CM

## 2024-05-17 DIAGNOSIS — Z90.49 ACQUIRED ABSENCE OF OTHER SPECIFIED PARTS OF DIGESTIVE TRACT: Chronic | ICD-10-CM

## 2024-05-17 DIAGNOSIS — Z90.79 ACQUIRED ABSENCE OF OTHER GENITAL ORGAN(S): Chronic | ICD-10-CM

## 2024-05-17 DIAGNOSIS — C18.9 MALIGNANT NEOPLASM OF COLON, UNSPECIFIED: ICD-10-CM

## 2024-05-17 RX ADMIN — FLUOROURACIL 4530 MILLIGRAM(S): 50 INJECTION, SOLUTION INTRAVENOUS at 14:52

## 2024-05-19 PROBLEM — C78.6 MALIGNANT NEOPLASM METASTATIC TO PERITONEUM: Status: ACTIVE | Noted: 2024-04-02

## 2024-05-19 NOTE — ASSESSMENT
[FreeTextEntry1] : Romy Oquendo is a 59 year old female BRCA-2 pathogenic mutation carrier with a history of sigmoid colon cancer originally resected in 7/2018.  At that time she had stage III disease.  Received adjuvant chemotherapy (presumably with Folfox although unconfirmed) but unfortunately she was found to have hepatic metastatic disease in 12/2021.  She then underwent chemotherapy with Folfiri + cetuximab for several cycles, followed by liver resection in 10/2022.  Pathology confirmed metastatic colon cancer, MMR intact.  She received some chemotherapy after surgery (? what type?).  In 10/2023 was found to have recurrent liver metastasis + a L ovarian mass.  The L ovary and Fallopian tube was resected in 10/2023 and was pathologically a new tumor, endometrial adenocarcinoma arising in a focus of endometriosis.  Complete surgical staging w/ hysterectomy + R salpingo-oophorectomy did not reveal any additional disease, rendering this a stage IA endometrial cancer not in need of any further therapy.  The liver metastasis was resected in 11/2023.  She did not receive any post-operative chemotherapy.  PET scan from 2/27/24 shows widely metastatic cancer - lung mets, liver mets, adenopathy above and below the diaphragm, and abdominal wall/peritoneal nodules.   Transitioned care from her previous oncologist 4/2024 due to insurance change.  Started Folfox + bevacizumab on 4/16//24  Plan: 1. metastatic colon cancer --reviewed history with the patient at the first visit, limited records available and she was able to supplement some information --have been unable to get records from her previous oncologist.  --based on the information she has been able to provide and in the limited record available - suggested treatment with Folfox + bevacizumab.  --started Folfox + bevacizumab on 4/16//24, tolerating chemo well with manageable site effects. Labs reviewed  Will proceed with cycle 3 Folfox + bevacizumab but hold Udenyca due to ANC 23.0 today  --trend CEA, 13.3 today from 19.3 on 4/2/24. Will repeat every 4 weeks.  --requested NGS to [facility] from last liver resection specimen on 4/23/24, pending result  --tried to clarify w/ records or discussion w/ previous oncologist Dr Paulson:  previous treatment;  what was last treatment and when delivered? any notable side effects/treatment modifications in the past?  [facility] one reports?  germline genetic testing? left massage for him to call back but unsuccessful. --will repeat CT C/A/P after cycle 6 if tolerates.   2. cancer related pain --tylenol #3 unavailable, she wishes to avoid oxycodone. Tried tramadol but stopped due to side effects including nausea and worsening drowsiness.  --tylenol prn, tried Vicodin from her friend and marijuana gummies bid with some pain relief.  --she is going to see Dr. Johnson today.   3. h/o neutropenia 2/2 chemotherapy --ANC 23.0, will hold Udenyca for this cycle.   4. BRCA-2 pathogenic mutation carrier --identified on tumor NGS from 2022.  unknown if she ever had germline genetic testing.   RTC for cycle 4 of Folfox + bevacizumab on 5/29 labs- CBC, CMP, CEA, U/A for bevacizumab.

## 2024-05-19 NOTE — PHYSICAL EXAM
[Restricted in physically strenuous activity but ambulatory and able to carry out work of a light or sedentary nature] : Status 1- Restricted in physically strenuous activity but ambulatory and able to carry out work of a light or sedentary nature, e.g., light house work, office work [Normal] : affect appropriate [de-identified] : right chemo port in place,  no signs of infection [de-identified] : multiple surgical scars well healed.  No organomegaly.  soft and not distended.  there is a nodule at the superior aspect of the hepatectomy incision, just a little larger than a pea, solid, fixed, worrisome

## 2024-05-19 NOTE — HISTORY OF PRESENT ILLNESS
[de-identified] : Romy Oquendo is a 59 year old female here for f/u of metastatic colon cancer.  Limited records available  Oncologic history: 1. metastatic colon cancer --original diagnosis in 2018.  s/p sigmoid colon resection for a [id number] cancer on 7/11/2018 at [facility] with Dr Jimenez. --received adjuvant chemo (? folfox?) --liver metastases (x 2) identified in 12/2021.  went on chemo with Folfiri + cetuximab with Dr Oziel Paulson.  Partial response by PET.  Partial liver resection 10/6/22.  two lesions, both adenocarcinoma consistent with colonic primary.  MMR intact.  margins negative.  characterized by pathology as "minimal/no treatment response."   --NGS on the liver metastases from 2022:  BRCA-2 frameshift mutation (pathogenic);  PIK3CA [id number];  p53.  DEJUAN, TMB low. --received more chemo "adjuvant" unclear exactly what  --10/2023:  found to have recurrent cancer in the liver + possible peritoneal recurrence + ovarian mass --s/p diagnostic laparoscopy ([facility]) + L oophorectomy ([facility]).  No peritoneal carcinomatosis.  Ovary with endometrioid adenocarcinoma, moderately differentiated, arising from a background of endometriosis in the L ovary/fallopian tube.  1.8 cm tumor.   --followed by robotic hysterectomy + R salpingo-oophorectomy, and extended liver resection on 11/9/23.  Pathology from the liver consistent with metastatic colon cancer.  Pathology from the GYN specimens benign --PET scan 2/27/24:  Extensive metastatic malignancy.   multiple new hypermetabolic low attenuation lesions in the liver, c/w new liver metastases, New hypermetabolic mesenteric lymph nodes inferior to the liver, New hypermetabolic subcutaneous, intramuscular, intraperitoneal nodules associated with the anterior abdominal wall, Multiple new b/l pulm nodules, the larger ones are hypermetabolic on pet, New hypermetabolic L hilar lymphadenopathy, Increase in size and intensity of R cardiophrenic lymphadenopathy -- started Folfox + bevacizumab on 4/16//24  2. endometrial adenocarcinoma - stage IA- diagnosed 10/2023 - as above.  no adjuvant therapy  3.  BRCA-2 pathogenic mutation --s/p hysterectomy + BSO.  PMH, PSH reviewed in the chart [facility] - mother with ovarian cancer, MDS. SH- lives in [place].  Single. no children.  reports good support from family, friends.  nonsmoker, denies regular ETOH [de-identified] : Here for cycle 3 Folfox + bevacizumab.  + intermittent abdominal pain,  tried Vicodin from her friend with pain relief, taking gummies bid with help.  had mild cold sensitivity, some neuropathy, nausea, fatigue for a few days after chemo and then resolved, Today she is feeling fine,  eating is ok, no nausea, vomiting, constipation, or diarrhea. Denies neuropathy or blood in stool or urine, fever, SOB, chest pain, cough, or any new sites of pin.

## 2024-05-22 NOTE — REASON FOR VISIT
Comprehensive Intake Entered On:  7/17/2019 8:58 AM CDT    Performed On:  7/17/2019 8:54 AM CDT by Melanie Angulo CMA               Summary   Chief Complaint :   Left ankle injury; hurt last night during basketball game   Menstrual Status :   N/A   Weight Measured :   190.0 lb(Converted to: 190 lb 0 oz, 86.18 kg)    Height Measured :   73.5 in(Converted to: 6 ft 1 in, 186.69 cm)    Body Mass Index :   24.72 kg/m2   Body Surface Area :   2.11 m2   Systolic Blood Pressure :   118 mmHg   Diastolic Blood Pressure :   62 mmHg   Mean Arterial Pressure :   81 mmHg   Peripheral Pulse Rate :   70 bpm   BP Method :   Manual   HR Method :   Manual   Melanie Angulo CMA - 7/17/2019 8:54 AM CDT   Health Status   Allergies Verified? :   Yes   Medication History Verified? :   Yes   Medical History Verified? :   Yes   Pre-Visit Planning Status :   Completed   Melanie Angulo CMA - 7/17/2019 8:54 AM CDT   Consents   Consent for Immunization Exchange :   Consent Granted   Consent for Immunizations to Providers :   Consent Granted   Melanie Angulo CMA - 7/17/2019 8:54 AM CDT   Meds / Allergies   (As Of: 7/17/2019 8:58:09 AM CDT)   Allergies (Active)   No Known Medication Allergies  Estimated Onset Date:   Unspecified ; Created By:   Melanie Angulo CMA; Reaction Status:   Active ; Category:   Drug ; Substance:   No Known Medication Allergies ; Type:   Allergy ; Updated By:   Melanie Angulo CMA; Reviewed Date:   7/17/2019 8:54 AM CDT        Medication List   (As Of: 7/17/2019 8:58:09 AM CDT)   No Known Home Medications     Melanie Angulo CMA - 7/17/2019 8:56:40 AM      Prescription/Discharge Order    azithromycin  :   azithromycin ; Status:   Completed ; Ordered As Mnemonic:   azithromycin 250 mg oral tablet ; Simple Display Line:   1 packet(s), PO, Once, as directed on package labeling, 6 tab(s), 0 Refill(s) ; Ordering Provider:   Isma Gonzalez PA-C; Catalog Code:   azithromycin ; Order Dt/Tm:   11/30/2017 10:28:11 AM   [Follow-Up Visit] : a follow-up

## 2024-05-28 DIAGNOSIS — C78.00 SECONDARY MALIGNANT NEOPLASM OF UNSPECIFIED LUNG: ICD-10-CM

## 2024-05-28 RX ORDER — LEUCOVORIN CALCIUM 5 MG
750 TABLET ORAL ONCE
Refills: 0 | Status: COMPLETED | OUTPATIENT
Start: 2024-05-29 | End: 2024-05-29

## 2024-05-28 RX ORDER — FLUOROURACIL 50 MG/ML
750 INJECTION, SOLUTION INTRAVENOUS ONCE
Refills: 0 | Status: COMPLETED | OUTPATIENT
Start: 2024-05-29 | End: 2024-05-29

## 2024-05-28 RX ORDER — OXALIPLATIN 5 MG/ML
140 INJECTION, SOLUTION INTRAVENOUS ONCE
Refills: 0 | Status: COMPLETED | OUTPATIENT
Start: 2024-05-29 | End: 2024-05-29

## 2024-05-28 RX ORDER — FLUOROURACIL 50 MG/ML
4530 INJECTION, SOLUTION INTRAVENOUS ONCE
Refills: 0 | Status: COMPLETED | OUTPATIENT
Start: 2024-05-29 | End: 2024-05-29

## 2024-05-29 ENCOUNTER — APPOINTMENT (OUTPATIENT)
Dept: HEMATOLOGY ONCOLOGY | Facility: CLINIC | Age: 59
End: 2024-05-29
Payer: MEDICAID

## 2024-05-29 ENCOUNTER — OUTPATIENT (OUTPATIENT)
Dept: OUTPATIENT SERVICES | Facility: HOSPITAL | Age: 59
LOS: 1 days | End: 2024-05-29
Payer: COMMERCIAL

## 2024-05-29 ENCOUNTER — LABORATORY RESULT (OUTPATIENT)
Age: 59
End: 2024-05-29

## 2024-05-29 ENCOUNTER — APPOINTMENT (OUTPATIENT)
Dept: PALLIATIVE MEDICINE | Facility: CLINIC | Age: 59
End: 2024-05-29
Payer: MEDICAID

## 2024-05-29 ENCOUNTER — APPOINTMENT (OUTPATIENT)
Dept: INFUSION THERAPY | Facility: CLINIC | Age: 59
End: 2024-05-29

## 2024-05-29 VITALS
HEART RATE: 98 BPM | TEMPERATURE: 98 F | WEIGHT: 164.91 LBS | HEIGHT: 67 IN | RESPIRATION RATE: 17 BRPM | SYSTOLIC BLOOD PRESSURE: 132 MMHG | DIASTOLIC BLOOD PRESSURE: 82 MMHG | OXYGEN SATURATION: 98 %

## 2024-05-29 VITALS
HEART RATE: 105 BPM | RESPIRATION RATE: 18 BRPM | DIASTOLIC BLOOD PRESSURE: 82 MMHG | HEIGHT: 67 IN | WEIGHT: 165 LBS | TEMPERATURE: 98.1 F | BODY MASS INDEX: 25.9 KG/M2 | SYSTOLIC BLOOD PRESSURE: 132 MMHG | OXYGEN SATURATION: 97 %

## 2024-05-29 VITALS
SYSTOLIC BLOOD PRESSURE: 116 MMHG | TEMPERATURE: 98 F | RESPIRATION RATE: 18 BRPM | HEART RATE: 88 BPM | OXYGEN SATURATION: 98 % | DIASTOLIC BLOOD PRESSURE: 78 MMHG

## 2024-05-29 DIAGNOSIS — Z98.890 OTHER SPECIFIED POSTPROCEDURAL STATES: Chronic | ICD-10-CM

## 2024-05-29 DIAGNOSIS — Z90.79 ACQUIRED ABSENCE OF OTHER GENITAL ORGAN(S): Chronic | ICD-10-CM

## 2024-05-29 DIAGNOSIS — D70.1 AGRANULOCYTOSIS SECONDARY TO CANCER CHEMOTHERAPY: ICD-10-CM

## 2024-05-29 DIAGNOSIS — Z90.89 ACQUIRED ABSENCE OF OTHER ORGANS: Chronic | ICD-10-CM

## 2024-05-29 DIAGNOSIS — T45.1X5A AGRANULOCYTOSIS SECONDARY TO CANCER CHEMOTHERAPY: ICD-10-CM

## 2024-05-29 DIAGNOSIS — C25.9 MALIGNANT NEOPLASM OF PANCREAS, UNSPECIFIED: ICD-10-CM

## 2024-05-29 DIAGNOSIS — Z51.5 ENCOUNTER FOR PALLIATIVE CARE: ICD-10-CM

## 2024-05-29 DIAGNOSIS — Z90.49 ACQUIRED ABSENCE OF OTHER SPECIFIED PARTS OF DIGESTIVE TRACT: Chronic | ICD-10-CM

## 2024-05-29 LAB
ALBUMIN SERPL ELPH-MCNC: 3.3 G/DL
ALP BLD-CCNC: 166 U/L
ALT SERPL-CCNC: 27 U/L
ANION GAP SERPL CALC-SCNC: 9 MMOL/L
AST SERPL-CCNC: 35 U/L
BILIRUB SERPL-MCNC: 0.7 MG/DL
BUN SERPL-MCNC: 15 MG/DL
CALCIUM SERPL-MCNC: 9.6 MG/DL
CEA SERPL-MCNC: 12.1 NG/ML
CHLORIDE SERPL-SCNC: 108 MMOL/L
CO2 SERPL-SCNC: 25 MMOL/L
CREAT SERPL-MCNC: 0.6 MG/DL
EGFR: 105 ML/MIN/1.73M2
GLUCOSE SERPL-MCNC: 116 MG/DL
POTASSIUM SERPL-SCNC: 4.7 MMOL/L
PROT SERPL-MCNC: 6.7 G/DL
SODIUM SERPL-SCNC: 142 MMOL/L

## 2024-05-29 PROCEDURE — 96375 TX/PRO/DX INJ NEW DRUG ADDON: CPT

## 2024-05-29 PROCEDURE — 96417 CHEMO IV INFUS EACH ADDL SEQ: CPT

## 2024-05-29 PROCEDURE — 96367 TX/PROPH/DG ADDL SEQ IV INF: CPT

## 2024-05-29 PROCEDURE — 96413 CHEMO IV INFUSION 1 HR: CPT

## 2024-05-29 PROCEDURE — 96411 CHEMO IV PUSH ADDL DRUG: CPT

## 2024-05-29 PROCEDURE — 99215 OFFICE O/P EST HI 40 MIN: CPT

## 2024-05-29 PROCEDURE — 96416 CHEMO PROLONG INFUSE W/PUMP: CPT

## 2024-05-29 PROCEDURE — 99215 OFFICE O/P EST HI 40 MIN: CPT | Mod: 25

## 2024-05-29 RX ORDER — PROCHLORPERAZINE MALEATE 5 MG
10 TABLET ORAL ONCE
Refills: 0 | Status: COMPLETED | OUTPATIENT
Start: 2024-05-29 | End: 2024-05-29

## 2024-05-29 RX ORDER — BEVACIZUMAB 400 MG/16ML
385 INJECTION, SOLUTION INTRAVENOUS ONCE
Refills: 0 | Status: COMPLETED | OUTPATIENT
Start: 2024-05-29 | End: 2024-05-29

## 2024-05-29 RX ORDER — PALONOSETRON HYDROCHLORIDE 0.25 MG/5ML
0.25 INJECTION, SOLUTION INTRAVENOUS ONCE
Refills: 0 | Status: COMPLETED | OUTPATIENT
Start: 2024-05-29 | End: 2024-05-29

## 2024-05-29 RX ORDER — LIDOCAINE 4 G/100G
1 CREAM TOPICAL ONCE
Refills: 0 | Status: COMPLETED | OUTPATIENT
Start: 2024-05-29 | End: 2024-05-29

## 2024-05-29 RX ORDER — DIPHENHYDRAMINE HCL 50 MG
25 CAPSULE ORAL ONCE
Refills: 0 | Status: COMPLETED | OUTPATIENT
Start: 2024-05-29 | End: 2024-05-29

## 2024-05-29 RX ORDER — DEXAMETHASONE 0.5 MG/5ML
10 ELIXIR ORAL ONCE
Refills: 0 | Status: COMPLETED | OUTPATIENT
Start: 2024-05-29 | End: 2024-05-29

## 2024-05-29 RX ORDER — FOSAPREPITANT DIMEGLUMINE 150 MG/5ML
150 INJECTION, POWDER, LYOPHILIZED, FOR SOLUTION INTRAVENOUS ONCE
Refills: 0 | Status: COMPLETED | OUTPATIENT
Start: 2024-05-29 | End: 2024-05-29

## 2024-05-29 RX ADMIN — OXALIPLATIN 140 MILLIGRAM(S): 5 INJECTION, SOLUTION INTRAVENOUS at 15:25

## 2024-05-29 RX ADMIN — Medication 204 MILLIGRAM(S): at 13:57

## 2024-05-29 RX ADMIN — BEVACIZUMAB 385 MILLIGRAM(S): 400 INJECTION, SOLUTION INTRAVENOUS at 13:47

## 2024-05-29 RX ADMIN — FOSAPREPITANT DIMEGLUMINE 150 MILLIGRAM(S): 150 INJECTION, POWDER, LYOPHILIZED, FOR SOLUTION INTRAVENOUS at 15:18

## 2024-05-29 RX ADMIN — Medication 25 MILLIGRAM(S): at 14:44

## 2024-05-29 RX ADMIN — FLUOROURACIL 4530 MILLIGRAM(S): 50 INJECTION, SOLUTION INTRAVENOUS at 18:12

## 2024-05-29 RX ADMIN — Medication 10 MILLIGRAM(S): at 16:30

## 2024-05-29 RX ADMIN — FLUOROURACIL 90 MILLIGRAM(S): 50 INJECTION, SOLUTION INTRAVENOUS at 18:00

## 2024-05-29 RX ADMIN — BEVACIZUMAB 385 MILLIGRAM(S): 400 INJECTION, SOLUTION INTRAVENOUS at 13:17

## 2024-05-29 RX ADMIN — Medication 750 MILLIGRAM(S): at 15:31

## 2024-05-29 RX ADMIN — Medication 10 MILLIGRAM(S): at 14:12

## 2024-05-29 RX ADMIN — OXALIPLATIN 140 MILLIGRAM(S): 5 INJECTION, SOLUTION INTRAVENOUS at 17:50

## 2024-05-29 RX ADMIN — Medication 202 MILLIGRAM(S): at 14:29

## 2024-05-29 RX ADMIN — Medication 750 MILLIGRAM(S): at 17:50

## 2024-05-29 RX ADMIN — PALONOSETRON HYDROCHLORIDE 0.25 MILLIGRAM(S): 0.25 INJECTION, SOLUTION INTRAVENOUS at 13:57

## 2024-05-29 RX ADMIN — FOSAPREPITANT DIMEGLUMINE 500 MILLIGRAM(S): 150 INJECTION, POWDER, LYOPHILIZED, FOR SOLUTION INTRAVENOUS at 14:45

## 2024-05-29 NOTE — DISCHARGE INSTRUCTIONS: CHEMOTHERAPY - [id number]_A_AMB
[facility] ([phone])
CT negative for acute pathology.  Pt given copy of all results.  Will d/c with analgesia for breakthrough pain.  Will d/c

## 2024-05-29 NOTE — HISTORY OF PRESENT ILLNESS
[FreeTextEntry1] : 57 yo F with metastatic colon cancer here for palliative care support. Seen chairside.  Onc Hx: 58 year old female BRCA-2 pathogenic mutation carrier with a history of sigmoid colon cancer originally resected in 2018. At that time she had stage III disease. Received adjuvant chemotherapy (presumably with Folfox although unconfirmed) but unfortunately she was found to have hepatic metastatic disease in 2021. She then underwent chemotherapy with Folfiri + cetuximab for several cycles, followed by liver resection in 10/2022. Pathology confirmed metastatic colon cancer, MMR intact. She received some chemotherapy after surgery (? what type?). In 10/2023 was found to have recurrent liver metastasis + a L ovarian mass. The L ovary and Fallopian tube was resected in 10/2023 and was pathologically a new tumor, endometrial adenocarcinoma arising in a focus of endometriosis. Complete surgical staging w/ hysterectomy + R salpingo-oophorectomy did not reveal any additional disease, rendering this a stage IA endometrial cancer not in need of any further therapy. The liver metastasis was resected in 2023. She did not receive any post-operative chemotherapy.  Providers: Oncologist - Danuta Peña  Interval Hx: Feeling well overall, no new symptoms. Still having occasional pain but doesn't need pain medication every day.  SYMPTOMS: #Pain Location - Lower abdomen Quality - Sharp, achy Radiation - None Timing - Intermittent Aggravating factors - None Minimal acceptable level (0-10 scale) - 3/10 Severity in last 24h (0-10 scale) - 6/10 Current score (0-10 scale) -  0/10 Has used vicodin with good relief (tried her friend's vicodin), 5-325mg. Using it twice daily on average without any adverse effects. ISTOP Ref #: [id number]   #Poor appetite Has had intermittent weight loss during her history of cancer but most recently, cannabis gummies (takes 50mmg THC:CBD gummies) have been helpful in increasing her appetite. Feels she is eating well overall now.   ROS: If [ ] blank, symptom not present Fatigue [ ] Nausea [ ] Loss of appetite [X] Unintentional weight loss [ ] Constipation [ ] Diarrhea [ ] Anxiety [ ] Low mood [ ] Other symptoms: [x ] All other review of symptoms negative   SH: Lives in [place]. Single, no children.  Advanced Directives: HCP - Traci Chavez (friend)

## 2024-05-29 NOTE — ASSESSMENT
[FreeTextEntry1] : Romy Oquendo is a 59 year old female BRCA-2 pathogenic mutation carrier with a history of sigmoid colon cancer originally resected in 7/2018.  At that time she had stage III disease.  Received adjuvant chemotherapy (presumably with Folfox although unconfirmed) but unfortunately she was found to have hepatic metastatic disease in 12/2021.  She then underwent chemotherapy with Folfiri + cetuximab for several cycles, followed by liver resection in 10/2022.  Pathology confirmed metastatic colon cancer, MMR intact.  She received some chemotherapy after surgery (? what type?).  In 10/2023 was found to have recurrent liver metastasis + a L ovarian mass.  The L ovary and Fallopian tube was resected in 10/2023 and was pathologically a new tumor, endometrial adenocarcinoma arising in a focus of endometriosis.  Complete surgical staging w/ hysterectomy + R salpingo-oophorectomy did not reveal any additional disease, rendering this a stage IA endometrial cancer not in need of any further therapy.  The liver metastasis was resected in 11/2023.  She did not receive any post-operative chemotherapy.  PET scan from 2/27/24 shows widely metastatic cancer - lung mets, liver mets, adenopathy above and below the diaphragm, and abdominal wall/peritoneal nodules.   Transitioned care from her previous oncologist 4/2024 due to insurance change.  Started Folfox + bevacizumab on 4/16//24  Plan: 1. metastatic colon cancer --reviewed history with the patient at the first visit, limited records available and she was able to supplement some information.  have been unable to get records from her previous oncologist.  --started Folfox + bevacizumab on 4/16//24, tolerating chemo well with manageable site effects.  Prior to starting treatment she had a palpable nodule along the hepatectomy incision that correlated w/ one of the locations of the soft tissue metastases seen on the PET scan.  This is no longer palpable.  hopefully a surrogate marker for a response to treatment. --labs reviewed, OK for C4 of chemotherapy today.  Is neutropenic, will use udenyca this cycle. --trend CEA, was decreased at last visit. Will repeat every 4 weeks.  --will repeat PET-CT after cycle 6.  Since she has soft tissue metastases to track, PET would be a better imaging modality than CT. To R where last scan was done.  2. cancer related pain --prefers to use tylenol #3 but has been unavailable at numerous pharmacies.  She wishes to avoid oxycodone due to previous bad reaction.  Also had poor tolerance of tramadol (nausea, drowsiness).  has Rx for hydrocodone PRN which has been helpful as well as medical marijuana. --following w/ Dr Johnson  3. h/o neutropenia 2/2 chemotherapy --using udenyca intermittently w/ treatment.  will need this cycle.  4. BRCA-2 pathogenic mutation carrier --identified on tumor NGS from 2022.  unknown if she ever had germline genetic testing.  ? parp inhibitor in future if fails standard therapies?  RTC for cycle 5 of Folfox + bevacizumab on 6/12/24 labs- CBC, CMP

## 2024-05-29 NOTE — PHYSICAL EXAM
[Restricted in physically strenuous activity but ambulatory and able to carry out work of a light or sedentary nature] : Status 1- Restricted in physically strenuous activity but ambulatory and able to carry out work of a light or sedentary nature, e.g., light house work, office work [Normal] : normal appearance, no rash, nodules, vesicles, ulcers, erythema [de-identified] : right chemo port in place,  no signs of infection [de-identified] : multiple surgical scars well healed.  No organomegaly.  soft and not distended.  previously palpable nodule at the superior aspect of the hepatectomy incision is no longer palpable.  no palpable masses.  not tender.  not distended.   No

## 2024-05-29 NOTE — PHYSICAL EXAM
[General Appearance - Alert] : alert [General Appearance - In No Acute Distress] : in no acute distress [Sclera] : the sclera and conjunctiva were normal [Extraocular Movements] : extraocular movements were intact [No Oral Pallor] : no oral pallor [Outer Ear] : the ears and nose were normal in appearance [Hearing Threshold Finger Rub Not [place]] : hearing was normal [Neck Appearance] : the appearance of the neck was normal [Neck Cervical Mass (___cm)] : no neck mass was observed [Respiration, Rhythm And Depth] : normal respiratory rhythm and effort [Auscultation Breath Sounds / Voice Sounds] : lungs were clear to auscultation bilaterally [Heart Rate And Rhythm] : heart rate was normal and rhythm regular [Heart Sounds] : normal S1 and S2 [Murmurs] : no murmurs [Edema] : there was no peripheral edema [Veins - Varicosity Changes] : there were no varicosital changes [Abdomen Soft] : soft [Abdomen Tenderness] : non-tender [Abdomen Mass (___ Cm)] : no abdominal mass palpated [Skin Color & Pigmentation] : normal skin color and pigmentation [Skin Turgor] : normal skin turgor [] : no rash [Oriented To Time, Place, And Person] : oriented to person, place, and time [Impaired Insight] : insight and judgment were intact [Affect] : the affect was normal

## 2024-05-29 NOTE — ASSESSMENT
[FreeTextEntry1] : - 57 yo F with metastatic colon cancer here for palliative care support.  #Cancer-related pain - Hydrocodone-acetaminophen 5-325 PO q4h PRN - Amanda 2 tabs nightly  #Nausea - C/w cannabis gummies - C/w zofran 8mg ODT TID PRN  #ACP See separate GOC note - Full code - HCP: Traci Chavez (friend)  F/u as needed

## 2024-05-29 NOTE — HISTORY OF PRESENT ILLNESS
[de-identified] : Romy Oquendo is a 59 year old female here for f/u of metastatic colon cancer.  Oncologic history: 1. metastatic colon cancer --original diagnosis in 2018.  s/p sigmoid colon resection for a [id number] cancer on 7/11/2018 at [facility] with Dr Jimenez. --received adjuvant chemo (? folfox?) --liver metastases (x 2) identified in 12/2021.  went on chemo with Folfiri + cetuximab with Dr Oziel Paulson.  Partial response by PET.  Partial liver resection 10/6/22.  two lesions, both adenocarcinoma consistent with colonic primary.  MMR intact.  margins negative.  characterized by pathology as "minimal/no treatment response."   --NGS on the liver metastases from 2022:  BRCA-2 frameshift mutation (pathogenic);  PIK3CA [id number];  p53.  DEJUAN, TMB low. --received more chemo "adjuvant" unclear exactly what  --10/2023:  found to have recurrent cancer in the liver + possible peritoneal recurrence + ovarian mass --s/p diagnostic laparoscopy ([facility]) + L oophorectomy ([facility]).  No peritoneal carcinomatosis.  Ovary with endometrioid adenocarcinoma, moderately differentiated, arising from a background of endometriosis in the L ovary/fallopian tube.  1.8 cm tumor.   --followed by robotic hysterectomy + R salpingo-oophorectomy, and extended liver resection on 11/9/23.  Pathology from the liver consistent with metastatic colon cancer.  Pathology from the GYN specimens benign --PET scan 2/27/24:  Extensive metastatic malignancy.   multiple new hypermetabolic low attenuation lesions in the liver, c/w new liver metastases, New hypermetabolic mesenteric lymph nodes inferior to the liver, New hypermetabolic subcutaneous, intramuscular, intraperitoneal nodules associated with the anterior abdominal wall, Multiple new b/l pulm nodules, the larger ones are hypermetabolic on pet, New hypermetabolic L hilar lymphadenopathy, Increase in size and intensity of R cardiophrenic lymphadenopathy --foundation one NGS on liver metastasis resected in 11/2023:  DEJUAN.  TMB = 1 mut/mB.  KAYLA, BRAF WT.  mutations detected in APC, BRCA-2, PTEN, BTG1, CTCF, TP53 -- started Folfox + bevacizumab on 4/16/24  2. endometrial adenocarcinoma - stage IA- diagnosed 10/2023 - as above.  no adjuvant therapy  3.  BRCA-2 pathogenic mutation --s/p hysterectomy + BSO.  PMH, PSH reviewed in the chart [facility] - mother with ovarian cancer, MDS. SH- lives in [place].  Single. no children.  reports good support from family, friends.  nonsmoker, denies regular ETOH [de-identified] : Here for cycle 4 Folfox + bevacizumab.  tolerating treatment mostly well.  + fatigue where she spends most of the day resting from day 3-5.  but then bounces back. appetite up/down and lost a few lbs. pain has been better, rarely using hydrococodone-acetaminophen.  also medical marijuana. had mild cold sensitivity, some neuropathy,for a few days after chemo and then resolved denies numbness/tingling/dysesthesias at this visit. denies bleeding. bowels fluctuate btwn constipation/diarrhea. + nausea, controlled.  vomited in infusion room last treatment after something she ate but has not been experiencing emesis at home.

## 2024-05-29 NOTE — PHARMACY COMMUNICATION NOTE - COMMENTS
Per Ayad Franklin in Med Onc teams chat and via phone call, patient is cleared to receive FOLFOX/Mvasi today with ANC 0.6. Patient is to receive Udenyca on D3 (5/31/24). Benefits of treatment outweigh the risks.

## 2024-05-31 ENCOUNTER — APPOINTMENT (OUTPATIENT)
Dept: INFUSION THERAPY | Facility: CLINIC | Age: 59
End: 2024-05-31

## 2024-05-31 ENCOUNTER — OUTPATIENT (OUTPATIENT)
Dept: OUTPATIENT SERVICES | Facility: HOSPITAL | Age: 59
LOS: 1 days | End: 2024-05-31
Payer: COMMERCIAL

## 2024-05-31 VITALS
OXYGEN SATURATION: 96 % | DIASTOLIC BLOOD PRESSURE: 62 MMHG | TEMPERATURE: 98 F | HEART RATE: 71 BPM | SYSTOLIC BLOOD PRESSURE: 98 MMHG | RESPIRATION RATE: 18 BRPM

## 2024-05-31 DIAGNOSIS — Z98.890 OTHER SPECIFIED POSTPROCEDURAL STATES: Chronic | ICD-10-CM

## 2024-05-31 PROCEDURE — 96372 THER/PROPH/DIAG INJ SC/IM: CPT

## 2024-05-31 RX ORDER — PEGFILGRASTIM-CBQV 6 MG/.6ML
6 INJECTION, SOLUTION SUBCUTANEOUS ONCE
Refills: 0 | Status: COMPLETED | OUTPATIENT
Start: 2024-05-31 | End: 2024-05-31

## 2024-05-31 RX ORDER — SODIUM CHLORIDE 9 MG/ML
10 INJECTION INTRAMUSCULAR; INTRAVENOUS; SUBCUTANEOUS ONCE
Refills: 0 | Status: COMPLETED | OUTPATIENT
Start: 2024-05-31 | End: 2024-05-31

## 2024-05-31 RX ADMIN — PEGFILGRASTIM-CBQV 6 MILLIGRAM(S): 6 INJECTION, SOLUTION SUBCUTANEOUS at 17:01

## 2024-05-31 RX ADMIN — SODIUM CHLORIDE 10 MILLILITER(S): 9 INJECTION INTRAMUSCULAR; INTRAVENOUS; SUBCUTANEOUS at 17:00

## 2024-05-31 RX ADMIN — FLUOROURACIL 4530 MILLIGRAM(S): 50 INJECTION, SOLUTION INTRAVENOUS at 17:00

## 2024-06-04 ENCOUNTER — NON-APPOINTMENT (OUTPATIENT)
Age: 59
End: 2024-06-04

## 2024-06-04 RX ORDER — GABAPENTIN 300 MG/1
300 CAPSULE ORAL
Qty: 60 | Refills: 2 | Status: ACTIVE | COMMUNITY
Start: 2024-06-04 | End: 1900-01-01

## 2024-06-04 RX ORDER — PROCHLORPERAZINE MALEATE 10 MG/1
10 TABLET ORAL EVERY 8 HOURS
Qty: 60 | Refills: 4 | Status: ACTIVE | COMMUNITY
Start: 2024-06-04 | End: 1900-01-01

## 2024-06-04 RX ORDER — HYDROCODONE BITARTRATE AND ACETAMINOPHEN 5; 325 MG/1; MG/1
5-325 TABLET ORAL
Qty: 84 | Refills: 0 | Status: ACTIVE | COMMUNITY
Start: 2024-05-15 | End: 1900-01-01

## 2024-06-07 NOTE — PHYSICAL EXAM
[Normal] : Recto-Vaginal Exam: Normal [Fully active, able to carry on all pre-disease performance without restriction] : Status 0 - Fully active, able to carry on all pre-disease performance without restriction [de-identified] : dav diaz

## 2024-06-07 NOTE — DISCUSSION/SUMMARY
[Reviewed Clinical Lab Test(s)] : Results of clinical tests were reviewed. [Reviewed Radiology Report(s)] : Radiology reports were reviewed. [Reviewed Radiology Film/Image(s)] : Images from radiology studies were reviewed and examined. 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

## 2024-06-07 NOTE — HISTORY OF PRESENT ILLNESS
[FreeTextEntry1] : Problem 1) Metastatic Colon cancer to Liver 2) Endometrioid Adenocarcinoma of L ovary, stage 1A   Previous Therapy 1) LSO, omental lesion resection, peritoneal lesion resection 10/18/23   a) Endometrioid adenocarcinoma, moderately differentiated arising in background of endometriosis L ovary - no surface involvement. . Negative L fallopian tube, omentum, peritoneum.  2)s/p RA-TLH, RSO, Ureterolysis, flexible sigmoidoscopy, Extended resection of liver 23  a) metastatic adenocarcinoma of liver c/w colon primary    b) unremarkable uterus, fallopian tube, cervix, ovary and gallbladder  Here for surveillance visit. Feeling well Pathology c/w stage 1A endometrioid adenocarcinoma of L ovary and metastatic adenocarcinoma to liver. Did not see genetics since last visit.   Fhx: mother ovary at 43yo and  at 58 of MDS related to chemotherapy.  She was treated by Dr. Patten at [facility]. PMHx: colon cancer PSHs: colon resection 2018 and liver surgery 10/2022, lapsy LSO 10/2023 Social hx: lives in [place] alone,  for [employer].  Went to college in [place], did marijuana and gummies while on chemotherapy and does take it for pain. Not sexually active, lots of friends. She has significant financial stress.  She has no family but lots of friends.

## 2024-06-10 RX ORDER — LEUCOVORIN CALCIUM 5 MG
740 TABLET ORAL ONCE
Refills: 0 | Status: COMPLETED | OUTPATIENT
Start: 2024-06-12 | End: 2024-06-12

## 2024-06-12 ENCOUNTER — APPOINTMENT (OUTPATIENT)
Dept: INFUSION THERAPY | Facility: CLINIC | Age: 59
End: 2024-06-12

## 2024-06-12 ENCOUNTER — OUTPATIENT (OUTPATIENT)
Dept: OUTPATIENT SERVICES | Facility: HOSPITAL | Age: 59
LOS: 1 days | End: 2024-06-12
Payer: COMMERCIAL

## 2024-06-12 ENCOUNTER — APPOINTMENT (OUTPATIENT)
Dept: HEMATOLOGY ONCOLOGY | Facility: CLINIC | Age: 59
End: 2024-06-12
Payer: MEDICAID

## 2024-06-12 ENCOUNTER — LABORATORY RESULT (OUTPATIENT)
Age: 59
End: 2024-06-12

## 2024-06-12 VITALS
OXYGEN SATURATION: 97 % | RESPIRATION RATE: 18 BRPM | SYSTOLIC BLOOD PRESSURE: 127 MMHG | TEMPERATURE: 98 F | DIASTOLIC BLOOD PRESSURE: 83 MMHG | HEART RATE: 95 BPM | HEIGHT: 67 IN | WEIGHT: 167.99 LBS

## 2024-06-12 VITALS
HEART RATE: 89 BPM | SYSTOLIC BLOOD PRESSURE: 118 MMHG | RESPIRATION RATE: 18 BRPM | DIASTOLIC BLOOD PRESSURE: 74 MMHG | TEMPERATURE: 98 F | OXYGEN SATURATION: 98 %

## 2024-06-12 VITALS
HEIGHT: 67 IN | BODY MASS INDEX: 26.37 KG/M2 | WEIGHT: 168 LBS | OXYGEN SATURATION: 97 % | RESPIRATION RATE: 18 BRPM | TEMPERATURE: 98.5 F | HEART RATE: 119 BPM | DIASTOLIC BLOOD PRESSURE: 83 MMHG | SYSTOLIC BLOOD PRESSURE: 127 MMHG

## 2024-06-12 DIAGNOSIS — D69.59 OTHER SECONDARY THROMBOCYTOPENIA: ICD-10-CM

## 2024-06-12 DIAGNOSIS — G62.0 DRUG-INDUCED POLYNEUROPATHY: ICD-10-CM

## 2024-06-12 DIAGNOSIS — R11.2 NAUSEA WITH VOMITING, UNSPECIFIED: ICD-10-CM

## 2024-06-12 DIAGNOSIS — G89.3 NEOPLASM RELATED PAIN (ACUTE) (CHRONIC): ICD-10-CM

## 2024-06-12 DIAGNOSIS — T45.1X5A NAUSEA WITH VOMITING, UNSPECIFIED: ICD-10-CM

## 2024-06-12 DIAGNOSIS — Z98.890 OTHER SPECIFIED POSTPROCEDURAL STATES: Chronic | ICD-10-CM

## 2024-06-12 DIAGNOSIS — C18.9 MALIGNANT NEOPLASM OF COLON, UNSPECIFIED: ICD-10-CM

## 2024-06-12 DIAGNOSIS — Z90.79 ACQUIRED ABSENCE OF OTHER GENITAL ORGAN(S): Chronic | ICD-10-CM

## 2024-06-12 DIAGNOSIS — T45.1X5A DRUG-INDUCED POLYNEUROPATHY: ICD-10-CM

## 2024-06-12 DIAGNOSIS — T45.1X5A OTHER SECONDARY THROMBOCYTOPENIA: ICD-10-CM

## 2024-06-12 LAB
ALBUMIN SERPL ELPH-MCNC: 3.4 G/DL
ALP BLD-CCNC: 206 U/L
ALT SERPL-CCNC: 52 U/L
ANION GAP SERPL CALC-SCNC: 4 MMOL/L
AST SERPL-CCNC: 55 U/L
BILIRUB SERPL-MCNC: 0.9 MG/DL
BUN SERPL-MCNC: 13 MG/DL
CALCIUM SERPL-MCNC: 10.1 MG/DL
CHLORIDE SERPL-SCNC: 111 MMOL/L
CO2 SERPL-SCNC: 26 MMOL/L
CREAT SERPL-MCNC: 0.8 MG/DL
EGFR: 86 ML/MIN/1.73M2
GLUCOSE SERPL-MCNC: 177 MG/DL
HCT VFR BLD CALC: 36.6 %
HGB BLD-MCNC: 11.7 G/DL
LYMPHOCYTES # BLD AUTO: 6.4 K/UL
LYMPHOCYTES NFR BLD AUTO: 19 %
MAN DIFF?: YES
MCHC RBC-ENTMCNC: 29.9 PG
MCHC RBC-ENTMCNC: 32 GM/DL
MCV RBC AUTO: 93.6 FL
NEUTROPHILS # BLD AUTO: 26.5 K/UL
NEUTROPHILS NFR BLD AUTO: 79 %
PLATELET # BLD AUTO: 100 K/UL
POTASSIUM SERPL-SCNC: 4.3 MMOL/L
PROT SERPL-MCNC: 7 G/DL
RBC # BLD: 3.91 M/UL
RBC # FLD: 22.4 %
SODIUM SERPL-SCNC: 141 MMOL/L
WBC # FLD AUTO: 33.6 K/UL

## 2024-06-12 PROCEDURE — 96375 TX/PRO/DX INJ NEW DRUG ADDON: CPT

## 2024-06-12 PROCEDURE — 96416 CHEMO PROLONG INFUSE W/PUMP: CPT

## 2024-06-12 PROCEDURE — 96411 CHEMO IV PUSH ADDL DRUG: CPT

## 2024-06-12 PROCEDURE — 96417 CHEMO IV INFUS EACH ADDL SEQ: CPT

## 2024-06-12 PROCEDURE — 99215 OFFICE O/P EST HI 40 MIN: CPT | Mod: 25

## 2024-06-12 PROCEDURE — 96367 TX/PROPH/DG ADDL SEQ IV INF: CPT

## 2024-06-12 PROCEDURE — 96413 CHEMO IV INFUSION 1 HR: CPT

## 2024-06-12 RX ORDER — FLUOROURACIL 50 MG/ML
740 INJECTION, SOLUTION INTRAVENOUS ONCE
Refills: 0 | Status: COMPLETED | OUTPATIENT
Start: 2024-06-12 | End: 2024-06-12

## 2024-06-12 RX ORDER — FLUOROURACIL 50 MG/ML
4460 INJECTION, SOLUTION INTRAVENOUS ONCE
Refills: 0 | Status: COMPLETED | OUTPATIENT
Start: 2024-06-12 | End: 2024-06-12

## 2024-06-12 RX ORDER — DEXAMETHASONE 0.5 MG/5ML
10 ELIXIR ORAL ONCE
Refills: 0 | Status: COMPLETED | OUTPATIENT
Start: 2024-06-12 | End: 2024-06-12

## 2024-06-12 RX ORDER — LIDOCAINE 4 G/100G
1 CREAM TOPICAL ONCE
Refills: 0 | Status: COMPLETED | OUTPATIENT
Start: 2024-06-12 | End: 2024-06-12

## 2024-06-12 RX ORDER — BEVACIZUMAB 400 MG/16ML
375 INJECTION, SOLUTION INTRAVENOUS ONCE
Refills: 0 | Status: COMPLETED | OUTPATIENT
Start: 2024-06-12 | End: 2024-06-12

## 2024-06-12 RX ORDER — OXALIPLATIN 5 MG/ML
140 INJECTION, SOLUTION INTRAVENOUS ONCE
Refills: 0 | Status: COMPLETED | OUTPATIENT
Start: 2024-06-12 | End: 2024-06-12

## 2024-06-12 RX ORDER — FOSAPREPITANT DIMEGLUMINE 150 MG/5ML
150 INJECTION, POWDER, LYOPHILIZED, FOR SOLUTION INTRAVENOUS ONCE
Refills: 0 | Status: COMPLETED | OUTPATIENT
Start: 2024-06-12 | End: 2024-06-12

## 2024-06-12 RX ORDER — DIPHENHYDRAMINE HCL 50 MG
25 CAPSULE ORAL ONCE
Refills: 0 | Status: COMPLETED | OUTPATIENT
Start: 2024-06-12 | End: 2024-06-12

## 2024-06-12 RX ORDER — PALONOSETRON HYDROCHLORIDE 0.25 MG/5ML
0.25 INJECTION, SOLUTION INTRAVENOUS ONCE
Refills: 0 | Status: COMPLETED | OUTPATIENT
Start: 2024-06-12 | End: 2024-06-12

## 2024-06-12 RX ADMIN — FLUOROURACIL 4460 MILLIGRAM(S): 50 INJECTION, SOLUTION INTRAVENOUS at 15:25

## 2024-06-12 RX ADMIN — OXALIPLATIN 140 MILLIGRAM(S): 5 INJECTION, SOLUTION INTRAVENOUS at 13:10

## 2024-06-12 RX ADMIN — Medication 740 MILLIGRAM(S): at 13:10

## 2024-06-12 RX ADMIN — Medication 202 MILLIGRAM(S): at 12:20

## 2024-06-12 RX ADMIN — PALONOSETRON HYDROCHLORIDE 0.25 MILLIGRAM(S): 0.25 INJECTION, SOLUTION INTRAVENOUS at 12:15

## 2024-06-12 RX ADMIN — Medication 10 MILLIGRAM(S): at 12:15

## 2024-06-12 RX ADMIN — OXALIPLATIN 140 MILLIGRAM(S): 5 INJECTION, SOLUTION INTRAVENOUS at 15:10

## 2024-06-12 RX ADMIN — FLUOROURACIL 88.8 MILLIGRAM(S): 50 INJECTION, SOLUTION INTRAVENOUS at 15:13

## 2024-06-12 RX ADMIN — FOSAPREPITANT DIMEGLUMINE 500 MILLIGRAM(S): 150 INJECTION, POWDER, LYOPHILIZED, FOR SOLUTION INTRAVENOUS at 12:35

## 2024-06-12 RX ADMIN — FOSAPREPITANT DIMEGLUMINE 150 MILLIGRAM(S): 150 INJECTION, POWDER, LYOPHILIZED, FOR SOLUTION INTRAVENOUS at 13:05

## 2024-06-12 RX ADMIN — BEVACIZUMAB 375 MILLIGRAM(S): 400 INJECTION, SOLUTION INTRAVENOUS at 12:00

## 2024-06-12 RX ADMIN — Medication 25 MILLIGRAM(S): at 12:35

## 2024-06-12 RX ADMIN — BEVACIZUMAB 375 MILLIGRAM(S): 400 INJECTION, SOLUTION INTRAVENOUS at 11:28

## 2024-06-12 RX ADMIN — Medication 204 MILLIGRAM(S): at 12:00

## 2024-06-12 RX ADMIN — Medication 740 MILLIGRAM(S): at 15:10

## 2024-06-12 NOTE — PHARMACY COMMUNICATION NOTE - COMMENTS
as per verbal comminication bewtween Dr Peña and Maria Guadalupe ([facility]), udenyca is to be held on 6/14/24 -ANC is 26.5 as of 6/12/24 and WBC = 33

## 2024-06-14 ENCOUNTER — OUTPATIENT (OUTPATIENT)
Dept: OUTPATIENT SERVICES | Facility: HOSPITAL | Age: 59
LOS: 1 days | End: 2024-06-14

## 2024-06-14 ENCOUNTER — APPOINTMENT (OUTPATIENT)
Dept: INFUSION THERAPY | Facility: CLINIC | Age: 59
End: 2024-06-14

## 2024-06-14 VITALS
WEIGHT: 164.91 LBS | HEIGHT: 67 IN | TEMPERATURE: 98 F | HEART RATE: 96 BPM | RESPIRATION RATE: 18 BRPM | DIASTOLIC BLOOD PRESSURE: 69 MMHG | SYSTOLIC BLOOD PRESSURE: 107 MMHG | OXYGEN SATURATION: 96 %

## 2024-06-14 DIAGNOSIS — Z90.79 ACQUIRED ABSENCE OF OTHER GENITAL ORGAN(S): Chronic | ICD-10-CM

## 2024-06-14 DIAGNOSIS — Z90.89 ACQUIRED ABSENCE OF OTHER ORGANS: Chronic | ICD-10-CM

## 2024-06-14 DIAGNOSIS — Z90.49 ACQUIRED ABSENCE OF OTHER SPECIFIED PARTS OF DIGESTIVE TRACT: Chronic | ICD-10-CM

## 2024-06-14 DIAGNOSIS — Z98.890 OTHER SPECIFIED POSTPROCEDURAL STATES: Chronic | ICD-10-CM

## 2024-06-14 DIAGNOSIS — C18.9 MALIGNANT NEOPLASM OF COLON, UNSPECIFIED: ICD-10-CM

## 2024-06-14 PROCEDURE — 96523 IRRIG DRUG DELIVERY DEVICE: CPT

## 2024-06-14 RX ORDER — SODIUM CHLORIDE 9 MG/ML
10 INJECTION INTRAMUSCULAR; INTRAVENOUS; SUBCUTANEOUS ONCE
Refills: 0 | Status: COMPLETED | OUTPATIENT
Start: 2024-06-14 | End: 2024-06-14

## 2024-06-14 RX ADMIN — SODIUM CHLORIDE 10 MILLILITER(S): 9 INJECTION INTRAMUSCULAR; INTRAVENOUS; SUBCUTANEOUS at 14:35

## 2024-06-14 RX ADMIN — FLUOROURACIL 4460 MILLIGRAM(S): 50 INJECTION, SOLUTION INTRAVENOUS at 14:30

## 2024-06-14 NOTE — DISCHARGE INSTRUCTIONS: CHEMOTHERAPY - DC SYMPTOM 3
Initiate Treatment: triamcinolone acetonide 0.1 % topical cream- Apply twice daily x 2 weeks to all affected areas on the body and then then only apply 2-3 times a week as needed for flares Render In Strict Bullet Format?: No Detail Level: Zone Signs of bleeding (nose bleeds, bleeding gums, blackened stool, unusual bruising)

## 2024-06-17 ENCOUNTER — NON-APPOINTMENT (OUTPATIENT)
Age: 59
End: 2024-06-17

## 2024-06-24 PROBLEM — G89.3 CANCER-RELATED PAIN: Status: ACTIVE | Noted: 2024-04-02

## 2024-06-24 PROBLEM — D69.59 CHEMOTHERAPY-INDUCED THROMBOCYTOPENIA: Status: ACTIVE | Noted: 2024-06-24

## 2024-06-24 PROBLEM — G62.0 CHEMOTHERAPY-INDUCED PERIPHERAL NEUROPATHY: Status: ACTIVE | Noted: 2024-06-04

## 2024-06-24 PROBLEM — R11.2 CHEMOTHERAPY INDUCED NAUSEA AND VOMITING: Status: ACTIVE | Noted: 2024-04-09

## 2024-06-24 NOTE — PHYSICAL EXAM
[Restricted in physically strenuous activity but ambulatory and able to carry out work of a light or sedentary nature] : Status 1- Restricted in physically strenuous activity but ambulatory and able to carry out work of a light or sedentary nature, e.g., light house work, office work [Normal] : affect appropriate [de-identified] : right chemo port in place,  no signs of infection [de-identified] : multiple surgical scars well healed.  No organomegaly.  soft and not distended.  previously palpable nodule at the superior aspect of the hepatectomy incision is no longer palpable.  no palpable masses.  not tender.  not distended.

## 2024-06-24 NOTE — ASSESSMENT
[FreeTextEntry1] : Romy Oquendo is a 59 year old female BRCA-2 pathogenic mutation carrier with a history of sigmoid colon cancer originally resected in 7/2018.  At that time she had stage III disease.  Received adjuvant chemotherapy (presumably with Folfox although unconfirmed) but unfortunately she was found to have hepatic metastatic disease in 12/2021.  She then underwent chemotherapy with Folfiri + cetuximab for several cycles, followed by liver resection in 10/2022.  Pathology confirmed metastatic colon cancer, MMR intact.  She received some chemotherapy after surgery (? what type?).  In 10/2023 was found to have recurrent liver metastasis + a L ovarian mass.  The L ovary and Fallopian tube was resected in 10/2023 and was pathologically a new tumor, endometrial adenocarcinoma arising in a focus of endometriosis.  Complete surgical staging w/ hysterectomy + R salpingo-oophorectomy did not reveal any additional disease, rendering this a stage IA endometrial cancer not in need of any further therapy.  The liver metastasis was resected in 11/2023.  She did not receive any post-operative chemotherapy.  PET scan from 2/27/24 shows widely metastatic cancer - lung mets, liver mets, adenopathy above and below the diaphragm, and abdominal wall/peritoneal nodules.   Transitioned care from her previous oncologist 4/2024 due to insurance change.  Started Folfox + bevacizumab on 4/16//24  Plan: #. metastatic colon cancer --reviewed history with the patient at the first visit, limited records available and she was able to supplement some information.  have been unable to get records from her previous oncologist.  --started Folfox + bevacizumab on 4/16//24, tolerating chemo well with manageable site effects.  Prior to starting treatment she had a palpable nodule along the hepatectomy incision that correlated w/ one of the locations of the soft tissue metastases seen on the PET scan.  This is no longer palpable.  hopefully a surrogate marker for a response to treatment. --labs reviewed, OK for C5 of chemotherapy today.   --using udenyca intermittently.  WBC 33, G-CSF not required this cycle. --trend CEA, was decreased at last visit. Will repeat every 4 weeks.  --will repeat PET-CT after cycle 6.  Since she has soft tissue metastases to track, PET would be a better imaging modality than CT. To [facility] where last scan was done.  Order provided and asked her to schedule in early July 2024.  #. cancer related pain --pt prefers to use tylenol #3 but has been unavailable at numerous pharmacies.  She wishes to avoid oxycodone due to previous bad reaction.  Also had poor tolerance of tramadol (nausea, drowsiness).  has Rx for hydrocodone PRN which has been helpful as well as medical marijuana. --following w/ Dr Johnson  #. h/o neutropenia 2/2 chemotherapy --using udenyca intermittently w/ treatment.  not needed this cycle.  #. BRCA-2 pathogenic mutation carrier --identified on tumor NGS from 2022.  unknown if she ever had germline genetic testing.  ? parp inhibitor in future if fails standard therapies?  # chemo induced N/V --does not get good relief from zofran.  has had emesis on day #1 of treatment.  adding reglan to chemo pre-meds this cycle.  # chemo induced neuropathy --monitor.  Back on gabapentin BID which she feels has been beneficial  She is traveling to [facility] 7/6 - 7/13.  Will need to adjust chemo dates accordingly.  C7 on 7/17/24  RTC for cycle 6 of Folfox + bevacizumab on 6/26/24 labs- CBC, CMP, CEA Normal for race

## 2024-06-24 NOTE — HISTORY OF PRESENT ILLNESS
[de-identified] : Romy Oquendo is a 59 year old female here for f/u of metastatic colon cancer.  Oncologic history: 1. metastatic colon cancer --original diagnosis in 2018.  s/p sigmoid colon resection for a [id number] cancer on 7/11/2018 at [facility] with Dr Jimenez. --received adjuvant chemo (? folfox?) --liver metastases (x 2) identified in 12/2021.  went on chemo with Folfiri + cetuximab with Dr Oziel Paulson.  Partial response by PET.  Partial liver resection 10/6/22.  two lesions, both adenocarcinoma consistent with colonic primary.  MMR intact.  margins negative.  characterized by pathology as "minimal/no treatment response."   --NGS on the liver metastases from 2022:  BRCA-2 frameshift mutation (pathogenic);  PIK3CA [id number];  p53.  DEJUAN, TMB low. --received more chemo "adjuvant" unclear exactly what  --10/2023:  found to have recurrent cancer in the liver + possible peritoneal recurrence + ovarian mass --s/p diagnostic laparoscopy ([facility]) + L oophorectomy ([facility]).  No peritoneal carcinomatosis.  Ovary with endometrioid adenocarcinoma, moderately differentiated, arising from a background of endometriosis in the L ovary/fallopian tube.  1.8 cm tumor.   --followed by robotic hysterectomy + R salpingo-oophorectomy, and extended liver resection on 11/9/23.  Pathology from the liver consistent with metastatic colon cancer.  Pathology from the GYN specimens benign --PET scan 2/27/24:  Extensive metastatic malignancy.   multiple new hypermetabolic low attenuation lesions in the liver, c/w new liver metastases, New hypermetabolic mesenteric lymph nodes inferior to the liver, New hypermetabolic subcutaneous, intramuscular, intraperitoneal nodules associated with the anterior abdominal wall, Multiple new b/l pulm nodules, the larger ones are hypermetabolic on pet, New hypermetabolic L hilar lymphadenopathy, Increase in size and intensity of R cardiophrenic lymphadenopathy --foundation one NGS on liver metastasis resected in 11/2023:  DEJUAN.  TMB = 1 mut/mB.  KAYLA, BRAF WT.  mutations detected in APC, BRCA-2, PTEN, BTG1, CTCF, TP53 -- started Folfox + bevacizumab on 4/16/24  2. endometrial adenocarcinoma - stage IA- diagnosed 10/2023 - as above.  no adjuvant therapy  3.  BRCA-2 pathogenic mutation --s/p hysterectomy + BSO.  PMH, PSH reviewed in the chart [facility] - mother with ovarian cancer, MDS. SH- lives in [place].  Single. no children.  reports good support from family, friends.  nonsmoker, denies regular ETOH [de-identified] : Here for cycle 5 Folfox + bevacizumab.  tolerating treatment mostly well.  + fatigue where she spends most of the day resting from day 3-5.  but then bounces back. appetite up/down but wt stable. pain has been better, rarely using hydrococodone-acetaminophen.  also medical marijuana. cold dysesthesias and tingling were worse, espec at night, so she resumed taking gabapentin. denies bleeding. bowels fluctuate btwn constipation/diarrhea. + nausea, controlled.  vomited in infusion room last treatment

## 2024-06-25 RX ORDER — SODIUM CHLORIDE 0.9 % (FLUSH) 0.9 %
10 SYRINGE (ML) INJECTION ONCE
Refills: 0 | Status: COMPLETED | OUTPATIENT
Start: 2024-06-28 | End: 2024-06-28

## 2024-06-25 RX ORDER — PEGFILGRASTIM-CBQV 6 MG/.6ML
6 INJECTION, SOLUTION SUBCUTANEOUS ONCE
Refills: 0 | Status: COMPLETED | OUTPATIENT
Start: 2024-06-28 | End: 2024-06-28

## 2024-06-26 ENCOUNTER — APPOINTMENT (OUTPATIENT)
Dept: INFUSION THERAPY | Facility: CLINIC | Age: 59
End: 2024-06-26

## 2024-06-26 ENCOUNTER — APPOINTMENT (OUTPATIENT)
Dept: HEMATOLOGY ONCOLOGY | Facility: CLINIC | Age: 59
End: 2024-06-26

## 2024-06-26 ENCOUNTER — OUTPATIENT (OUTPATIENT)
Dept: OUTPATIENT SERVICES | Facility: HOSPITAL | Age: 59
LOS: 1 days | End: 2024-06-26
Payer: COMMERCIAL

## 2024-06-26 ENCOUNTER — LABORATORY RESULT (OUTPATIENT)
Age: 59
End: 2024-06-26

## 2024-06-26 ENCOUNTER — NON-APPOINTMENT (OUTPATIENT)
Age: 59
End: 2024-06-26

## 2024-06-26 VITALS
TEMPERATURE: 98.6 F | DIASTOLIC BLOOD PRESSURE: 71 MMHG | SYSTOLIC BLOOD PRESSURE: 100 MMHG | HEIGHT: 67 IN | HEART RATE: 89 BPM | OXYGEN SATURATION: 99 % | BODY MASS INDEX: 26.84 KG/M2 | RESPIRATION RATE: 18 BRPM | WEIGHT: 171 LBS

## 2024-06-26 VITALS
WEIGHT: 171.08 LBS | SYSTOLIC BLOOD PRESSURE: 104 MMHG | TEMPERATURE: 99 F | HEART RATE: 89 BPM | RESPIRATION RATE: 16 BRPM | HEIGHT: 67 IN | DIASTOLIC BLOOD PRESSURE: 71 MMHG | OXYGEN SATURATION: 99 %

## 2024-06-26 DIAGNOSIS — Z90.49 ACQUIRED ABSENCE OF OTHER SPECIFIED PARTS OF DIGESTIVE TRACT: Chronic | ICD-10-CM

## 2024-06-26 DIAGNOSIS — C18.9 MALIGNANT NEOPLASM OF COLON, UNSPECIFIED: ICD-10-CM

## 2024-06-26 DIAGNOSIS — C78.7 MALIGNANT NEOPLASM OF COLON, UNSPECIFIED: ICD-10-CM

## 2024-06-26 DIAGNOSIS — Z98.890 OTHER SPECIFIED POSTPROCEDURAL STATES: Chronic | ICD-10-CM

## 2024-06-26 DIAGNOSIS — Z90.89 ACQUIRED ABSENCE OF OTHER ORGANS: Chronic | ICD-10-CM

## 2024-06-26 LAB
ALBUMIN SERPL ELPH-MCNC: 3.2 G/DL
ALP BLD-CCNC: 159 U/L
ALT SERPL-CCNC: 36 U/L
ANION GAP SERPL CALC-SCNC: 12 MMOL/L
AST SERPL-CCNC: 34 U/L
BILIRUB SERPL-MCNC: 0.9 MG/DL
BUN SERPL-MCNC: 14 MG/DL
CALCIUM SERPL-MCNC: 9.5 MG/DL
CHLORIDE SERPL-SCNC: 109 MMOL/L
CO2 SERPL-SCNC: 25 MMOL/L
CREAT SERPL-MCNC: 0.6 MG/DL
EGFR: 105 ML/MIN/1.73M2
GLUCOSE SERPL-MCNC: 121 MG/DL
POTASSIUM SERPL-SCNC: 4.5 MMOL/L
PROT SERPL-MCNC: 6.8 G/DL
SODIUM SERPL-SCNC: 146 MMOL/L

## 2024-06-26 PROCEDURE — 96413 CHEMO IV INFUSION 1 HR: CPT

## 2024-06-26 PROCEDURE — 96372 THER/PROPH/DIAG INJ SC/IM: CPT

## 2024-06-26 PROCEDURE — 96417 CHEMO IV INFUS EACH ADDL SEQ: CPT

## 2024-06-26 PROCEDURE — 96367 TX/PROPH/DG ADDL SEQ IV INF: CPT

## 2024-06-26 PROCEDURE — 99214 OFFICE O/P EST MOD 30 MIN: CPT | Mod: 25

## 2024-06-26 PROCEDURE — 96375 TX/PRO/DX INJ NEW DRUG ADDON: CPT

## 2024-06-26 PROCEDURE — 96411 CHEMO IV PUSH ADDL DRUG: CPT

## 2024-06-26 PROCEDURE — 96416 CHEMO PROLONG INFUSE W/PUMP: CPT

## 2024-06-26 RX ORDER — DEXAMETHASONE 1 MG/1
10 TABLET ORAL ONCE
Refills: 0 | Status: COMPLETED | OUTPATIENT
Start: 2024-06-26 | End: 2024-06-26

## 2024-06-26 RX ORDER — METOCLOPRAMIDE 5 MG/5ML
10 SOLUTION ORAL ONCE
Refills: 0 | Status: COMPLETED | OUTPATIENT
Start: 2024-06-26 | End: 2024-06-26

## 2024-06-26 RX ORDER — FLUOROURACIL 50 MG/ML
4460 INJECTION, SOLUTION INTRAVENOUS ONCE
Refills: 0 | Status: COMPLETED | OUTPATIENT
Start: 2024-06-26 | End: 2024-06-26

## 2024-06-26 RX ORDER — OXALIPLATIN 5 MG/ML
140 INJECTION, SOLUTION, CONCENTRATE INTRAVENOUS ONCE
Refills: 0 | Status: COMPLETED | OUTPATIENT
Start: 2024-06-26 | End: 2024-06-26

## 2024-06-26 RX ORDER — BEVACIZUMAB-MALY 400 MG/16ML
375 INJECTION, SOLUTION INTRAVENOUS ONCE
Refills: 0 | Status: COMPLETED | OUTPATIENT
Start: 2024-06-26 | End: 2024-06-26

## 2024-06-26 RX ORDER — FLUOROURACIL 50 MG/ML
740 INJECTION, SOLUTION INTRAVENOUS ONCE
Refills: 0 | Status: COMPLETED | OUTPATIENT
Start: 2024-06-26 | End: 2024-06-26

## 2024-06-26 RX ORDER — LIDOCAINE HCL 28 MG/G
1 GEL TOPICAL ONCE
Refills: 0 | Status: COMPLETED | OUTPATIENT
Start: 2024-06-26 | End: 2024-06-26

## 2024-06-26 RX ORDER — PALONOSETRON HYDROCHLORIDE 0.25 MG/5ML
0.25 INJECTION, SOLUTION INTRAVENOUS ONCE
Refills: 0 | Status: COMPLETED | OUTPATIENT
Start: 2024-06-26 | End: 2024-06-26

## 2024-06-26 RX ORDER — LEUCOVORIN CALCIUM 50 MG
740 VIAL (EA) INJECTION ONCE
Refills: 0 | Status: COMPLETED | OUTPATIENT
Start: 2024-06-26 | End: 2024-06-26

## 2024-06-26 RX ORDER — FOSAPREPITANT DIMEGLUMINE 150 MG/5ML
150 INJECTION, POWDER, LYOPHILIZED, FOR SOLUTION INTRAVENOUS ONCE
Refills: 0 | Status: COMPLETED | OUTPATIENT
Start: 2024-06-26 | End: 2024-06-26

## 2024-06-26 RX ORDER — DIPHENHYDRAMINE HCL 12.5MG/5ML
25 ELIXIR ORAL ONCE
Refills: 0 | Status: COMPLETED | OUTPATIENT
Start: 2024-06-26 | End: 2024-06-26

## 2024-06-26 RX ADMIN — PALONOSETRON HYDROCHLORIDE 0.25 MILLIGRAM(S): 0.25 INJECTION, SOLUTION INTRAVENOUS at 13:35

## 2024-06-26 RX ADMIN — FOSAPREPITANT DIMEGLUMINE 500 MILLIGRAM(S): 150 INJECTION, POWDER, LYOPHILIZED, FOR SOLUTION INTRAVENOUS at 12:26

## 2024-06-26 RX ADMIN — FLUOROURACIL 88.8 MILLIGRAM(S): 50 INJECTION, SOLUTION INTRAVENOUS at 15:45

## 2024-06-26 RX ADMIN — Medication 740 MILLIGRAM(S): at 15:43

## 2024-06-26 RX ADMIN — DEXAMETHASONE 204 MILLIGRAM(S): 1 TABLET ORAL at 13:00

## 2024-06-26 RX ADMIN — Medication 740 MILLIGRAM(S): at 13:43

## 2024-06-26 RX ADMIN — OXALIPLATIN 140 MILLIGRAM(S): 5 INJECTION, SOLUTION, CONCENTRATE INTRAVENOUS at 13:43

## 2024-06-26 RX ADMIN — METOCLOPRAMIDE 10 MILLIGRAM(S): 5 SOLUTION ORAL at 13:32

## 2024-06-26 RX ADMIN — FLUOROURACIL 4460 MILLIGRAM(S): 50 INJECTION, SOLUTION INTRAVENOUS at 16:00

## 2024-06-26 RX ADMIN — OXALIPLATIN 140 MILLIGRAM(S): 5 INJECTION, SOLUTION, CONCENTRATE INTRAVENOUS at 15:43

## 2024-06-26 RX ADMIN — Medication 25 MILLIGRAM(S): at 13:31

## 2024-06-26 RX ADMIN — BEVACIZUMAB-MALY 375 MILLIGRAM(S): 400 INJECTION, SOLUTION INTRAVENOUS at 11:54

## 2024-06-26 RX ADMIN — BEVACIZUMAB-MALY 375 MILLIGRAM(S): 400 INJECTION, SOLUTION INTRAVENOUS at 12:25

## 2024-06-26 RX ADMIN — FOSAPREPITANT DIMEGLUMINE 150 MILLIGRAM(S): 150 INJECTION, POWDER, LYOPHILIZED, FOR SOLUTION INTRAVENOUS at 12:56

## 2024-06-26 RX ADMIN — Medication 202 MILLIGRAM(S): at 13:16

## 2024-06-26 RX ADMIN — DEXAMETHASONE 10 MILLIGRAM(S): 1 TABLET ORAL at 13:15

## 2024-06-27 LAB — CEA SERPL-MCNC: 17.5 NG/ML

## 2024-06-28 ENCOUNTER — OUTPATIENT (OUTPATIENT)
Dept: OUTPATIENT SERVICES | Facility: HOSPITAL | Age: 59
LOS: 1 days | End: 2024-06-28
Payer: COMMERCIAL

## 2024-06-28 ENCOUNTER — APPOINTMENT (OUTPATIENT)
Dept: INFUSION THERAPY | Facility: CLINIC | Age: 59
End: 2024-06-28

## 2024-06-28 VITALS
OXYGEN SATURATION: 98 % | RESPIRATION RATE: 16 BRPM | DIASTOLIC BLOOD PRESSURE: 68 MMHG | TEMPERATURE: 97 F | HEART RATE: 62 BPM | SYSTOLIC BLOOD PRESSURE: 114 MMHG

## 2024-06-28 DIAGNOSIS — Z90.49 ACQUIRED ABSENCE OF OTHER SPECIFIED PARTS OF DIGESTIVE TRACT: Chronic | ICD-10-CM

## 2024-06-28 DIAGNOSIS — Z90.89 ACQUIRED ABSENCE OF OTHER ORGANS: Chronic | ICD-10-CM

## 2024-06-28 DIAGNOSIS — Z98.890 OTHER SPECIFIED POSTPROCEDURAL STATES: Chronic | ICD-10-CM

## 2024-06-28 DIAGNOSIS — C18.9 MALIGNANT NEOPLASM OF COLON, UNSPECIFIED: ICD-10-CM

## 2024-06-28 PROCEDURE — 96372 THER/PROPH/DIAG INJ SC/IM: CPT

## 2024-06-28 RX ADMIN — PEGFILGRASTIM-CBQV 6 MILLIGRAM(S): 6 INJECTION, SOLUTION SUBCUTANEOUS at 15:10

## 2024-06-28 RX ADMIN — Medication 10 MILLILITER(S): at 15:15

## 2024-07-17 ENCOUNTER — APPOINTMENT (OUTPATIENT)
Dept: HEMATOLOGY ONCOLOGY | Facility: CLINIC | Age: 59
End: 2024-07-17
Payer: MEDICAID

## 2024-07-17 ENCOUNTER — APPOINTMENT (OUTPATIENT)
Dept: INFUSION THERAPY | Facility: CLINIC | Age: 59
End: 2024-07-17

## 2024-07-17 ENCOUNTER — OUTPATIENT (OUTPATIENT)
Dept: OUTPATIENT SERVICES | Facility: HOSPITAL | Age: 59
LOS: 1 days | End: 2024-07-17
Payer: COMMERCIAL

## 2024-07-17 ENCOUNTER — NON-APPOINTMENT (OUTPATIENT)
Age: 59
End: 2024-07-17

## 2024-07-17 VITALS
RESPIRATION RATE: 18 BRPM | BODY MASS INDEX: 26.37 KG/M2 | OXYGEN SATURATION: 100 % | HEIGHT: 67 IN | WEIGHT: 168 LBS | DIASTOLIC BLOOD PRESSURE: 85 MMHG | TEMPERATURE: 97.8 F | SYSTOLIC BLOOD PRESSURE: 121 MMHG | HEART RATE: 99 BPM

## 2024-07-17 VITALS
OXYGEN SATURATION: 97 % | SYSTOLIC BLOOD PRESSURE: 108 MMHG | TEMPERATURE: 98 F | RESPIRATION RATE: 16 BRPM | DIASTOLIC BLOOD PRESSURE: 70 MMHG | HEART RATE: 110 BPM

## 2024-07-17 VITALS
WEIGHT: 167.99 LBS | OXYGEN SATURATION: 100 % | TEMPERATURE: 98 F | HEIGHT: 67 IN | DIASTOLIC BLOOD PRESSURE: 85 MMHG | HEART RATE: 99 BPM | SYSTOLIC BLOOD PRESSURE: 121 MMHG | RESPIRATION RATE: 16 BRPM

## 2024-07-17 DIAGNOSIS — D69.59 OTHER SECONDARY THROMBOCYTOPENIA: ICD-10-CM

## 2024-07-17 DIAGNOSIS — T45.1X5A OTHER SECONDARY THROMBOCYTOPENIA: ICD-10-CM

## 2024-07-17 DIAGNOSIS — Z98.890 OTHER SPECIFIED POSTPROCEDURAL STATES: Chronic | ICD-10-CM

## 2024-07-17 DIAGNOSIS — Z90.89 ACQUIRED ABSENCE OF OTHER ORGANS: Chronic | ICD-10-CM

## 2024-07-17 DIAGNOSIS — C78.7 MALIGNANT NEOPLASM OF COLON, UNSPECIFIED: ICD-10-CM

## 2024-07-17 DIAGNOSIS — T45.1X5A NAUSEA WITH VOMITING, UNSPECIFIED: ICD-10-CM

## 2024-07-17 DIAGNOSIS — Z90.49 ACQUIRED ABSENCE OF OTHER SPECIFIED PARTS OF DIGESTIVE TRACT: Chronic | ICD-10-CM

## 2024-07-17 DIAGNOSIS — G89.3 NEOPLASM RELATED PAIN (ACUTE) (CHRONIC): ICD-10-CM

## 2024-07-17 DIAGNOSIS — C18.9 MALIGNANT NEOPLASM OF COLON, UNSPECIFIED: ICD-10-CM

## 2024-07-17 DIAGNOSIS — R11.2 NAUSEA WITH VOMITING, UNSPECIFIED: ICD-10-CM

## 2024-07-17 LAB
ALBUMIN SERPL ELPH-MCNC: 3.6 G/DL
ALP BLD-CCNC: 157 U/L
ALT SERPL-CCNC: 38 U/L
ANION GAP SERPL CALC-SCNC: 8 MMOL/L
APPEARANCE UR: CLEAR — SIGNIFICANT CHANGE UP
AST SERPL-CCNC: 40 U/L
BILIRUB SERPL-MCNC: 0.8 MG/DL
BILIRUB UR-MCNC: NEGATIVE — SIGNIFICANT CHANGE UP
BUN SERPL-MCNC: 11 MG/DL
CALCIUM SERPL-MCNC: 10 MG/DL
CEA SERPL-MCNC: 11.3 NG/ML
CHLORIDE SERPL-SCNC: 111 MMOL/L
CO2 SERPL-SCNC: 22 MMOL/L
COLOR SPEC: YELLOW — SIGNIFICANT CHANGE UP
CREAT SERPL-MCNC: 0.8 MG/DL
DIFF PNL FLD: NEGATIVE — SIGNIFICANT CHANGE UP
EGFR: 86 ML/MIN/1.73M2
GLUCOSE SERPL-MCNC: 113 MG/DL
GLUCOSE UR QL: NEGATIVE MG/DL — SIGNIFICANT CHANGE UP
HCT VFR BLD CALC: 36.9 %
HGB BLD-MCNC: 12 G/DL
KETONES UR-MCNC: NEGATIVE MG/DL — SIGNIFICANT CHANGE UP
LEUKOCYTE ESTERASE UR-ACNC: NEGATIVE — SIGNIFICANT CHANGE UP
LYMPHOCYTES # BLD AUTO: 2.6 K/UL
LYMPHOCYTES NFR BLD AUTO: 14.8 %
MAN DIFF?: NO
MCHC RBC-ENTMCNC: 32.4 PG
MCHC RBC-ENTMCNC: 32.5 GM/DL
MCV RBC AUTO: 99.7 FL
NEUTROPHILS # BLD AUTO: 13.7 K/UL
NEUTROPHILS NFR BLD AUTO: 78.1 %
NITRITE UR-MCNC: NEGATIVE — SIGNIFICANT CHANGE UP
PH UR: 5.5 — SIGNIFICANT CHANGE UP (ref 5–8)
PLATELET # BLD AUTO: 100 K/UL
POTASSIUM SERPL-SCNC: 4.4 MMOL/L
PROT SERPL-MCNC: 7.4 G/DL
PROT UR-MCNC: NEGATIVE MG/DL — SIGNIFICANT CHANGE UP
RBC # BLD: 3.7 M/UL
RBC # FLD: 23.2 %
SODIUM SERPL-SCNC: 141 MMOL/L
SP GR SPEC: >1.03 — HIGH (ref 1–1.03)
UROBILINOGEN FLD QL: 1 MG/DL — SIGNIFICANT CHANGE UP (ref 0.2–1)
WBC # FLD AUTO: 17.5 K/UL

## 2024-07-17 PROCEDURE — 99214 OFFICE O/P EST MOD 30 MIN: CPT | Mod: 25

## 2024-07-17 PROCEDURE — 81003 URINALYSIS AUTO W/O SCOPE: CPT

## 2024-07-17 PROCEDURE — 96375 TX/PRO/DX INJ NEW DRUG ADDON: CPT

## 2024-07-17 PROCEDURE — 96417 CHEMO IV INFUS EACH ADDL SEQ: CPT

## 2024-07-17 PROCEDURE — 96367 TX/PROPH/DG ADDL SEQ IV INF: CPT

## 2024-07-17 PROCEDURE — 96413 CHEMO IV INFUSION 1 HR: CPT

## 2024-07-17 PROCEDURE — 36415 COLL VENOUS BLD VENIPUNCTURE: CPT

## 2024-07-17 RX ORDER — PALONOSETRON HYDROCHLORIDE 0.25 MG/5ML
0.25 INJECTION, SOLUTION INTRAVENOUS ONCE
Refills: 0 | Status: COMPLETED | OUTPATIENT
Start: 2024-07-17 | End: 2024-07-17

## 2024-07-17 RX ORDER — DEXAMETHASONE 1 MG/1
10 TABLET ORAL ONCE
Refills: 0 | Status: COMPLETED | OUTPATIENT
Start: 2024-07-17 | End: 2024-07-17

## 2024-07-17 RX ORDER — FOSAPREPITANT DIMEGLUMINE 150 MG/5ML
150 INJECTION, POWDER, LYOPHILIZED, FOR SOLUTION INTRAVENOUS ONCE
Refills: 0 | Status: COMPLETED | OUTPATIENT
Start: 2024-07-17 | End: 2024-07-17

## 2024-07-17 RX ORDER — SODIUM CHLORIDE 0.9 % (FLUSH) 0.9 %
1000 SYRINGE (ML) INJECTION ONCE
Refills: 0 | Status: DISCONTINUED | OUTPATIENT
Start: 2024-07-17 | End: 2024-08-02

## 2024-07-17 RX ORDER — DIPHENHYDRAMINE HCL 12.5MG/5ML
25 ELIXIR ORAL ONCE
Refills: 0 | Status: COMPLETED | OUTPATIENT
Start: 2024-07-17 | End: 2024-07-17

## 2024-07-17 RX ORDER — OXALIPLATIN 5 MG/ML
140 INJECTION, SOLUTION, CONCENTRATE INTRAVENOUS ONCE
Refills: 0 | Status: COMPLETED | OUTPATIENT
Start: 2024-07-17 | End: 2024-07-17

## 2024-07-17 RX ORDER — LIDOCAINE HCL 28 MG/G
1 GEL TOPICAL ONCE
Refills: 0 | Status: COMPLETED | OUTPATIENT
Start: 2024-07-17 | End: 2024-07-17

## 2024-07-17 RX ORDER — FLUOROURACIL 50 MG/ML
4530 INJECTION, SOLUTION INTRAVENOUS ONCE
Refills: 0 | Status: DISCONTINUED | OUTPATIENT
Start: 2024-07-17 | End: 2024-07-17

## 2024-07-17 RX ORDER — METOCLOPRAMIDE 5 MG/5ML
10 SOLUTION ORAL ONCE
Refills: 0 | Status: COMPLETED | OUTPATIENT
Start: 2024-07-17 | End: 2024-07-17

## 2024-07-17 RX ORDER — FLUOROURACIL 50 MG/ML
750 INJECTION, SOLUTION INTRAVENOUS ONCE
Refills: 0 | Status: DISCONTINUED | OUTPATIENT
Start: 2024-07-17 | End: 2024-07-17

## 2024-07-17 RX ORDER — LEUCOVORIN CALCIUM 50 MG
750 VIAL (EA) INJECTION ONCE
Refills: 0 | Status: COMPLETED | OUTPATIENT
Start: 2024-07-17 | End: 2024-07-17

## 2024-07-17 RX ORDER — BEVACIZUMAB-MALY 400 MG/16ML
380 INJECTION, SOLUTION INTRAVENOUS ONCE
Refills: 0 | Status: COMPLETED | OUTPATIENT
Start: 2024-07-17 | End: 2024-07-17

## 2024-07-17 RX ADMIN — Medication 750 MILLIGRAM(S): at 11:59

## 2024-07-17 RX ADMIN — PALONOSETRON HYDROCHLORIDE 0.25 MILLIGRAM(S): 0.25 INJECTION, SOLUTION INTRAVENOUS at 09:53

## 2024-07-17 RX ADMIN — Medication 25 MILLIGRAM(S): at 11:27

## 2024-07-17 RX ADMIN — BEVACIZUMAB-MALY 380 MILLIGRAM(S): 400 INJECTION, SOLUTION INTRAVENOUS at 10:39

## 2024-07-17 RX ADMIN — FOSAPREPITANT DIMEGLUMINE 500 MILLIGRAM(S): 150 INJECTION, POWDER, LYOPHILIZED, FOR SOLUTION INTRAVENOUS at 10:41

## 2024-07-17 RX ADMIN — DEXAMETHASONE 204 MILLIGRAM(S): 1 TABLET ORAL at 11:28

## 2024-07-17 RX ADMIN — DEXAMETHASONE 10 MILLIGRAM(S): 1 TABLET ORAL at 11:45

## 2024-07-17 RX ADMIN — FOSAPREPITANT DIMEGLUMINE 150 MILLIGRAM(S): 150 INJECTION, POWDER, LYOPHILIZED, FOR SOLUTION INTRAVENOUS at 11:10

## 2024-07-17 RX ADMIN — OXALIPLATIN 140 MILLIGRAM(S): 5 INJECTION, SOLUTION, CONCENTRATE INTRAVENOUS at 12:00

## 2024-07-17 RX ADMIN — METOCLOPRAMIDE 10 MILLIGRAM(S): 5 SOLUTION ORAL at 09:49

## 2024-07-17 RX ADMIN — Medication 202 MILLIGRAM(S): at 11:12

## 2024-07-17 RX ADMIN — BEVACIZUMAB-MALY 380 MILLIGRAM(S): 400 INJECTION, SOLUTION INTRAVENOUS at 10:01

## 2024-07-17 NOTE — DISCHARGE INSTRUCTIONS: CHEMOTHERAPY - NSRNDCEVAL_HEME_A_AMB_QA
Please share these instructions with your physicians if you are seen by a physician between treatment room visits.
Discharged

## 2024-07-19 ENCOUNTER — APPOINTMENT (OUTPATIENT)
Dept: INFUSION THERAPY | Facility: CLINIC | Age: 59
End: 2024-07-19

## 2024-07-22 ENCOUNTER — NON-APPOINTMENT (OUTPATIENT)
Age: 59
End: 2024-07-22

## 2024-07-24 NOTE — HISTORY OF PRESENT ILLNESS
[de-identified] : Romy Oquendo is a 59 year old female here for f/u of metastatic colon cancer.  Oncologic history: 1. metastatic colon cancer --original diagnosis in 2018.  s/p sigmoid colon resection for a [id number] cancer on 7/11/2018 at [facility] with Dr Jimenez. --received adjuvant chemo (? folfox?) --liver metastases (x 2) identified in 12/2021.  went on chemo with Folfiri + cetuximab with Dr Oziel Paulson.  Partial response by PET.  Partial liver resection 10/6/22.  two lesions, both adenocarcinoma consistent with colonic primary.  MMR intact.  margins negative.  characterized by pathology as "minimal/no treatment response."   --NGS on the liver metastases from 2022:  BRCA-2 frameshift mutation (pathogenic);  PIK3CA [id number];  p53.  DEJUAN, TMB low. --received more chemo "adjuvant" unclear exactly what  --10/2023:  found to have recurrent cancer in the liver + possible peritoneal recurrence + ovarian mass --s/p diagnostic laparoscopy ([facility]) + L oophorectomy ([facility]).  No peritoneal carcinomatosis.  Ovary with endometrioid adenocarcinoma, moderately differentiated, arising from a background of endometriosis in the L ovary/fallopian tube.  1.8 cm tumor.   --followed by robotic hysterectomy + R salpingo-oophorectomy, and extended liver resection on 11/9/23.  Pathology from the liver consistent with metastatic colon cancer.  Pathology from the GYN specimens benign --PET scan 2/27/24:  Extensive metastatic malignancy.   multiple new hypermetabolic low attenuation lesions in the liver, c/w new liver metastases, New hypermetabolic mesenteric lymph nodes inferior to the liver, New hypermetabolic subcutaneous, intramuscular, intraperitoneal nodules associated with the anterior abdominal wall, Multiple new b/l pulm nodules, the larger ones are hypermetabolic on pet, New hypermetabolic L hilar lymphadenopathy, Increase in size and intensity of R cardiophrenic lymphadenopathy --foundation one NGS on liver metastasis resected in 11/2023:  DEJUAN.  TMB = 1 mut/mB.  KAYLA, BRAF WT.  mutations detected in APC, BRCA-2, PTEN, BTG1, CTCF, TP53 --started Folfox + bevacizumab on 4/16/24 --PET/[facility] at [facility] on 7/17/24, pending result  2. endometrial adenocarcinoma - stage IA- diagnosed 10/2023 - as above.  no adjuvant therapy  3.  BRCA-2 pathogenic mutation --s/p hysterectomy + BSO.  PMH, PSH reviewed in the chart [facility] - mother with ovarian cancer, MDS. SH- lives in [place].  Single. no children.  reports good support from family, friends.  nonsmoker, denies regular ETOH [de-identified] : Here for cycle 7 Folfox + bevacizumab. She had a PET/CT done at [facility] this morning.   cycle 7 was delayed by a week due to travel.   nausea/fatigue for 3-4 days after chemo and then bounces back. Compazine as prn with help.  appetite up/down but wt stable. pain has been better, stable, on hydrococodone-acetaminophen as prn,  also medical marijuana. continues gabapentin for cold dysesthesias and peripheral neuropathies with relief, stable not worse, not interfering with ADLs.       bowel movement is ok, No blood in stools or urine.  overall she feel ok.  denies SOB, fever, palpitations, dizziness, headache, chest pain, or any new sites of pain.

## 2024-07-24 NOTE — HISTORY OF PRESENT ILLNESS
[de-identified] : Romy Oquedno is a 59 year old female here for f/u of metastatic colon cancer.  Oncologic history: 1. metastatic colon cancer --original diagnosis in 2018.  s/p sigmoid colon resection for a [id number] cancer on 7/11/2018 at [facility] with Dr Jimenez. --received adjuvant chemo (? folfox?) --liver metastases (x 2) identified in 12/2021.  went on chemo with Folfiri + cetuximab with Dr Oziel Paulson.  Partial response by PET.  Partial liver resection 10/6/22.  two lesions, both adenocarcinoma consistent with colonic primary.  MMR intact.  margins negative.  characterized by pathology as "minimal/no treatment response."   --NGS on the liver metastases from 2022:  BRCA-2 frameshift mutation (pathogenic);  PIK3CA [id number];  p53.  DEJUAN, TMB low. --received more chemo "adjuvant" unclear exactly what  --10/2023:  found to have recurrent cancer in the liver + possible peritoneal recurrence + ovarian mass --s/p diagnostic laparoscopy ([facility]) + L oophorectomy ([facility]).  No peritoneal carcinomatosis.  Ovary with endometrioid adenocarcinoma, moderately differentiated, arising from a background of endometriosis in the L ovary/fallopian tube.  1.8 cm tumor.   --followed by robotic hysterectomy + R salpingo-oophorectomy, and extended liver resection on 11/9/23.  Pathology from the liver consistent with metastatic colon cancer.  Pathology from the GYN specimens benign --PET scan 2/27/24:  Extensive metastatic malignancy.   multiple new hypermetabolic low attenuation lesions in the liver, c/w new liver metastases, New hypermetabolic mesenteric lymph nodes inferior to the liver, New hypermetabolic subcutaneous, intramuscular, intraperitoneal nodules associated with the anterior abdominal wall, Multiple new b/l pulm nodules, the larger ones are hypermetabolic on pet, New hypermetabolic L hilar lymphadenopathy, Increase in size and intensity of R cardiophrenic lymphadenopathy --foundation one NGS on liver metastasis resected in 11/2023:  DEJUAN.  TMB = 1 mut/mB.  KAYLA, BRAF WT.  mutations detected in APC, BRCA-2, PTEN, BTG1, CTCF, TP53 --started Folfox + bevacizumab on 4/16/24 --PET/CT at [facility] on 7/17/24, pending result  2. endometrial adenocarcinoma - stage IA- diagnosed 10/2023 - as above.  no adjuvant therapy  3.  BRCA-2 pathogenic mutation --s/p hysterectomy + BSO.  PMH, PSH reviewed in the chart [facility] - mother with ovarian cancer, MDS. SH- lives in [place].  Single. no children.  reports good support from family, friends.  nonsmoker, denies regular ETOH [de-identified] : Here for cycle 7 Folfox + bevacizumab. She had a PET/CT done at [facility] this morning.   cycle 7 was delayed by a week due to travel.   nausea/fatigue for 3-4 days after chemo and then bounces back. Compazine as prn with help.  appetite up/down but wt stable. pain has been better, stable, on hydrococodone-acetaminophen as prn,  also medical marijuana. continues gabapentin for cold dysesthesias and peripheral neuropathies with relief, stable not worse, not interfering with ADLs.       bowel movement is ok, No blood in stools or urine.  overall she feel ok.  denies SOB, fever, palpitations, dizziness, headache, chest pain, or any new sites of pain.

## 2024-07-24 NOTE — ASSESSMENT
[FreeTextEntry1] : Romy Oquendo is a 59 year old female BRCA-2 pathogenic mutation carrier with a history of sigmoid colon cancer originally resected in 7/2018.  At that time she had stage III disease.  Received adjuvant chemotherapy (presumably with Folfox although unconfirmed) but unfortunately she was found to have hepatic metastatic disease in 12/2021.  She then underwent chemotherapy with Folfiri + cetuximab for several cycles, followed by liver resection in 10/2022.  Pathology confirmed metastatic colon cancer, MMR intact.  She received some chemotherapy after surgery (? what type?).  In 10/2023 was found to have recurrent liver metastasis + a L ovarian mass.  The L ovary and Fallopian tube was resected in 10/2023 and was pathologically a new tumor, endometrial adenocarcinoma arising in a focus of endometriosis.  Complete surgical staging w/ hysterectomy + R salpingo-oophorectomy did not reveal any additional disease, rendering this a stage IA endometrial cancer not in need of any further therapy.  The liver metastasis was resected in 11/2023.  She did not receive any post-operative chemotherapy.  PET scan from 2/27/24 shows widely metastatic cancer - lung mets, liver mets, adenopathy above and below the diaphragm, and abdominal wall/peritoneal nodules.   Transitioned care from her previous oncologist 4/2024 due to insurance change.  Started Folfox + bevacizumab on 4/16//24  Plan: #. metastatic colon cancer --reviewed history with the patient at the first visit, limited records available and she was able to supplement some information.  have been unable to get records from her previous oncologist.  --started Folfox + bevacizumab on 4/16/24, tolerating chemo well with manageable site effects.  Prior to starting treatment she had a palpable nodule along the hepatectomy incision that correlated w/ one of the locations of the soft tissue metastases seen on the PET scan.  This is no longer palpable.  hopefully a surrogate marker for a response to treatment. --cycle 7 was delayed by a week due to travel. labs reviewed, OK for C7 of chemotherapy today.   --trend CEA, went down to 11.3,  Will repeat every 4 weeks.  --repeated PET[facility] at [facility] this morning, will follow up on result.  Since she has soft tissue metastases to track, PET is a better imaging modality than CT.  #. cancer related pain-stable  --pt prefers to use tylenol #3 but has been unavailable at numerous pharmacies.  She wishes to avoid oxycodone due to previous bad reaction.  Also had poor tolerance of tramadol (nausea, drowsiness).  has Rx for hydrocodone PRN which has been helpful as well as medical marijuana. --following w/ Dr Johnson  #. h/o neutropenia 2/2 chemotherapy --using udenyca intermittently w/ treatment.  will use this cycle.   #. BRCA-2 pathogenic mutation carrier --identified on tumor NGS from 2022.  unknown if she ever had germline genetic testing.  ? parp inhibitor in future if fails standard therapies?  # chemo induced N/V --does not get good relief from Zofran.  has had emesis on day #1 of treatment.  added Reglan to chemo pre-meds and this problem has resolved. --Compazine as prn with help   # chemo induced neuropathy-stable  --monitor.  Back on gabapentin BID which she feels has been beneficial  # chemo induced thrombocytopenia --plt 100K today, but she denies bleeding.  --ok for chemo, monitor closely  Addendum called by infusion nurse that pt complained of N/V while receiving Oxaliplatin even though she received all pre meds including Emend, Dexamethasone, Aloxi, Reglan and Compazine PO.  When we went to see pt, she had rigors but no fever at that time. Stopped the rest of chemo and gave IVF. She received Avastin and around 60cc of Oxaliplatin.  Oxaliplatin reaction ? vs infection/inflammation? Will see PET/CT results and decide further plan.   plan discussed with Dr. Pñea  [facility] for cycle 8 of Folfox + bevacizumab on 7/31/24  labs- CBC, CMP, UA for Avastin

## 2024-07-24 NOTE — PHYSICAL EXAM
[Restricted in physically strenuous activity but ambulatory and able to carry out work of a light or sedentary nature] : Status 1- Restricted in physically strenuous activity but ambulatory and able to carry out work of a light or sedentary nature, e.g., light house work, office work [Normal] : affect appropriate [de-identified] : right chemo port in place,  no signs of infection [de-identified] : multiple surgical scars well healed.  No organomegaly.  soft and not distended.  previously palpable nodule at the superior aspect of the hepatectomy incision is no longer palpable.  no palpable masses.  not tender.  not distended.

## 2024-07-24 NOTE — ASSESSMENT
[FreeTextEntry1] : Romy Oquendo is a 59 year old female BRCA-2 pathogenic mutation carrier with a history of sigmoid colon cancer originally resected in 7/2018.  At that time she had stage III disease.  Received adjuvant chemotherapy (presumably with Folfox although unconfirmed) but unfortunately she was found to have hepatic metastatic disease in 12/2021.  She then underwent chemotherapy with Folfiri + cetuximab for several cycles, followed by liver resection in 10/2022.  Pathology confirmed metastatic colon cancer, MMR intact.  She received some chemotherapy after surgery (? what type?).  In 10/2023 was found to have recurrent liver metastasis + a L ovarian mass.  The L ovary and Fallopian tube was resected in 10/2023 and was pathologically a new tumor, endometrial adenocarcinoma arising in a focus of endometriosis.  Complete surgical staging w/ hysterectomy + R salpingo-oophorectomy did not reveal any additional disease, rendering this a stage IA endometrial cancer not in need of any further therapy.  The liver metastasis was resected in 11/2023.  She did not receive any post-operative chemotherapy.  PET scan from 2/27/24 shows widely metastatic cancer - lung mets, liver mets, adenopathy above and below the diaphragm, and abdominal wall/peritoneal nodules.   Transitioned care from her previous oncologist 4/2024 due to insurance change.  Started Folfox + bevacizumab on 4/16//24  Plan: #. metastatic colon cancer --reviewed history with the patient at the first visit, limited records available and she was able to supplement some information.  have been unable to get records from her previous oncologist.  --started Folfox + bevacizumab on 4/16/24, tolerating chemo well with manageable site effects.  Prior to starting treatment she had a palpable nodule along the hepatectomy incision that correlated w/ one of the locations of the soft tissue metastases seen on the PET scan.  This is no longer palpable.  hopefully a surrogate marker for a response to treatment. --cycle 7 was delayed by a week due to travel. labs reviewed, OK for C7 of chemotherapy today.   --trend CEA, went down to 11.3,  Will repeat every 4 weeks.  --repeated PET-CT at [facility] this morning, will follow up on result.  Since she has soft tissue metastases to track, PET is a better imaging modality than CT.  #. cancer related pain-stable  --pt prefers to use tylenol #3 but has been unavailable at numerous pharmacies.  She wishes to avoid oxycodone due to previous bad reaction.  Also had poor tolerance of tramadol (nausea, drowsiness).  has Rx for hydrocodone PRN which has been helpful as well as medical marijuana. --following w/ Dr Johnson  #. h/o neutropenia 2/2 chemotherapy --using udenyca intermittently w/ treatment.  will use this cycle.   #. BRCA-2 pathogenic mutation carrier --identified on tumor NGS from 2022.  unknown if she ever had germline genetic testing.  ? parp inhibitor in future if fails standard therapies?  # chemo induced N/V --does not get good relief from Zofran.  has had emesis on day #1 of treatment.  added Reglan to chemo pre-meds and this problem has resolved. --Compazine as prn with help   # chemo induced neuropathy-stable  --monitor.  Back on gabapentin BID which she feels has been beneficial  # chemo induced thrombocytopenia --plt 100K today, but she denies bleeding.  --ok for chemo, monitor closely  Addendum called by infusion nurse that pt complained of N/V while receiving Oxaliplatin even though she received all pre meds including Emend, Dexamethasone, Aloxi, Reglan and Compazine PO.  When we went to see pt, she had rigors but no fever at that time. Stopped the rest of chemo and gave IVF. She received Avastin and around 60cc of Oxaliplatin.  Oxaliplatin reaction ? vs infection/inflammation? Will see PET/CT results and decide further plan.   plan discussed with Dr. Peña  [facility] [[email or web address] for cycle 8 of Folfox + bevacizumab on 7/31/24  labs- CBC, CMP, UA for Avastin

## 2024-07-24 NOTE — PHYSICAL EXAM
[Restricted in physically strenuous activity but ambulatory and able to carry out work of a light or sedentary nature] : Status 1- Restricted in physically strenuous activity but ambulatory and able to carry out work of a light or sedentary nature, e.g., light house work, office work [Normal] : affect appropriate [de-identified] : right chemo port in place,  no signs of infection [de-identified] : multiple surgical scars well healed.  No organomegaly.  soft and not distended.  previously palpable nodule at the superior aspect of the hepatectomy incision is no longer palpable.  no palpable masses.  not tender.  not distended.

## 2024-07-30 RX ORDER — FLUOROURACIL 50 MG/ML
4530 INJECTION, SOLUTION INTRAVENOUS ONCE
Refills: 0 | Status: COMPLETED | OUTPATIENT
Start: 2024-07-31 | End: 2024-07-31

## 2024-07-30 RX ORDER — FLUOROURACIL 50 MG/ML
750 INJECTION, SOLUTION INTRAVENOUS ONCE
Refills: 0 | Status: COMPLETED | OUTPATIENT
Start: 2024-07-31 | End: 2024-07-31

## 2024-07-30 NOTE — PHARMACY COMMUNICATION NOTE - COMMENTS
Dr. Peña emailed me asking what the highest dose of dexamethasone that can safely be administered with fosaprepitant considering the drug interaction.   I replied, "Fosaprepitant increases the AUC of dexamethasone by approximately two-fold. It is recommended to reduced dexamethasone from 20mg to 12mg when administered along with fosaprepitant. There have been certain circumstances ie: paclitaxel HSRs, in which prescribers still wish to administer 20mg of dexamethasone regardless of the drug interaction. Let us know how you would like to proceed."    Dr. Peña replied, "I was a little worried that she might have had an oxaliplatin reaction last time she was here.  the situation was very murky if it was a reaction or not but oxaliplatin reactions make me nervous.   that day she had 10 of dex and 25 of benadryl - reasonable doses.  Are you OK if I leave the dex at 20 mg?"    I replied, "Yes, we can give the dex 20mg with fosaprepitant for her next cycle and reevaluate for future cycles."

## 2024-07-31 ENCOUNTER — OUTPATIENT (OUTPATIENT)
Dept: OUTPATIENT SERVICES | Facility: HOSPITAL | Age: 59
LOS: 1 days | End: 2024-07-31
Payer: COMMERCIAL

## 2024-07-31 ENCOUNTER — APPOINTMENT (OUTPATIENT)
Dept: HEMATOLOGY ONCOLOGY | Facility: CLINIC | Age: 59
End: 2024-07-31
Payer: MEDICAID

## 2024-07-31 ENCOUNTER — APPOINTMENT (OUTPATIENT)
Dept: INFUSION THERAPY | Facility: CLINIC | Age: 59
End: 2024-07-31

## 2024-07-31 VITALS
DIASTOLIC BLOOD PRESSURE: 74 MMHG | TEMPERATURE: 98 F | HEIGHT: 67 IN | SYSTOLIC BLOOD PRESSURE: 104 MMHG | HEART RATE: 89 BPM | WEIGHT: 168 LBS | RESPIRATION RATE: 18 BRPM | OXYGEN SATURATION: 98 % | BODY MASS INDEX: 26.37 KG/M2

## 2024-07-31 VITALS — DIASTOLIC BLOOD PRESSURE: 85 MMHG | SYSTOLIC BLOOD PRESSURE: 139 MMHG

## 2024-07-31 VITALS
OXYGEN SATURATION: 98 % | WEIGHT: 167.99 LBS | HEIGHT: 67 IN | HEART RATE: 89 BPM | SYSTOLIC BLOOD PRESSURE: 104 MMHG | RESPIRATION RATE: 16 BRPM | TEMPERATURE: 98 F | DIASTOLIC BLOOD PRESSURE: 74 MMHG

## 2024-07-31 DIAGNOSIS — G62.0 DRUG-INDUCED POLYNEUROPATHY: ICD-10-CM

## 2024-07-31 DIAGNOSIS — C78.7 MALIGNANT NEOPLASM OF COLON, UNSPECIFIED: ICD-10-CM

## 2024-07-31 DIAGNOSIS — T45.1X5A DRUG-INDUCED POLYNEUROPATHY: ICD-10-CM

## 2024-07-31 DIAGNOSIS — G89.3 NEOPLASM RELATED PAIN (ACUTE) (CHRONIC): ICD-10-CM

## 2024-07-31 DIAGNOSIS — Z98.890 OTHER SPECIFIED POSTPROCEDURAL STATES: Chronic | ICD-10-CM

## 2024-07-31 DIAGNOSIS — Z90.89 ACQUIRED ABSENCE OF OTHER ORGANS: Chronic | ICD-10-CM

## 2024-07-31 DIAGNOSIS — C18.9 MALIGNANT NEOPLASM OF COLON, UNSPECIFIED: ICD-10-CM

## 2024-07-31 PROCEDURE — 96375 TX/PRO/DX INJ NEW DRUG ADDON: CPT

## 2024-07-31 PROCEDURE — 99215 OFFICE O/P EST HI 40 MIN: CPT | Mod: 25

## 2024-07-31 PROCEDURE — 96413 CHEMO IV INFUSION 1 HR: CPT

## 2024-07-31 PROCEDURE — 96416 CHEMO PROLONG INFUSE W/PUMP: CPT

## 2024-07-31 PROCEDURE — 96417 CHEMO IV INFUS EACH ADDL SEQ: CPT

## 2024-07-31 PROCEDURE — 96367 TX/PROPH/DG ADDL SEQ IV INF: CPT

## 2024-07-31 RX ORDER — BACTERIOSTATIC SODIUM CHLORIDE 0.9 %
1000 VIAL (ML) INJECTION
Refills: 0 | Status: DISCONTINUED | OUTPATIENT
Start: 2024-07-31 | End: 2024-08-02

## 2024-07-31 RX ORDER — METHYLPREDNISOLONE ACETATE 40 MG/ML
125 INJECTION, SUSPENSION INTRA-ARTICULAR; INTRALESIONAL; INTRAMUSCULAR; INTRASYNOVIAL; SOFT TISSUE ONCE
Refills: 0 | Status: COMPLETED | OUTPATIENT
Start: 2024-07-31 | End: 2024-07-31

## 2024-07-31 RX ORDER — PALONOSETRON HYDROCHLORIDE 0.25 MG/5ML
0.25 INJECTION, SOLUTION INTRAVENOUS ONCE
Refills: 0 | Status: COMPLETED | OUTPATIENT
Start: 2024-07-31 | End: 2024-07-31

## 2024-07-31 RX ORDER — BEVACIZUMAB-MALY 100 MG/4ML
380 INJECTION, SOLUTION INTRAVENOUS ONCE
Refills: 0 | Status: COMPLETED | OUTPATIENT
Start: 2024-07-31 | End: 2024-07-31

## 2024-07-31 RX ORDER — LEUCOVORIN CALCIUM 10 MG/ML
750 INJECTION INTRAMUSCULAR; INTRAVENOUS ONCE
Refills: 0 | Status: COMPLETED | OUTPATIENT
Start: 2024-07-31 | End: 2024-07-31

## 2024-07-31 RX ORDER — DIPHENHYDRAMINE HCL 25 MG
50 CAPSULE ORAL ONCE
Refills: 0 | Status: COMPLETED | OUTPATIENT
Start: 2024-07-31 | End: 2024-07-31

## 2024-07-31 RX ORDER — FOSAPREPITANT DIMEGLUMINE 150 MG/5ML
150 INJECTION, POWDER, LYOPHILIZED, FOR SOLUTION INTRAVENOUS ONCE
Refills: 0 | Status: COMPLETED | OUTPATIENT
Start: 2024-07-31 | End: 2024-07-31

## 2024-07-31 RX ORDER — LIDOCAINE 5% 5 G/100G
1 CREAM TOPICAL ONCE
Refills: 0 | Status: COMPLETED | OUTPATIENT
Start: 2024-07-31 | End: 2024-07-31

## 2024-07-31 RX ORDER — OXALIPLATIN 5 MG/ML
140 INJECTION, SOLUTION INTRAVENOUS ONCE
Refills: 0 | Status: COMPLETED | OUTPATIENT
Start: 2024-07-31 | End: 2024-07-31

## 2024-07-31 RX ORDER — FAMOTIDINE 40 MG/1
20 TABLET, FILM COATED ORAL ONCE
Refills: 0 | Status: COMPLETED | OUTPATIENT
Start: 2024-07-31 | End: 2024-07-31

## 2024-07-31 RX ORDER — DEXAMETHASONE 1.5 MG/1
20 TABLET ORAL ONCE
Refills: 0 | Status: COMPLETED | OUTPATIENT
Start: 2024-07-31 | End: 2024-07-31

## 2024-07-31 RX ADMIN — DEXAMETHASONE 220 MILLIGRAM(S): 1.5 TABLET ORAL at 11:24

## 2024-07-31 RX ADMIN — Medication 50 MILLIGRAM(S): at 12:00

## 2024-07-31 RX ADMIN — FLUOROURACIL 90 MILLIGRAM(S): 50 INJECTION, SOLUTION INTRAVENOUS at 15:55

## 2024-07-31 RX ADMIN — FLUOROURACIL 4530 MILLIGRAM(S): 50 INJECTION, SOLUTION INTRAVENOUS at 15:56

## 2024-07-31 RX ADMIN — OXALIPLATIN 140 MILLIGRAM(S): 5 INJECTION, SOLUTION INTRAVENOUS at 13:00

## 2024-07-31 RX ADMIN — BEVACIZUMAB-MALY 380 MILLIGRAM(S): 100 INJECTION, SOLUTION INTRAVENOUS at 11:06

## 2024-07-31 RX ADMIN — METHYLPREDNISOLONE ACETATE 125 MILLIGRAM(S): 40 INJECTION, SUSPENSION INTRA-ARTICULAR; INTRALESIONAL; INTRAMUSCULAR; INTRASYNOVIAL; SOFT TISSUE at 13:31

## 2024-07-31 RX ADMIN — PALONOSETRON HYDROCHLORIDE 0.25 MILLIGRAM(S): 0.25 INJECTION, SOLUTION INTRAVENOUS at 11:24

## 2024-07-31 RX ADMIN — FAMOTIDINE 20 MILLIGRAM(S): 40 TABLET, FILM COATED ORAL at 11:24

## 2024-07-31 RX ADMIN — LEUCOVORIN CALCIUM 750 MILLIGRAM(S): 10 INJECTION INTRAMUSCULAR; INTRAVENOUS at 13:00

## 2024-07-31 RX ADMIN — FOSAPREPITANT DIMEGLUMINE 150 MILLIGRAM(S): 150 INJECTION, POWDER, LYOPHILIZED, FOR SOLUTION INTRAVENOUS at 12:34

## 2024-07-31 RX ADMIN — Medication 1000 MILLILITER(S): at 15:30

## 2024-07-31 RX ADMIN — FOSAPREPITANT DIMEGLUMINE 500 MILLIGRAM(S): 150 INJECTION, POWDER, LYOPHILIZED, FOR SOLUTION INTRAVENOUS at 12:04

## 2024-07-31 RX ADMIN — DEXAMETHASONE 20 MILLIGRAM(S): 1.5 TABLET ORAL at 11:39

## 2024-07-31 RX ADMIN — BEVACIZUMAB-MALY 380 MILLIGRAM(S): 100 INJECTION, SOLUTION INTRAVENOUS at 10:36

## 2024-07-31 RX ADMIN — Medication 204 MILLIGRAM(S): at 11:45

## 2024-07-31 RX ADMIN — Medication 500 MILLILITER(S): at 13:32

## 2024-07-31 NOTE — HISTORY OF PRESENT ILLNESS
[de-identified] : Romy Oquendo is a 59 year old female here for f/u of metastatic colon cancer.   Oncologic history: 1. metastatic colon cancer --original diagnosis in 2018.  s/p sigmoid colon resection for a [id number] cancer on 7/11/2018 at [facility] with Dr Jimenez. --received adjuvant chemo (? folfox?) --liver metastases (x 2) identified in 12/2021.  went on chemo with Folfiri + cetuximab with Dr Oziel Paulson.  Partial response by PET.  Partial liver resection 10/6/22.  two lesions, both adenocarcinoma consistent with colonic primary.  MMR intact.  margins negative.  characterized by pathology as "minimal/no treatment response."   --NGS on the liver metastases from 2022:  BRCA-2 frameshift mutation (pathogenic);  PIK3CA [id number];  p53.  DEJUAN, TMB low. --received more chemo "adjuvant" unclear exactly what  --10/2023:  found to have recurrent cancer in the liver + possible peritoneal recurrence + ovarian mass --s/p diagnostic laparoscopy ([facility]) + L oophorectomy ([facility]).  No peritoneal carcinomatosis.  Ovary with endometrioid adenocarcinoma, moderately differentiated, arising from a background of endometriosis in the L ovary/fallopian tube.  1.8 cm tumor.   --followed by robotic hysterectomy + R salpingo-oophorectomy, and extended liver resection on 11/9/23.  Pathology from the liver consistent with metastatic colon cancer.  Pathology from the GYN specimens benign --PET scan 2/27/24:  Extensive metastatic malignancy.   multiple new hypermetabolic low attenuation lesions in the liver, c/w new liver metastases, New hypermetabolic mesenteric lymph nodes inferior to the liver, New hypermetabolic subcutaneous, intramuscular, intraperitoneal nodules associated with the anterior abdominal wall, Multiple new b/l pulm nodules, the larger ones are hypermetabolic on pet, New hypermetabolic L hilar lymphadenopathy, Increase in size and intensity of R cardiophrenic lymphadenopathy --foundation one NGS on liver metastasis resected in 11/2023:  DEJUAN.  TMB = 1 mut/mB.  KAYLA, BRAF WT.  mutations detected in APC, BRCA-2, PTEN, BTG1, CTCF, TP53 --started Folfox + bevacizumab on 4/16/24  2. endometrial adenocarcinoma - stage IA- diagnosed 10/2023 - as above.  no adjuvant therapy  3.  BRCA-2 pathogenic mutation --s/p hysterectomy + BSO.  PMH, PSH reviewed in the chart [facility] - mother with ovarian cancer, MDS. SH- lives in [place].  Single. no children.  reports good support from family, friends.  nonsmoker, denies regular ETOH [de-identified] : Here for cycle 8 Folfox + bevacizumab. C7 with around 60cc of oxaliplatin administered. Infusion stopped 2/2 chills -- possible infusion reaction?  Feels very well, no complaints at this time. No numbness/tingling. Eating well, weight is stable.  nausea/fatigue for 3-4 days after chemo and then bounces back. Compazine as prn with help.  pain has been better, stable, on hydrococodone-acetaminophen as prn,  also medical marijuana. continues gabapentin for cold dysesthesias and peripheral neuropathies with relief, stable not worse, not interfering with ADLs.       bowel movement is ok, No blood in stools or urine.  overall she feel ok.  denies SOB, fever, palpitations, dizziness, headache, chest pain, or any new sites of pain.

## 2024-07-31 NOTE — HISTORY OF PRESENT ILLNESS
[de-identified] : oRmy Oquendo is a 59 year old female here for f/u of metastatic colon cancer.   Oncologic history: 1. metastatic colon cancer --original diagnosis in 2018.  s/p sigmoid colon resection for a [id number] cancer on 7/11/2018 at [facility] with Dr Jimenez. --received adjuvant chemo (? folfox?) --liver metastases (x 2) identified in 12/2021.  went on chemo with Folfiri + cetuximab with Dr Oziel Paulson.  Partial response by PET.  Partial liver resection 10/6/22.  two lesions, both adenocarcinoma consistent with colonic primary.  MMR intact.  margins negative.  characterized by pathology as "minimal/no treatment response."   --NGS on the liver metastases from 2022:  BRCA-2 frameshift mutation (pathogenic);  PIK3CA [id number];  p53.  DEJUAN, TMB low. --received more chemo "adjuvant" unclear exactly what  --10/2023:  found to have recurrent cancer in the liver + possible peritoneal recurrence + ovarian mass --s/p diagnostic laparoscopy ([facility]) + L oophorectomy ([facility]).  No peritoneal carcinomatosis.  Ovary with endometrioid adenocarcinoma, moderately differentiated, arising from a background of endometriosis in the L ovary/fallopian tube.  1.8 cm tumor.   --followed by robotic hysterectomy + R salpingo-oophorectomy, and extended liver resection on 11/9/23.  Pathology from the liver consistent with metastatic colon cancer.  Pathology from the GYN specimens benign --PET scan 2/27/24:  Extensive metastatic malignancy.   multiple new hypermetabolic low attenuation lesions in the liver, c/w new liver metastases, New hypermetabolic mesenteric lymph nodes inferior to the liver, New hypermetabolic subcutaneous, intramuscular, intraperitoneal nodules associated with the anterior abdominal wall, Multiple new b/l pulm nodules, the larger ones are hypermetabolic on pet, New hypermetabolic L hilar lymphadenopathy, Increase in size and intensity of R cardiophrenic lymphadenopathy --foundation one NGS on liver metastasis resected in 11/2023:  DEJUAN.  TMB = 1 mut/mB.  KAYLA, BRAF WT.  mutations detected in APC, BRCA-2, PTEN, BTG1, CTCF, TP53 --started Folfox + bevacizumab on 4/16/24  2. endometrial adenocarcinoma - stage IA- diagnosed 10/2023 - as above.  no adjuvant therapy  3.  BRCA-2 pathogenic mutation --s/p hysterectomy + BSO.  PMH, PSH reviewed in the chart [facility] - mother with ovarian cancer, MDS. SH- lives in [place].  Single. no children.  reports good support from family, friends.  nonsmoker, denies regular ETOH [de-identified] : Here for cycle 8 Folfox + bevacizumab. C7 with around 60cc of oxaliplatin administered. Infusion stopped 2/2 chills -- possible infusion reaction?  Feels very well, no complaints at this time. No numbness/tingling. Eating well, weight is stable.  nausea/fatigue for 3-4 days after chemo and then bounces back. Compazine as prn with help.  pain has been better, stable, on hydrococodone-acetaminophen as prn,  also medical marijuana. continues gabapentin for cold dysesthesias and peripheral neuropathies with relief, stable not worse, not interfering with ADLs.       bowel movement is ok, No blood in stools or urine.  overall she feel ok.  denies SOB, fever, palpitations, dizziness, headache, chest pain, or any new sites of pain.

## 2024-07-31 NOTE — PHYSICAL EXAM
[Restricted in physically strenuous activity but ambulatory and able to carry out work of a light or sedentary nature] : Status 1- Restricted in physically strenuous activity but ambulatory and able to carry out work of a light or sedentary nature, e.g., light house work, office work [Normal] : affect appropriate [de-identified] : right chemo port in place,  no signs of infection [de-identified] : multiple surgical scars well healed.  No organomegaly.  soft and not distended.  previously palpable nodule at the superior aspect of the hepatectomy incision is no longer palpable.  no palpable masses.  not tender.  not distended.

## 2024-07-31 NOTE — HISTORY OF PRESENT ILLNESS
[de-identified] : Romy Oquendo is a 59 year old female here for f/u of metastatic colon cancer.   Oncologic history: 1. metastatic colon cancer --original diagnosis in 2018.  s/p sigmoid colon resection for a [id number] cancer on 7/11/2018 at [facility] with Dr Jimenez. --received adjuvant chemo (? folfox?) --liver metastases (x 2) identified in 12/2021.  went on chemo with Folfiri + cetuximab with Dr Oziel Paulson.  Partial response by PET.  Partial liver resection 10/6/22.  two lesions, both adenocarcinoma consistent with colonic primary.  MMR intact.  margins negative.  characterized by pathology as "minimal/no treatment response."   --NGS on the liver metastases from 2022:  BRCA-2 frameshift mutation (pathogenic);  PIK3CA [id number];  p53.  DEJUAN, TMB low. --received more chemo "adjuvant" unclear exactly what  --10/2023:  found to have recurrent cancer in the liver + possible peritoneal recurrence + ovarian mass --s/p diagnostic laparoscopy ([facility]) + L oophorectomy ([facility]).  No peritoneal carcinomatosis.  Ovary with endometrioid adenocarcinoma, moderately differentiated, arising from a background of endometriosis in the L ovary/fallopian tube.  1.8 cm tumor.   --followed by robotic hysterectomy + R salpingo-oophorectomy, and extended liver resection on 11/9/23.  Pathology from the liver consistent with metastatic colon cancer.  Pathology from the GYN specimens benign --PET scan 2/27/24:  Extensive metastatic malignancy.   multiple new hypermetabolic low attenuation lesions in the liver, c/w new liver metastases, New hypermetabolic mesenteric lymph nodes inferior to the liver, New hypermetabolic subcutaneous, intramuscular, intraperitoneal nodules associated with the anterior abdominal wall, Multiple new b/l pulm nodules, the larger ones are hypermetabolic on pet, New hypermetabolic L hilar lymphadenopathy, Increase in size and intensity of R cardiophrenic lymphadenopathy --foundation one NGS on liver metastasis resected in 11/2023:  DEJUAN.  TMB = 1 mut/mB.  KAYLA, BRAF WT.  mutations detected in APC, BRCA-2, PTEN, BTG1, CTCF, TP53 --started Folfox + bevacizumab on 4/16/24  2. endometrial adenocarcinoma - stage IA- diagnosed 10/2023 - as above.  no adjuvant therapy  3.  BRCA-2 pathogenic mutation --s/p hysterectomy + BSO.  PMH, PSH reviewed in the chart [facility] - mother with ovarian cancer, MDS. SH- lives in [place].  Single. no children.  reports good support from family, friends.  nonsmoker, denies regular ETOH [de-identified] : Here for cycle 8 Folfox + bevacizumab. C7 with around 60cc of oxaliplatin administered. Infusion stopped 2/2 chills -- possible infusion reaction?  Feels very well, no complaints at this time. No numbness/tingling. Eating well, weight is stable.  nausea/fatigue for 3-4 days after chemo and then bounces back. Compazine as prn with help.  pain has been better, stable, on hydrococodone-acetaminophen as prn,  also medical marijuana. continues gabapentin for cold dysesthesias and peripheral neuropathies with relief, stable not worse, not interfering with ADLs.       bowel movement is ok, No blood in stools or urine.  overall she feel ok.  denies SOB, fever, palpitations, dizziness, headache, chest pain, or any new sites of pain.

## 2024-07-31 NOTE — ASSESSMENT
[FreeTextEntry1] : Romy Oquendo is a 59 year old female BRCA-2 pathogenic mutation carrier with a history of sigmoid colon cancer originally resected in 7/2018.  At that time she had stage III disease.  Received adjuvant chemotherapy (presumably with Folfox although unconfirmed) but unfortunately she was found to have hepatic metastatic disease in 12/2021.  She then underwent chemotherapy with Folfiri + cetuximab for several cycles, followed by liver resection in 10/2022.  Pathology confirmed metastatic colon cancer, MMR intact.  She received some chemotherapy after surgery (? what type?).  In 10/2023 was found to have recurrent liver metastasis + a L ovarian mass.  The L ovary and Fallopian tube was resected in 10/2023 and was pathologically a new tumor, endometrial adenocarcinoma arising in a focus of endometriosis.  Complete surgical staging w/ hysterectomy + R salpingo-oophorectomy did not reveal any additional disease, rendering this a stage IA endometrial cancer not in need of any further therapy.  The liver metastasis was resected in 11/2023.  She did not receive any post-operative chemotherapy.  PET scan from 2/27/24 shows widely metastatic cancer - lung mets, liver mets, adenopathy above and below the diaphragm, and abdominal wall/peritoneal nodules.   Transitioned care from her previous oncologist 4/2024 due to insurance change.  Started Folfox + bevacizumab on 4/16//24  Plan: #. metastatic colon cancer --reviewed history with the patient at the first visit, limited records available and she was able to supplement some information.  --started Folfox + bevacizumab on 4/16/24.  repeated PET-CT 07/17 with improvement/stability of metastatic lesions and lymph nodes - no disease progression --developed rigors during last chemo infusion, ? oxaliplatin reaction.  discussed pros/cons of rechallenge over telephone last week and again today.  risks discussed.  pt remains suspicious that the occurrence during last infusion was not due to oxaliplatin but rather related to her PET scan.  She feels strongly that she would like to attempt treatment again w/ oxaliplatin.  Increased pre-meds:  dex 20 mg IV, benadryl 50 mg IV, pepcid 20 mg IV, fosaprepitant, aloxi, hold reglan. --monitor carefully during infusion..  --trend CEA, went down to 11.3,  Will repeat every 4 weeks.   #. cancer related pain-stable  --pt prefers to use tylenol #3 but has been unavailable at numerous pharmacies.  She wishes to avoid oxycodone due to previous bad reaction.  Also had poor tolerance of tramadol (nausea, drowsiness).  has Rx for hydrocodone PRN which has been helpful as well as medical marijuana.  vicodin rx renewed. --following w/ Dr Johnson  #. h/o neutropenia 2/2 chemotherapy --using udenyca intermittently w/ treatment.   does not need this cycle.  #. BRCA-2 pathogenic mutation carrier --identified on tumor NGS from 2022.  unknown if she ever had germline genetic testing.  ? parp inhibitor in future if fails standard therapies?  # chemo induced N/V --does not get good relief from Zofran.  has had emesis on day #1 of treatment.  added Reglan to chemo pre-meds and this problem has resolved.  holding today w/ inccrease in benadryl dose. --Compazine as prn with help  -- cannabis gummies helping with nausea   # chemo induced neuropathy-stable  --monitor.  Back on gabapentin BID which she feels has been beneficial  # chemo induced thrombocytopenia --plt 164 today. No bleeding/bruising after avastin last week.  --ok for chemo, monitor closely  RTC for cycle 9 of Folfox + bevacizumab on 08/14/24  labs- CBC, CMP, CEA  ADDENDUM: pt had similar reaction in infusion room this week - vomiting and diaphoresis after small amt oxaliplatin infused. Now convinced that she is manifesting hypersensitivity to this drug.  Discontinue oxaliplatin from all future treatment and manage w/ 5-FU + pita alone.

## 2024-07-31 NOTE — PHYSICAL EXAM
[Restricted in physically strenuous activity but ambulatory and able to carry out work of a light or sedentary nature] : Status 1- Restricted in physically strenuous activity but ambulatory and able to carry out work of a light or sedentary nature, e.g., light house work, office work [Normal] : affect appropriate [de-identified] : right chemo port in place,  no signs of infection [de-identified] : multiple surgical scars well healed.  No organomegaly.  soft and not distended.  previously palpable nodule at the superior aspect of the hepatectomy incision is no longer palpable.  no palpable masses.  not tender.  not distended.

## 2024-07-31 NOTE — PHYSICAL EXAM
[Restricted in physically strenuous activity but ambulatory and able to carry out work of a light or sedentary nature] : Status 1- Restricted in physically strenuous activity but ambulatory and able to carry out work of a light or sedentary nature, e.g., light house work, office work [Normal] : affect appropriate [de-identified] : right chemo port in place,  no signs of infection [de-identified] : multiple surgical scars well healed.  No organomegaly.  soft and not distended.  previously palpable nodule at the superior aspect of the hepatectomy incision is no longer palpable.  no palpable masses.  not tender.  not distended.

## 2024-07-31 NOTE — END OF VISIT
[] : Fellow [FreeTextEntry3] :  I evaluated the patient w/ the Oncology fellow, Dr Reich.  Plan of care as documented above;  I have edited the note to add my own impressions of the care plan.

## 2024-07-31 NOTE — ASSESSMENT
[FreeTextEntry1] : Romy Oquendo is a 59 year old female BRCA-2 pathogenic mutation carrier with a history of sigmoid colon cancer originally resected in 7/2018.  At that time she had stage III disease.  Received adjuvant chemotherapy (presumably with Folfox although unconfirmed) but unfortunately she was found to have hepatic metastatic disease in 12/2021.  She then underwent chemotherapy with Folfiri + cetuximab for several cycles, followed by liver resection in 10/2022.  Pathology confirmed metastatic colon cancer, MMR intact.  She received some chemotherapy after surgery (? what type?).  In 10/2023 was found to have recurrent liver metastasis + a L ovarian mass.  The L ovary and Fallopian tube was resected in 10/2023 and was pathologically a new tumor, endometrial adenocarcinoma arising in a focus of endometriosis.  Complete surgical staging w/ hysterectomy + R salpingo-oophorectomy did not reveal any additional disease, rendering this a stage IA endometrial cancer not in need of any further therapy.  The liver metastasis was resected in 11/2023.  She did not receive any post-operative chemotherapy.  PET scan from 2/27/24 shows widely metastatic cancer - lung mets, liver mets, adenopathy above and below the diaphragm, and abdominal wall/peritoneal nodules.   Transitioned care from her previous oncologist 4/2024 due to insurance change.  Started Folfox + bevacizumab on 4/16//24  Plan: #. metastatic colon cancer --reviewed history with the patient at the first visit, limited records available and she was able to supplement some information.  --started Folfox + bevacizumab on 4/16/24.  repeated PET-CT 07/17 with improvement/stability of metastatic lesions and lymph nodes - no disease progression --developed rigors during last chemo infusion, ? oxaliplatin reaction.  discussed pros/cons of rechallenge over telephone last week and again today.  risks discussed.  pt remains suspicious that the occurrence during last infusion was not due to oxaliplatin but rather related to her PET scan.  She feels strongly that she would like to attempt treatment again w/ oxaliplatin.  Increased pre-meds:  dex 20 mg IV, benadryl 50 mg IV, pepcid 20 mg IV, fosaprepitant, aloxi, hold reglan. --monitor carefully during infusion..  --trend CEA, went down to 11.3,  Will repeat every 4 weeks.   #. cancer related pain-stable  --pt prefers to use tylenol #3 but has been unavailable at numerous pharmacies.  She wishes to avoid oxycodone due to previous bad reaction.  Also had poor tolerance of tramadol (nausea, drowsiness).  has Rx for hydrocodone PRN which has been helpful as well as medical marijuana.  vicodin rx renewed. --following w/ Dr Johnson  #. h/o neutropenia 2/2 chemotherapy --using udenyca intermittently w/ treatment.   does not need this cycle.  #. BRCA-2 pathogenic mutation carrier --identified on tumor NGS from 2022.  unknown if she ever had germline genetic testing.  ? parp inhibitor in future if fails standard therapies?  # chemo induced N/V --does not get good relief from Zofran.  has had emesis on day #1 of treatment.  added Reglan to chemo pre-meds and this problem has resolved.  holding today w/ inccrease in benadryl dose. --Compazine as prn with help  -- cannabis gummies helping with nausea   # chemo induced neuropathy-stable  --monitor.  Back on gabapentin BID which she feels has been beneficial  # chemo induced thrombocytopenia --plt 164 today. No bleeding/bruising after avastin last week.  --ok for chemo, monitor closely  RTC for cycle 9 of Folfox + bevacizumab on 08/14/24  labs- CBC, CMP, CEA  ADDENDUM: pt had similar reaction in infusion room this week - vomiting and diaphoresis after small amt oxaliplatin infused. Now convinced that she is manifesting hypersensitivity to this drug.  Discontinue oxaliplatin from all future treatment and manage w/ 5-FU + pita alone. Yes

## 2024-07-31 NOTE — PHARMACY COMMUNICATION NOTE - COMMENTS
Per Noemi Lion Udenyca to be held on Friday, 8/2/24. Adjusted order given to pharmacy.
Per Dr. Peña's Heme Onc note in [employer] from 7/31/2024: "pt had similar reaction in infusion room this week - vomiting and diaphoresis after small amt oxaliplatin infused. Now convinced that she is manifesting hypersensitivity to this drug. Discontinue oxaliplatin from all future treatment and manage w/ 5-FU + pita alone."    To Nurses and Pharmacists: Ensure Cycle 9 and subsequent cycles do NOT include oxaliplatin.

## 2024-08-01 DIAGNOSIS — C18.9 MALIGNANT NEOPLASM OF COLON, UNSPECIFIED: ICD-10-CM

## 2024-08-02 ENCOUNTER — APPOINTMENT (OUTPATIENT)
Dept: INFUSION THERAPY | Facility: CLINIC | Age: 59
End: 2024-08-02

## 2024-08-02 ENCOUNTER — OUTPATIENT (OUTPATIENT)
Dept: OUTPATIENT SERVICES | Facility: HOSPITAL | Age: 59
LOS: 1 days | End: 2024-08-02
Payer: COMMERCIAL

## 2024-08-02 VITALS
WEIGHT: 169.98 LBS | DIASTOLIC BLOOD PRESSURE: 78 MMHG | HEIGHT: 67 IN | OXYGEN SATURATION: 97 % | HEART RATE: 88 BPM | TEMPERATURE: 98 F | SYSTOLIC BLOOD PRESSURE: 113 MMHG | RESPIRATION RATE: 18 BRPM

## 2024-08-02 DIAGNOSIS — Z98.890 OTHER SPECIFIED POSTPROCEDURAL STATES: Chronic | ICD-10-CM

## 2024-08-02 DIAGNOSIS — Z90.49 ACQUIRED ABSENCE OF OTHER SPECIFIED PARTS OF DIGESTIVE TRACT: Chronic | ICD-10-CM

## 2024-08-02 DIAGNOSIS — C18.9 MALIGNANT NEOPLASM OF COLON, UNSPECIFIED: ICD-10-CM

## 2024-08-02 DIAGNOSIS — Z90.89 ACQUIRED ABSENCE OF OTHER ORGANS: Chronic | ICD-10-CM

## 2024-08-02 DIAGNOSIS — Z90.79 ACQUIRED ABSENCE OF OTHER GENITAL ORGAN(S): Chronic | ICD-10-CM

## 2024-08-02 LAB
ALBUMIN SERPL ELPH-MCNC: 3.8 G/DL
ALP BLD-CCNC: 156 U/L
ALT SERPL-CCNC: 31 U/L
ANION GAP SERPL CALC-SCNC: 7 MMOL/L
AST SERPL-CCNC: 36 U/L
BILIRUB SERPL-MCNC: 0.7 MG/DL
BUN SERPL-MCNC: 20 MG/DL
CALCIUM SERPL-MCNC: 10 MG/DL
CHLORIDE SERPL-SCNC: 110 MMOL/L
CO2 SERPL-SCNC: 24 MMOL/L
CREAT SERPL-MCNC: 0.7 MG/DL
EGFR: 101 ML/MIN/1.73M2
GLUCOSE SERPL-MCNC: 110 MG/DL
HCT VFR BLD CALC: 37.6 %
HGB BLD-MCNC: 12.4 G/DL
LYMPHOCYTES # BLD AUTO: 1.6 K/UL
LYMPHOCYTES NFR BLD AUTO: 23.4 %
MAN DIFF?: NO
MCHC RBC-ENTMCNC: 32.4 PG
MCHC RBC-ENTMCNC: 33 GM/DL
MCV RBC AUTO: 98.2 FL
NEUTROPHILS # BLD AUTO: 4.8 K/UL
NEUTROPHILS NFR BLD AUTO: 71 %
PLATELET # BLD AUTO: 164 K/UL
POTASSIUM SERPL-SCNC: 4.2 MMOL/L
PROT SERPL-MCNC: 7.7 G/DL
RBC # BLD: 3.83 M/UL
RBC # FLD: 19.3 %
SODIUM SERPL-SCNC: 141 MMOL/L
WBC # FLD AUTO: 6.8 K/UL

## 2024-08-02 PROCEDURE — 96523 IRRIG DRUG DELIVERY DEVICE: CPT

## 2024-08-02 RX ORDER — BACTERIOSTATIC SODIUM CHLORIDE 0.9 %
10 VIAL (ML) INJECTION ONCE
Refills: 0 | Status: COMPLETED | OUTPATIENT
Start: 2024-08-02 | End: 2024-08-02

## 2024-08-02 RX ADMIN — Medication 10 MILLILITER(S): at 13:47

## 2024-08-14 ENCOUNTER — APPOINTMENT (OUTPATIENT)
Dept: HEMATOLOGY ONCOLOGY | Facility: CLINIC | Age: 59
End: 2024-08-14
Payer: MEDICAID

## 2024-08-14 ENCOUNTER — APPOINTMENT (OUTPATIENT)
Dept: INFUSION THERAPY | Facility: CLINIC | Age: 59
End: 2024-08-14

## 2024-08-14 VITALS
SYSTOLIC BLOOD PRESSURE: 109 MMHG | OXYGEN SATURATION: 98 % | HEIGHT: 67 IN | HEART RATE: 83 BPM | RESPIRATION RATE: 18 BRPM | TEMPERATURE: 97.9 F | WEIGHT: 169.5 LBS | BODY MASS INDEX: 26.6 KG/M2 | DIASTOLIC BLOOD PRESSURE: 74 MMHG

## 2024-08-14 DIAGNOSIS — T45.1X5A OTHER SECONDARY THROMBOCYTOPENIA: ICD-10-CM

## 2024-08-14 DIAGNOSIS — G62.0 DRUG-INDUCED POLYNEUROPATHY: ICD-10-CM

## 2024-08-14 DIAGNOSIS — T45.1X5A DRUG-INDUCED POLYNEUROPATHY: ICD-10-CM

## 2024-08-14 DIAGNOSIS — G89.3 NEOPLASM RELATED PAIN (ACUTE) (CHRONIC): ICD-10-CM

## 2024-08-14 DIAGNOSIS — C78.7 MALIGNANT NEOPLASM OF COLON, UNSPECIFIED: ICD-10-CM

## 2024-08-14 DIAGNOSIS — D69.59 OTHER SECONDARY THROMBOCYTOPENIA: ICD-10-CM

## 2024-08-14 DIAGNOSIS — R11.2 NAUSEA WITH VOMITING, UNSPECIFIED: ICD-10-CM

## 2024-08-14 DIAGNOSIS — C18.9 MALIGNANT NEOPLASM OF COLON, UNSPECIFIED: ICD-10-CM

## 2024-08-14 DIAGNOSIS — T45.1X5A NAUSEA WITH VOMITING, UNSPECIFIED: ICD-10-CM

## 2024-08-14 LAB
ALBUMIN SERPL ELPH-MCNC: 3.5 G/DL
ALP BLD-CCNC: 123 U/L
ALT SERPL-CCNC: 32 U/L
ANION GAP SERPL CALC-SCNC: 11 MMOL/L
AST SERPL-CCNC: 36 U/L
BILIRUB SERPL-MCNC: 0.8 MG/DL
BUN SERPL-MCNC: 14 MG/DL
CALCIUM SERPL-MCNC: 9.8 MG/DL
CHLORIDE SERPL-SCNC: 110 MMOL/L
CO2 SERPL-SCNC: 27 MMOL/L
CREAT SERPL-MCNC: 0.6 MG/DL
EGFR: 105 ML/MIN/1.73M2
GLUCOSE SERPL-MCNC: 141 MG/DL
HCT VFR BLD CALC: 38.1 %
HGB BLD-MCNC: 12.1 G/DL
LYMPHOCYTES # BLD AUTO: 1.3 K/UL
LYMPHOCYTES NFR BLD AUTO: 33.9 %
MAN DIFF?: NO
MCHC RBC-ENTMCNC: 31.7 PG
MCHC RBC-ENTMCNC: 31.8 GM/DL
MCV RBC AUTO: 99.7 FL
NEUTROPHILS # BLD AUTO: 2 K/UL
NEUTROPHILS NFR BLD AUTO: 53.8 %
PLATELET # BLD AUTO: 85 K/UL
POTASSIUM SERPL-SCNC: 4.3 MMOL/L
PROT SERPL-MCNC: 6.8 G/DL
RBC # BLD: 3.82 M/UL
RBC # FLD: 17.9 %
SODIUM SERPL-SCNC: 148 MMOL/L
WBC # FLD AUTO: 3.8 K/UL

## 2024-08-14 PROCEDURE — 99214 OFFICE O/P EST MOD 30 MIN: CPT

## 2024-08-14 PROCEDURE — G2211 COMPLEX E/M VISIT ADD ON: CPT | Mod: NC,1L

## 2024-08-16 ENCOUNTER — APPOINTMENT (OUTPATIENT)
Dept: INFUSION THERAPY | Facility: CLINIC | Age: 59
End: 2024-08-16

## 2024-08-19 LAB — CEA SERPL-MCNC: 9.7 NG/ML

## 2024-08-19 NOTE — HISTORY OF PRESENT ILLNESS
[de-identified] : Romy Oquendo is a 59 year old female here for f/u of metastatic colon cancer.   Oncologic history: 1. metastatic colon cancer --original diagnosis in 2018.  s/p sigmoid colon resection for a [id number] cancer on 7/11/2018 at [facility] with Dr Jimenez. --received adjuvant chemo (? folfox?) --liver metastases (x 2) identified in 12/2021.  went on chemo with Folfiri + cetuximab with Dr Oziel Paulson.  Partial response by PET.  Partial liver resection 10/6/22.  two lesions, both adenocarcinoma consistent with colonic primary.  MMR intact.  margins negative.  characterized by pathology as "minimal/no treatment response."   --NGS on the liver metastases from 2022:  BRCA-2 frameshift mutation (pathogenic);  PIK3CA [id number];  p53.  DEJUAN, TMB low. --received more chemo "adjuvant" unclear exactly what  --10/2023:  found to have recurrent cancer in the liver + possible peritoneal recurrence + ovarian mass --s/p diagnostic laparoscopy ([facility]) + L oophorectomy ([facility]).  No peritoneal carcinomatosis.  Ovary with endometrioid adenocarcinoma, moderately differentiated, arising from a background of endometriosis in the L ovary/fallopian tube.  1.8 cm tumor.   --followed by robotic hysterectomy + R salpingo-oophorectomy, and extended liver resection on 11/9/23.  Pathology from the liver consistent with metastatic colon cancer.  Pathology from the GYN specimens benign --PET scan 2/27/24:  Extensive metastatic malignancy.   multiple new hypermetabolic low attenuation lesions in the liver, c/w new liver metastases, New hypermetabolic mesenteric lymph nodes inferior to the liver, New hypermetabolic subcutaneous, intramuscular, intraperitoneal nodules associated with the anterior abdominal wall, Multiple new b/l pulm nodules, the larger ones are hypermetabolic on pet, New hypermetabolic L hilar lymphadenopathy, Increase in size and intensity of R cardiophrenic lymphadenopathy --foundation one NGS on liver metastasis resected in 11/2023:  DEJUAN.  TMB = 1 mut/mB.  KAYLA, BRAF WT.  mutations detected in APC, BRCA-2, PTEN, BTG1, CTCF, TP53 --started Folfox + bevacizumab on 4/16/24 -- PET/CT on 7/17/2024 with improvement/stability of metastatic lesions and lymph nodes - no disease progression  2. endometrial adenocarcinoma - stage IA- diagnosed 10/2023 - as above.  no adjuvant therapy  3.  BRCA-2 pathogenic mutation --s/p hysterectomy + BSO.  PMH, PSH reviewed in the chart [facility] - mother with ovarian cancer, MDS. SH- lives in [place].  Single. no children.  reports good support from family, friends.  nonsmoker, denies regular ETOH [de-identified] : Here for cycle 9 5-FU + bevacizumab.  she had similar reaction in infusion room when she received cycle 8 with additional premedications  - vomiting and diaphoresis after small amt oxaliplatin infused. Oxaliplatin discontinued and she received 5 FU + bevacizumab.  she reports increased abdominal pain, taking  medical marijuana gummies, hydrocodone/tylenol  daily with help.  nausea/fatigue for 3-4 days after chemo and then bounces back. Compazine as prn with help.  continues gabapentin for mild peripheral neuropathies with relief, stable not worse, not interfering with ADLs.       bowel movement is ok, No blood in stools or urine.  otherwise, she feel ok.  denies SOB, fever, palpitations, dizziness, headache, chest pain, or any new sites of pain.

## 2024-08-19 NOTE — HISTORY OF PRESENT ILLNESS
[de-identified] : Romy Oquendo is a 59 year old female here for f/u of metastatic colon cancer.   Oncologic history: 1. metastatic colon cancer --original diagnosis in 2018.  s/p sigmoid colon resection for a [id number] cancer on 7/11/2018 at [facility] with Dr Jimenez. --received adjuvant chemo (? folfox?) --liver metastases (x 2) identified in 12/2021.  went on chemo with Folfiri + cetuximab with Dr Oziel Paulson.  Partial response by PET.  Partial liver resection 10/6/22.  two lesions, both adenocarcinoma consistent with colonic primary.  MMR intact.  margins negative.  characterized by pathology as "minimal/no treatment response."   --NGS on the liver metastases from 2022:  BRCA-2 frameshift mutation (pathogenic);  PIK3CA [id number];  p53.  DEJUAN, TMB low. --received more chemo "adjuvant" unclear exactly what  --10/2023:  found to have recurrent cancer in the liver + possible peritoneal recurrence + ovarian mass --s/p diagnostic laparoscopy ([facility]) + L oophorectomy ([facility]).  No peritoneal carcinomatosis.  Ovary with endometrioid adenocarcinoma, moderately differentiated, arising from a background of endometriosis in the L ovary/fallopian tube.  1.8 cm tumor.   --followed by robotic hysterectomy + R salpingo-oophorectomy, and extended liver resection on 11/9/23.  Pathology from the liver consistent with metastatic colon cancer.  Pathology from the GYN specimens benign --PET scan 2/27/24:  Extensive metastatic malignancy.   multiple new hypermetabolic low attenuation lesions in the liver, c/w new liver metastases, New hypermetabolic mesenteric lymph nodes inferior to the liver, New hypermetabolic subcutaneous, intramuscular, intraperitoneal nodules associated with the anterior abdominal wall, Multiple new b/l pulm nodules, the larger ones are hypermetabolic on pet, New hypermetabolic L hilar lymphadenopathy, Increase in size and intensity of R cardiophrenic lymphadenopathy --foundation one NGS on liver metastasis resected in 11/2023:  DEJUAN.  TMB = 1 mut/mB.  KAYLA, BRAF WT.  mutations detected in APC, BRCA-2, PTEN, BTG1, CTCF, TP53 --started Folfox + bevacizumab on 4/16/24 -- PET/CT on 7/17/2024 with improvement/stability of metastatic lesions and lymph nodes - no disease progression  2. endometrial adenocarcinoma - stage IA- diagnosed 10/2023 - as above.  no adjuvant therapy  3.  BRCA-2 pathogenic mutation --s/p hysterectomy + BSO.  PMH, PSH reviewed in the chart [facility] - mother with ovarian cancer, MDS. SH- lives in [place].  Single. no children.  reports good support from family, friends.  nonsmoker, denies regular ETOH [de-identified] : Here for cycle 9 5-FU + bevacizumab.  she had similar reaction in infusion room when she received cycle 8 with additional premedications  - vomiting and diaphoresis after small amt oxaliplatin infused. Oxaliplatin discontinued and she received 5 FU + bevacizumab.  she reports increased abdominal pain, taking  medical marijuana gummies, hydrocodone/tylenol  daily with help.  nausea/fatigue for 3-4 days after chemo and then bounces back. Compazine as prn with help.  continues gabapentin for mild peripheral neuropathies with relief, stable not worse, not interfering with ADLs.       bowel movement is ok, No blood in stools or urine.  otherwise, she feel ok.  denies SOB, fever, palpitations, dizziness, headache, chest pain, or any new sites of pain.

## 2024-08-19 NOTE — PHYSICAL EXAM
[Restricted in physically strenuous activity but ambulatory and able to carry out work of a light or sedentary nature] : Status 1- Restricted in physically strenuous activity but ambulatory and able to carry out work of a light or sedentary nature, e.g., light house work, office work [Normal] : affect appropriate [de-identified] : right chemo port in place,  no signs of infection [de-identified] : multiple surgical scars well healed.  No organomegaly.  soft and not distended.  previously palpable nodule at the superior aspect of the hepatectomy incision is no longer palpable.  no palpable masses.  not tender.  not distended.

## 2024-08-19 NOTE — ASSESSMENT
[FreeTextEntry1] : Romy Oquendo is a 59 year old female BRCA-2 pathogenic mutation carrier with a history of sigmoid colon cancer originally resected in 7/2018.  At that time she had stage III disease.  Received adjuvant chemotherapy (presumably with Folfox although unconfirmed) but unfortunately she was found to have hepatic metastatic disease in 12/2021.  She then underwent chemotherapy with Folfiri + cetuximab for several cycles, followed by liver resection in 10/2022.  Pathology confirmed metastatic colon cancer, MMR intact.  She received some chemotherapy after surgery (? what type?).  In 10/2023 was found to have recurrent liver metastasis + a L ovarian mass.  The L ovary and Fallopian tube was resected in 10/2023 and was pathologically a new tumor, endometrial adenocarcinoma arising in a focus of endometriosis.  Complete surgical staging w/ hysterectomy + R salpingo-oophorectomy did not reveal any additional disease, rendering this a stage IA endometrial cancer not in need of any further therapy.  The liver metastasis was resected in 11/2023.  She did not receive any post-operative chemotherapy.  PET scan from 2/27/24 shows widely metastatic cancer - lung mets, liver mets, adenopathy above and below the diaphragm, and abdominal wall/peritoneal nodules.   Transitioned care from her previous oncologist 4/2024 due to insurance change.  Started Folfox + bevacizumab on 4/16//24  Due to hypersensitivity to Oxaliplatin, Oxaliplatin discontinued after cycle 7 and continue 5 FU + bevacizumab   Plan: #. metastatic colon cancer --reviewed history with the patient at the first visit, limited records available and she was able to supplement some information.  --started Folfox + bevacizumab on 4/16/24.  repeated PET-CT 07/17/24 with improvement/stability of metastatic lesions and lymph nodes - no disease progression --developed rigors during cycle 7 of chemo infusion, ? oxaliplatin reaction vs PET/CT ?  We reattempted cycle 8 Folfox + bevacizumab with  Increased pre-meds:  dex 20 mg IV, benadryl 50 mg IV, pepcid 20 mg IV, fosaprepitant, aloxi, However, pt had similar reaction in infusion room during cycle 8- vomiting and diaphoresis after small amt oxaliplatin infused. Now convinced that she is manifesting hypersensitivity to this drug. Oxaliplatin discontinued and she tolerated cycle 8 of 5FU+bevacizumab. We had a long discussion about treatment, continue  5FU+bevacizumab vs folfiri+ bevacizumab. As CEA is down trending w/o Oxaliplatin, we would continue  5FU+bevacizumab at this time and monitor CEA. If CEA is rising or any worsening symptoms, we will repeat imaging and consider switching to folfiri+ bevacizumab. --labs reviewed, CBC with thrombocytopenia. Ok for cycle 9  5-FU + pita today.  --trend CEA, went down to 9.7 today,  Will repeat every 4 weeks.   #. cancer related pain --pt prefers to use tylenol #3 but has been unavailable at numerous pharmacies.  She wishes to avoid oxycodone due to previous bad reaction.  Also had poor tolerance of tramadol (nausea, drowsiness).  --abdominal pain slightly increased, taking medical marijuana gummies and Vicodin daily with relief.  Vicodin rx renewed. --following w/ Dr. Johnson  #. BRCA-2 pathogenic mutation carrier --identified on tumor NGS from 2022.  unknown if she ever had germline genetic testing.  ? parp inhibitor in future if fails standard therapies?  # chemo induced N/V --does not get good relief from Zofran, added Reglan to chemo pre-meds  --Compazine as prn with help  --cannabis gummies helping with nausea   # chemo induced neuropathy-stable  --monitor.  Back on gabapentin BID which she feels has been beneficial  # chemo induced thrombocytopenia --plt 85 today. No bleeding/bruising after Avastin last week.  --ok for chemo, monitor closely  # hypersensitivity to Oxaliplatin  --discontinue oxaliplatin from all future treatment  I saw and evaluated the patient with Dr. Peña  [facility] for cycle 10 of 5 FU + bevacizumab on 8/28/24 labs- CBC, CMP, U/A for Avastin

## 2024-08-19 NOTE — ASSESSMENT
[FreeTextEntry1] : Romy Oquendo is a 59 year old female BRCA-2 pathogenic mutation carrier with a history of sigmoid colon cancer originally resected in 7/2018.  At that time she had stage III disease.  Received adjuvant chemotherapy (presumably with Folfox although unconfirmed) but unfortunately she was found to have hepatic metastatic disease in 12/2021.  She then underwent chemotherapy with Folfiri + cetuximab for several cycles, followed by liver resection in 10/2022.  Pathology confirmed metastatic colon cancer, MMR intact.  She received some chemotherapy after surgery (? what type?).  In 10/2023 was found to have recurrent liver metastasis + a L ovarian mass.  The L ovary and Fallopian tube was resected in 10/2023 and was pathologically a new tumor, endometrial adenocarcinoma arising in a focus of endometriosis.  Complete surgical staging w/ hysterectomy + R salpingo-oophorectomy did not reveal any additional disease, rendering this a stage IA endometrial cancer not in need of any further therapy.  The liver metastasis was resected in 11/2023.  She did not receive any post-operative chemotherapy.  PET scan from 2/27/24 shows widely metastatic cancer - lung mets, liver mets, adenopathy above and below the diaphragm, and abdominal wall/peritoneal nodules.   Transitioned care from her previous oncologist 4/2024 due to insurance change.  Started Folfox + bevacizumab on 4/16//24  Due to hypersensitivity to Oxaliplatin, Oxaliplatin discontinued after cycle 7 and continue 5 FU + bevacizumab   Plan: #. metastatic colon cancer --reviewed history with the patient at the first visit, limited records available and she was able to supplement some information.  --started Folfox + bevacizumab on 4/16/24.  repeated PET-CT 07/17/24 with improvement/stability of metastatic lesions and lymph nodes - no disease progression --developed rigors during cycle 7 of chemo infusion, ? oxaliplatin reaction vs PET/CT ?  We reattempted cycle 8 Folfox + bevacizumab with  Increased pre-meds:  dex 20 mg IV, benadryl 50 mg IV, pepcid 20 mg IV, fosaprepitant, aloxi, However, pt had similar reaction in infusion room during cycle 8- vomiting and diaphoresis after small amt oxaliplatin infused. Now convinced that she is manifesting hypersensitivity to this drug. Oxaliplatin discontinued and she tolerated cycle 8 of 5FU+bevacizumab. We had a long discussion about treatment, continue  5FU+bevacizumab vs folfiri+ bevacizumab. As CEA is down trending w/o Oxaliplatin, we would continue  5FU+bevacizumab at this time and monitor CEA. If CEA is rising or any worsening symptoms, we will repeat imaging and consider switching to folfiri+ bevacizumab. --labs reviewed, CBC with thrombocytopenia. Ok for cycle 9  5-FU + pita today.  --trend CEA, went down to 9.7 today,  Will repeat every 4 weeks.   #. cancer related pain --pt prefers to use tylenol #3 but has been unavailable at numerous pharmacies.  She wishes to avoid oxycodone due to previous bad reaction.  Also had poor tolerance of tramadol (nausea, drowsiness).  --abdominal pain slightly increased, taking medical marijuana gummies and Vicodin daily with relief.  Vicodin rx renewed. --following w/ Dr. Johnson  #. BRCA-2 pathogenic mutation carrier --identified on tumor NGS from 2022.  unknown if she ever had germline genetic testing.  ? parp inhibitor in future if fails standard therapies?  # chemo induced N/V --does not get good relief from Zofran, added Reglan to chemo pre-meds  --Compazine as prn with help  --cannabis gummies helping with nausea   # chemo induced neuropathy-stable  --monitor.  Back on gabapentin BID which she feels has been beneficial  # chemo induced thrombocytopenia --plt 85 today. No bleeding/bruising after Avastin last week.  --ok for chemo, monitor closely  # hypersensitivity to Oxaliplatin  --discontinue oxaliplatin from all future treatment  I saw and evaluated the patient with Dr. Peña  [facility] for cycle 10 of 5 FU + bevacizumab on 8/28/24 labs- CBC, CMP, U/A for Avastin

## 2024-08-19 NOTE — PHYSICAL EXAM
[Restricted in physically strenuous activity but ambulatory and able to carry out work of a light or sedentary nature] : Status 1- Restricted in physically strenuous activity but ambulatory and able to carry out work of a light or sedentary nature, e.g., light house work, office work [Normal] : affect appropriate [de-identified] : multiple surgical scars well healed.  No organomegaly.  soft and not distended.  previously palpable nodule at the superior aspect of the hepatectomy incision is no longer palpable.  no palpable masses.  not tender.  not distended.   [de-identified] : right chemo port in place,  no signs of infection

## 2024-08-28 ENCOUNTER — NON-APPOINTMENT (OUTPATIENT)
Age: 59
End: 2024-08-28

## 2024-08-28 ENCOUNTER — LABORATORY RESULT (OUTPATIENT)
Age: 59
End: 2024-08-28

## 2024-08-28 ENCOUNTER — APPOINTMENT (OUTPATIENT)
Dept: HEMATOLOGY ONCOLOGY | Facility: CLINIC | Age: 59
End: 2024-08-28
Payer: MEDICAID

## 2024-08-28 ENCOUNTER — APPOINTMENT (OUTPATIENT)
Dept: INFUSION THERAPY | Facility: CLINIC | Age: 59
End: 2024-08-28

## 2024-08-28 ENCOUNTER — OUTPATIENT (OUTPATIENT)
Dept: OUTPATIENT SERVICES | Facility: HOSPITAL | Age: 59
LOS: 1 days | End: 2024-08-28
Payer: COMMERCIAL

## 2024-08-28 VITALS
DIASTOLIC BLOOD PRESSURE: 74 MMHG | TEMPERATURE: 98 F | RESPIRATION RATE: 17 BRPM | OXYGEN SATURATION: 99 % | HEART RATE: 84 BPM | SYSTOLIC BLOOD PRESSURE: 110 MMHG

## 2024-08-28 VITALS
DIASTOLIC BLOOD PRESSURE: 71 MMHG | OXYGEN SATURATION: 98 % | TEMPERATURE: 97.4 F | HEART RATE: 99 BPM | BODY MASS INDEX: 26.32 KG/M2 | SYSTOLIC BLOOD PRESSURE: 103 MMHG | WEIGHT: 167.7 LBS | HEIGHT: 67 IN

## 2024-08-28 VITALS
TEMPERATURE: 98 F | HEART RATE: 80 BPM | SYSTOLIC BLOOD PRESSURE: 108 MMHG | WEIGHT: 166.89 LBS | HEIGHT: 67 IN | RESPIRATION RATE: 16 BRPM | DIASTOLIC BLOOD PRESSURE: 72 MMHG | OXYGEN SATURATION: 98 %

## 2024-08-28 DIAGNOSIS — D69.59 OTHER SECONDARY THROMBOCYTOPENIA: ICD-10-CM

## 2024-08-28 DIAGNOSIS — Z98.890 OTHER SPECIFIED POSTPROCEDURAL STATES: Chronic | ICD-10-CM

## 2024-08-28 DIAGNOSIS — C18.9 MALIGNANT NEOPLASM OF COLON, UNSPECIFIED: ICD-10-CM

## 2024-08-28 DIAGNOSIS — Z90.89 ACQUIRED ABSENCE OF OTHER ORGANS: Chronic | ICD-10-CM

## 2024-08-28 DIAGNOSIS — Z90.49 ACQUIRED ABSENCE OF OTHER SPECIFIED PARTS OF DIGESTIVE TRACT: Chronic | ICD-10-CM

## 2024-08-28 DIAGNOSIS — Z90.79 ACQUIRED ABSENCE OF OTHER GENITAL ORGAN(S): Chronic | ICD-10-CM

## 2024-08-28 DIAGNOSIS — C78.7 MALIGNANT NEOPLASM OF COLON, UNSPECIFIED: ICD-10-CM

## 2024-08-28 DIAGNOSIS — T45.1X5A OTHER SECONDARY THROMBOCYTOPENIA: ICD-10-CM

## 2024-08-28 LAB
ALBUMIN SERPL ELPH-MCNC: 3.6 G/DL
ALP BLD-CCNC: 110 U/L
ALT SERPL-CCNC: 34 U/L
ANION GAP SERPL CALC-SCNC: -3 MMOL/L
APPEARANCE: ABNORMAL
AST SERPL-CCNC: 40 U/L
BILIRUB SERPL-MCNC: 0.9 MG/DL
BILIRUBIN URINE: NEGATIVE
BLOOD URINE: NEGATIVE
BUN SERPL-MCNC: 17 MG/DL
CALCIUM SERPL-MCNC: 9.9 MG/DL
CHLORIDE SERPL-SCNC: 115 MMOL/L
CO2 SERPL-SCNC: 28 MMOL/L
COLOR: YELLOW
CREAT SERPL-MCNC: 0.9 MG/DL
EGFR: 75 ML/MIN/1.73M2
GLUCOSE QUALITATIVE U: NEGATIVE MG/DL
GLUCOSE SERPL-MCNC: 143 MG/DL
HCT VFR BLD CALC: 39.5 %
HGB BLD-MCNC: 12.9 G/DL
KETONES URINE: NEGATIVE MG/DL
LEUKOCYTE ESTERASE URINE: ABNORMAL
LYMPHOCYTES # BLD AUTO: 1.9 K/UL
LYMPHOCYTES NFR BLD AUTO: 32.1 %
MAN DIFF?: NO
MCHC RBC-ENTMCNC: 32.4 PG
MCHC RBC-ENTMCNC: 32.7 GM/DL
MCV RBC AUTO: 99.2 FL
NEUTROPHILS # BLD AUTO: 3.4 K/UL
NEUTROPHILS NFR BLD AUTO: 57 %
NITRITE URINE: NEGATIVE
PH URINE: 5.5
PLATELET # BLD AUTO: 106 K/UL
POTASSIUM SERPL-SCNC: 4.6 MMOL/L
PROT SERPL-MCNC: 7 G/DL
PROTEIN URINE: NEGATIVE MG/DL
RBC # BLD: 3.98 M/UL
RBC # FLD: 17.2 %
SODIUM SERPL-SCNC: 140 MMOL/L
SPECIFIC GRAVITY URINE: 1.02
UROBILINOGEN URINE: 1 MG/DL
WBC # FLD AUTO: 6 K/UL

## 2024-08-28 PROCEDURE — 96416 CHEMO PROLONG INFUSE W/PUMP: CPT

## 2024-08-28 PROCEDURE — 96367 TX/PROPH/DG ADDL SEQ IV INF: CPT

## 2024-08-28 PROCEDURE — 99214 OFFICE O/P EST MOD 30 MIN: CPT | Mod: 25

## 2024-08-28 PROCEDURE — 96375 TX/PRO/DX INJ NEW DRUG ADDON: CPT

## 2024-08-28 PROCEDURE — 96411 CHEMO IV PUSH ADDL DRUG: CPT

## 2024-08-28 RX ORDER — LIDOCAINE/BENZALKONIUM/ALCOHOL
1 SOLUTION, NON-ORAL TOPICAL ONCE
Refills: 0 | Status: COMPLETED | OUTPATIENT
Start: 2024-08-28 | End: 2024-08-28

## 2024-08-28 RX ORDER — PALONOSETRON HYDROCHLORIDE 0.25 MG/5ML
0.25 INJECTION INTRAVENOUS ONCE
Refills: 0 | Status: COMPLETED | OUTPATIENT
Start: 2024-08-28 | End: 2024-08-28

## 2024-08-28 RX ORDER — BEVACIZUMAB-ADCD 400 MG/16ML
380 INJECTION, SOLUTION INTRAVENOUS ONCE
Refills: 0 | Status: COMPLETED | OUTPATIENT
Start: 2024-08-28 | End: 2024-08-28

## 2024-08-28 RX ORDER — DEXAMETHASONE 0.75 MG
10 TABLET ORAL ONCE
Refills: 0 | Status: COMPLETED | OUTPATIENT
Start: 2024-08-28 | End: 2024-08-28

## 2024-08-28 RX ORDER — FLUOROURACIL 50 MG/ML
4530 VIAL (ML) INTRAVENOUS ONCE
Refills: 0 | Status: COMPLETED | OUTPATIENT
Start: 2024-08-28 | End: 2024-08-28

## 2024-08-28 RX ORDER — METOCLOPRAMIDE HCL 5 MG
10 TABLET ORAL ONCE
Refills: 0 | Status: COMPLETED | OUTPATIENT
Start: 2024-08-28 | End: 2024-08-28

## 2024-08-28 RX ORDER — LEUCOVORIN CALCIUM 100 MG/10ML
750 INJECTION, POWDER, LYOPHILIZED, FOR SOLUTION INTRAMUSCULAR; INTRAVENOUS ONCE
Refills: 0 | Status: COMPLETED | OUTPATIENT
Start: 2024-08-28 | End: 2024-08-28

## 2024-08-28 RX ORDER — FLUOROURACIL 50 MG/ML
750 VIAL (ML) INTRAVENOUS ONCE
Refills: 0 | Status: COMPLETED | OUTPATIENT
Start: 2024-08-28 | End: 2024-08-28

## 2024-08-28 RX ADMIN — Medication 4530 MILLIGRAM(S): at 17:17

## 2024-08-28 RX ADMIN — Medication 204 MILLIGRAM(S): at 15:46

## 2024-08-28 RX ADMIN — PALONOSETRON HYDROCHLORIDE 0.25 MILLIGRAM(S): 0.25 INJECTION INTRAVENOUS at 15:24

## 2024-08-28 RX ADMIN — LEUCOVORIN CALCIUM 750 MILLIGRAM(S): 100 INJECTION, POWDER, LYOPHILIZED, FOR SOLUTION INTRAMUSCULAR; INTRAVENOUS at 17:05

## 2024-08-28 RX ADMIN — Medication 90 MILLIGRAM(S): at 17:06

## 2024-08-28 RX ADMIN — Medication 10 MILLIGRAM(S): at 15:58

## 2024-08-28 RX ADMIN — Medication 10 MILLIGRAM(S): at 15:24

## 2024-08-28 RX ADMIN — BEVACIZUMAB-ADCD 380 MILLIGRAM(S): 400 INJECTION, SOLUTION INTRAVENOUS at 16:00

## 2024-08-28 RX ADMIN — LEUCOVORIN CALCIUM 750 MILLIGRAM(S): 100 INJECTION, POWDER, LYOPHILIZED, FOR SOLUTION INTRAMUSCULAR; INTRAVENOUS at 16:36

## 2024-08-28 RX ADMIN — BEVACIZUMAB-ADCD 380 MILLIGRAM(S): 400 INJECTION, SOLUTION INTRAVENOUS at 16:35

## 2024-08-28 NOTE — REVIEW OF SYSTEMS
[Fever] : no fever [Chills] : no chills [Night Sweats] : no night sweats [Fatigue] : no fatigue [Vision Problems] : no vision problems [Odynophagia] : no odynophagia [Mucosal Pain] : no mucosal pain [Chest Pain] : no chest pain [Palpitations] : no palpitations [Shortness Of Breath] : no shortness of breath [Wheezing] : no wheezing [Abdominal Pain] : no abdominal pain [Vomiting] : no vomiting [Constipation] : no constipation [Dysuria] : no dysuria [Joint Pain] : no joint pain [Muscle Pain] : no muscle pain [Skin Rash] : no skin rash [Dizziness] : no dizziness [Fainting] : no fainting [Anxiety] : no anxiety [Depression] : no depression [Muscle Weakness] : no muscle weakness [Easy Bleeding] : no tendency for easy bleeding [Easy Bruising] : no tendency for easy bruising [Swollen Glands] : no swollen glands [FreeTextEntry8] : No hematuria

## 2024-08-28 NOTE — END OF VISIT
[] : Fellow [FreeTextEntry3] :  I evaluated the patient with the Oncology fellow, Dr Martin.  Romy presents for continued chemotherapy treatment with 5-FU and bevacizumab.  No treatment-limiting toxicity.  she says that she is feeling well.  Labs reviewed and are OK for treatment.  Plan of care as above.

## 2024-08-28 NOTE — ASSESSMENT
[FreeTextEntry1] : Romy Oquendo is a 59 year old female BRCA-2 pathogenic mutation carrier with a history of sigmoid colon cancer originally resected in 7/2018.  At that time she had stage III disease.  Received adjuvant chemotherapy (presumably with Folfox although unconfirmed) but unfortunately she was found to have hepatic metastatic disease in 12/2021.  She then underwent chemotherapy with Folfiri + cetuximab for several cycles, followed by liver resection in 10/2022.  Pathology confirmed metastatic colon cancer, MMR intact.  She received some chemotherapy after surgery (? what type?).  In 10/2023 was found to have recurrent liver metastasis + a L ovarian mass.  The L ovary and Fallopian tube was resected in 10/2023 and was pathologically a new tumor, endometrial adenocarcinoma arising in a focus of endometriosis.  Complete surgical staging w/ hysterectomy + R salpingo-oophorectomy did not reveal any additional disease, rendering this a stage IA endometrial cancer not in need of any further therapy.  The liver metastasis was resected in 11/2023.  She did not receive any post-operative chemotherapy.  PET scan from 2/27/24 shows widely metastatic cancer - lung mets, liver mets, adenopathy above and below the diaphragm, and abdominal wall/peritoneal nodules.   Transitioned care from her previous oncologist 4/2024 due to insurance change.  Started Folfox + bevacizumab on 4/16//24.  Due to hypersensitivity reaction to Oxaliplatin, this drug was discontinued after cycle 7 and continue 5 FU + bevacizumab   Plan: # metastatic colon cancer --reviewed history with the patient at the first visit, limited records available and she was able to supplement some information.  --started Folfox + bevacizumab on 4/16/24.  repeated PET-CT 07/17/24 with improvement/stability of metastatic lesions and lymph nodes - no disease progression --developed rigors during cycle 7 of chemo infusion, wasn't clear that this was a hypersensitivity reaction so re-challenged with Increased pre-meds:  dex 20 mg IV, benadryl 50 mg IV, pepcid 20 mg IV, fosaprepitant, aloxi, However, pt had similar reaction in infusion room during cycle 8- vomiting and diaphoresis after small amt oxaliplatin infused. Now convinced that she was manifesting hypersensitivity to this drug. Oxaliplatin discontinued and she tolerated cycle 8 of 5FU+bevacizumab.  --plan is to continue  5FU+bevacizumab. As CEA is down trending w/o Oxaliplatin, we would continue  5FU+bevacizumab at this time and monitor CEA. If CEA is rising or any worsening symptoms, we will repeat imaging and consider switching to folfiri+ bevacizumab. --otherwise - she will be due for repeat scans mid October - PET-CT if approved by insurance. --CBC/CMP reviewed, thrombocytopenia improving. Checking UA today to monitor for proteinuria (on Avastin). --cleared for cycle 10 of 5-FU + bevacizumab today.  --trending CEA q4 weeks, due next visit.  # cancer related pain --Pt prefers tylenol #3, but limited pharmacy availability. Declines oxycodone due to prior intolerance. Tramadol has caused unwanted nausea and drowsiness. --follows with Palliative care --well controlled on cannabis gummies and Vicodin daily PRN  # BRCA-2 pathogenic mutation carrier --identified on tumor NGS from 2022.  unknown if she ever had germline genetic testing. parp inhibitor in future if fails standard therapies?  # chemo induced N/V --Zofran has been ineffective.  well controlled on cannabis gummies and Compazine PRN  # chemo induced neuropathy (stable) --well controlled on gabapentin BID  # chemo induced thrombocytopenia --Improving. No bleeding/bruising after Avastin. --ok for chemo, monitor closely   Tx only visit with labs in 2 weeks  RTC in 4 weeks for office visit with tx (CBC, CMP, CEA)  Please see attending physician attestation below.

## 2024-08-28 NOTE — PHYSICAL EXAM
[Restricted in physically strenuous activity but ambulatory and able to carry out work of a light or sedentary nature] : Status 1- Restricted in physically strenuous activity but ambulatory and able to carry out work of a light or sedentary nature, e.g., light house work, office work [Normal] : normal spine exam without palpable tenderness, no kyphosis or scoliosis [de-identified] : EOMI, no conjunctival injection, anicteric [de-identified] : No mucositis, no gum bleeding [de-identified] : supple, no cervical or supraclavicular LAD [de-identified] : right chest wall chemo port in place,  no signs of infection [de-identified] : Skin exam deferred [de-identified] : Small singular 1mm circular erythematous macular lesion on right forearm, without warmth, fluctuance, or induration

## 2024-08-28 NOTE — PHYSICAL EXAM
[Restricted in physically strenuous activity but ambulatory and able to carry out work of a light or sedentary nature] : Status 1- Restricted in physically strenuous activity but ambulatory and able to carry out work of a light or sedentary nature, e.g., light house work, office work [Normal] : normal spine exam without palpable tenderness, no kyphosis or scoliosis [de-identified] : EOMI, no conjunctival injection, anicteric [de-identified] : No mucositis, no gum bleeding [de-identified] : supple, no cervical or supraclavicular LAD [de-identified] : right chest wall chemo port in place,  no signs of infection [de-identified] : Skin exam deferred [de-identified] : Small singular 1mm circular erythematous macular lesion on right forearm, without warmth, fluctuance, or induration

## 2024-08-28 NOTE — HISTORY OF PRESENT ILLNESS
[de-identified] : Romy Oquendo is a 59 year old female here for f/u of metastatic colon cancer.   Oncologic history: 1. metastatic colon cancer --original diagnosis in 2018.  s/p sigmoid colon resection for a [id number] cancer on 7/11/2018 at [facility] with Dr Jimenez. --received adjuvant chemo (? folfox?) --liver metastases (x 2) identified in 12/2021.  went on chemo with Folfiri + cetuximab with Dr Oziel Paulson.  Partial response by PET.  Partial liver resection 10/6/22.  two lesions, both adenocarcinoma consistent with colonic primary.  MMR intact.  margins negative.  characterized by pathology as "minimal/no treatment response."   --NGS on the liver metastases from 2022:  BRCA-2 frameshift mutation (pathogenic);  PIK3CA [id number];  p53.  DEJUAN, TMB low. --received more chemo "adjuvant" unclear exactly what  --10/2023:  found to have recurrent cancer in the liver + possible peritoneal recurrence + ovarian mass --s/p diagnostic laparoscopy ([facility]) + L oophorectomy ([facility]).  No peritoneal carcinomatosis.  Ovary with endometrioid adenocarcinoma, moderately differentiated, arising from a background of endometriosis in the L ovary/fallopian tube.  1.8 cm tumor.   --followed by robotic hysterectomy + R salpingo-oophorectomy, and extended liver resection on 11/9/23.  Pathology from the liver consistent with metastatic colon cancer.  Pathology from the GYN specimens benign --PET scan 2/27/24:  Extensive metastatic malignancy.   multiple new hypermetabolic low attenuation lesions in the liver, c/w new liver metastases, New hypermetabolic mesenteric lymph nodes inferior to the liver, New hypermetabolic subcutaneous, intramuscular, intraperitoneal nodules associated with the anterior abdominal wall, Multiple new b/l pulm nodules, the larger ones are hypermetabolic on pet, New hypermetabolic L hilar lymphadenopathy, Increase in size and intensity of R cardiophrenic lymphadenopathy --foundation one NGS on liver metastasis resected in 11/2023:  DEJUAN.  TMB = 1 mut/mB.  KAYLA, BRAF WT.  mutations detected in APC, BRCA-2, PTEN, BTG1, CTCF, TP53 --started Folfox + bevacizumab on 4/16/24 -- PET/CT on 7/17/2024 with improvement/stability of metastatic lesions and lymph nodes - no disease progression --pt with severe hypersensitivity reaction to oxaliplatin - despite pre-medication - in August 2024 reduced intensity of treatment to 5-FU and bevacizumab alone.  2. endometrial adenocarcinoma - stage IA- diagnosed 10/2023 - as above.  no adjuvant therapy  3.  BRCA-2 pathogenic mutation --s/p hysterectomy + BSO.  PMH, PSH reviewed in the chart [facility] - mother with ovarian cancer, MDS. SH- lives in [place].  Single. no children.  reports good support from family, friends.  nonsmoker, denies regular ETOH [de-identified] : Presents for cycle 10 of 5-FU + bevacizumab.   Reports feeling well since last visit. States her generalized cancer pain, nausea, peripheral neuropathy are all well controlled on current medications. Good appetite. Having daily bowel movements, normal consistency. Denies abdominal pain, bleeding/bruising, oral ulcers, vision changes, rashes, hematuria, melena/hematochezia.

## 2024-08-28 NOTE — HISTORY OF PRESENT ILLNESS
[de-identified] : Romy Oquendo is a 59 year old female here for f/u of metastatic colon cancer.   Oncologic history: 1. metastatic colon cancer --original diagnosis in 2018.  s/p sigmoid colon resection for a [id number] cancer on 7/11/2018 at [facility] with Dr Jimenez. --received adjuvant chemo (? folfox?) --liver metastases (x 2) identified in 12/2021.  went on chemo with Folfiri + cetuximab with Dr Oziel Paulson.  Partial response by PET.  Partial liver resection 10/6/22.  two lesions, both adenocarcinoma consistent with colonic primary.  MMR intact.  margins negative.  characterized by pathology as "minimal/no treatment response."   --NGS on the liver metastases from 2022:  BRCA-2 frameshift mutation (pathogenic);  PIK3CA [id number];  p53.  DEJUAN, TMB low. --received more chemo "adjuvant" unclear exactly what  --10/2023:  found to have recurrent cancer in the liver + possible peritoneal recurrence + ovarian mass --s/p diagnostic laparoscopy ([facility]) + L oophorectomy ([facility]).  No peritoneal carcinomatosis.  Ovary with endometrioid adenocarcinoma, moderately differentiated, arising from a background of endometriosis in the L ovary/fallopian tube.  1.8 cm tumor.   --followed by robotic hysterectomy + R salpingo-oophorectomy, and extended liver resection on 11/9/23.  Pathology from the liver consistent with metastatic colon cancer.  Pathology from the GYN specimens benign --PET scan 2/27/24:  Extensive metastatic malignancy.   multiple new hypermetabolic low attenuation lesions in the liver, c/w new liver metastases, New hypermetabolic mesenteric lymph nodes inferior to the liver, New hypermetabolic subcutaneous, intramuscular, intraperitoneal nodules associated with the anterior abdominal wall, Multiple new b/l pulm nodules, the larger ones are hypermetabolic on pet, New hypermetabolic L hilar lymphadenopathy, Increase in size and intensity of R cardiophrenic lymphadenopathy --foundation one NGS on liver metastasis resected in 11/2023:  DEJUAN.  TMB = 1 mut/mB.  KAYLA, BRAF WT.  mutations detected in APC, BRCA-2, PTEN, BTG1, CTCF, TP53 --started Folfox + bevacizumab on 4/16/24 -- PET/CT on 7/17/2024 with improvement/stability of metastatic lesions and lymph nodes - no disease progression --pt with severe hypersensitivity reaction to oxaliplatin - despite pre-medication - in August 2024 reduced intensity of treatment to 5-FU and bevacizumab alone.  2. endometrial adenocarcinoma - stage IA- diagnosed 10/2023 - as above.  no adjuvant therapy  3.  BRCA-2 pathogenic mutation --s/p hysterectomy + BSO.  PMH, PSH reviewed in the chart [facility] - mother with ovarian cancer, MDS. SH- lives in [place].  Single. no children.  reports good support from family, friends.  nonsmoker, denies regular ETOH [de-identified] : Presents for cycle 10 of 5-FU + bevacizumab.   Reports feeling well since last visit. States her generalized cancer pain, nausea, peripheral neuropathy are all well controlled on current medications. Good appetite. Having daily bowel movements, normal consistency. Denies abdominal pain, bleeding/bruising, oral ulcers, vision changes, rashes, hematuria, melena/hematochezia.

## 2024-08-30 ENCOUNTER — OUTPATIENT (OUTPATIENT)
Dept: OUTPATIENT SERVICES | Facility: HOSPITAL | Age: 59
LOS: 1 days | End: 2024-08-30
Payer: COMMERCIAL

## 2024-08-30 ENCOUNTER — APPOINTMENT (OUTPATIENT)
Dept: INFUSION THERAPY | Facility: CLINIC | Age: 59
End: 2024-08-30

## 2024-08-30 VITALS
DIASTOLIC BLOOD PRESSURE: 82 MMHG | WEIGHT: 166.89 LBS | HEART RATE: 100 BPM | HEIGHT: 67 IN | SYSTOLIC BLOOD PRESSURE: 135 MMHG | TEMPERATURE: 98 F | RESPIRATION RATE: 18 BRPM | OXYGEN SATURATION: 98 %

## 2024-08-30 DIAGNOSIS — Z90.89 ACQUIRED ABSENCE OF OTHER ORGANS: Chronic | ICD-10-CM

## 2024-08-30 DIAGNOSIS — Z98.890 OTHER SPECIFIED POSTPROCEDURAL STATES: Chronic | ICD-10-CM

## 2024-08-30 DIAGNOSIS — Z90.49 ACQUIRED ABSENCE OF OTHER SPECIFIED PARTS OF DIGESTIVE TRACT: Chronic | ICD-10-CM

## 2024-08-30 DIAGNOSIS — Z90.79 ACQUIRED ABSENCE OF OTHER GENITAL ORGAN(S): Chronic | ICD-10-CM

## 2024-08-30 PROCEDURE — 96523 IRRIG DRUG DELIVERY DEVICE: CPT

## 2024-08-30 PROCEDURE — 96360 HYDRATION IV INFUSION INIT: CPT

## 2024-08-30 RX ORDER — SODIUM CHLORIDE 9 MG/ML
10 INJECTION INTRAMUSCULAR; INTRAVENOUS; SUBCUTANEOUS ONCE
Refills: 0 | Status: COMPLETED | OUTPATIENT
Start: 2024-08-30 | End: 2024-08-30

## 2024-08-30 RX ADMIN — SODIUM CHLORIDE 10 MILLILITER(S): 9 INJECTION INTRAMUSCULAR; INTRAVENOUS; SUBCUTANEOUS at 15:20

## 2024-09-04 DIAGNOSIS — C18.9 MALIGNANT NEOPLASM OF COLON, UNSPECIFIED: ICD-10-CM

## 2024-09-11 ENCOUNTER — APPOINTMENT (OUTPATIENT)
Dept: INFUSION THERAPY | Facility: CLINIC | Age: 59
End: 2024-09-11

## 2024-09-11 ENCOUNTER — OUTPATIENT (OUTPATIENT)
Dept: OUTPATIENT SERVICES | Facility: HOSPITAL | Age: 59
LOS: 1 days | End: 2024-09-11
Payer: COMMERCIAL

## 2024-09-11 VITALS
HEIGHT: 67 IN | RESPIRATION RATE: 18 BRPM | WEIGHT: 166.89 LBS | TEMPERATURE: 98 F | SYSTOLIC BLOOD PRESSURE: 139 MMHG | DIASTOLIC BLOOD PRESSURE: 85 MMHG | OXYGEN SATURATION: 98 % | HEART RATE: 100 BPM

## 2024-09-11 VITALS
TEMPERATURE: 98 F | OXYGEN SATURATION: 99 % | SYSTOLIC BLOOD PRESSURE: 128 MMHG | RESPIRATION RATE: 18 BRPM | HEART RATE: 99 BPM | DIASTOLIC BLOOD PRESSURE: 78 MMHG

## 2024-09-11 DIAGNOSIS — Z98.890 OTHER SPECIFIED POSTPROCEDURAL STATES: Chronic | ICD-10-CM

## 2024-09-11 DIAGNOSIS — C18.9 MALIGNANT NEOPLASM OF COLON, UNSPECIFIED: ICD-10-CM

## 2024-09-11 DIAGNOSIS — Z90.89 ACQUIRED ABSENCE OF OTHER ORGANS: Chronic | ICD-10-CM

## 2024-09-11 DIAGNOSIS — Z90.79 ACQUIRED ABSENCE OF OTHER GENITAL ORGAN(S): Chronic | ICD-10-CM

## 2024-09-11 DIAGNOSIS — Z90.49 ACQUIRED ABSENCE OF OTHER SPECIFIED PARTS OF DIGESTIVE TRACT: Chronic | ICD-10-CM

## 2024-09-11 LAB
ALBUMIN SERPL ELPH-MCNC: 4 G/DL — SIGNIFICANT CHANGE UP (ref 3.3–5)
ALP SERPL-CCNC: 134 U/L — HIGH (ref 40–120)
ALT FLD-CCNC: 37 U/L — SIGNIFICANT CHANGE UP (ref 10–45)
ANION GAP SERPL CALC-SCNC: 2 MMOL/L — LOW (ref 5–17)
AST SERPL-CCNC: 42 U/L — HIGH (ref 10–40)
BILIRUB SERPL-MCNC: 1.1 MG/DL — SIGNIFICANT CHANGE UP (ref 0.2–1.2)
BUN SERPL-MCNC: 14 MG/DL — SIGNIFICANT CHANGE UP (ref 7–23)
CALCIUM SERPL-MCNC: 10.4 MG/DL — SIGNIFICANT CHANGE UP (ref 8.4–10.5)
CHLORIDE SERPL-SCNC: 111 MMOL/L — HIGH (ref 96–108)
CO2 SERPL-SCNC: 26 MMOL/L — SIGNIFICANT CHANGE UP (ref 22–31)
CREAT SERPL-MCNC: 0.8 MG/DL — SIGNIFICANT CHANGE UP (ref 0.5–1.3)
EGFR: 86 ML/MIN/1.73M2 — SIGNIFICANT CHANGE UP
GLUCOSE SERPL-MCNC: 122 MG/DL — HIGH (ref 70–99)
HCT VFR BLD CALC: 41.6 % — SIGNIFICANT CHANGE UP (ref 34.5–45)
HGB BLD-MCNC: 13.9 G/DL — SIGNIFICANT CHANGE UP (ref 11.5–15.5)
LYMPHOCYTES # BLD AUTO: 1.5 K/UL — SIGNIFICANT CHANGE UP (ref 1–3.3)
LYMPHOCYTES # BLD AUTO: 36 % — SIGNIFICANT CHANGE UP (ref 13–44)
MCHC RBC-ENTMCNC: 32.5 PG — SIGNIFICANT CHANGE UP (ref 27–34)
MCHC RBC-ENTMCNC: 33.4 GM/DL — SIGNIFICANT CHANGE UP (ref 32–36)
MCV RBC AUTO: 97.2 FL — SIGNIFICANT CHANGE UP (ref 80–100)
NEUTROPHILS # BLD AUTO: 2.1 K/UL — SIGNIFICANT CHANGE UP (ref 1.8–7.4)
NEUTROPHILS NFR BLD AUTO: 51.9 % — SIGNIFICANT CHANGE UP (ref 43–77)
PLATELET # BLD AUTO: 108 K/UL — LOW (ref 150–400)
POTASSIUM SERPL-MCNC: 4.5 MMOL/L — SIGNIFICANT CHANGE UP (ref 3.5–5.3)
POTASSIUM SERPL-SCNC: 4.5 MMOL/L — SIGNIFICANT CHANGE UP (ref 3.5–5.3)
PROT SERPL-MCNC: 7.3 G/DL — SIGNIFICANT CHANGE UP (ref 6–8.3)
RBC # BLD: 4.28 M/UL — SIGNIFICANT CHANGE UP (ref 3.8–5.2)
RBC # FLD: 16.5 % — HIGH (ref 10.3–14.5)
SODIUM SERPL-SCNC: 139 MMOL/L — SIGNIFICANT CHANGE UP (ref 135–145)
WBC # BLD: 4.1 K/UL — SIGNIFICANT CHANGE UP (ref 3.8–10.5)
WBC # FLD AUTO: 4.1 K/UL — SIGNIFICANT CHANGE UP (ref 3.8–10.5)

## 2024-09-11 PROCEDURE — 96413 CHEMO IV INFUSION 1 HR: CPT

## 2024-09-11 PROCEDURE — 80053 COMPREHEN METABOLIC PANEL: CPT

## 2024-09-11 PROCEDURE — 96375 TX/PRO/DX INJ NEW DRUG ADDON: CPT

## 2024-09-11 PROCEDURE — 85025 COMPLETE CBC W/AUTO DIFF WBC: CPT

## 2024-09-11 PROCEDURE — 96416 CHEMO PROLONG INFUSE W/PUMP: CPT

## 2024-09-11 PROCEDURE — 36415 COLL VENOUS BLD VENIPUNCTURE: CPT

## 2024-09-11 PROCEDURE — 96411 CHEMO IV PUSH ADDL DRUG: CPT

## 2024-09-11 RX ORDER — LIDOCAINE/BENZALKONIUM/ALCOHOL
1 SOLUTION, NON-ORAL TOPICAL ONCE
Refills: 0 | Status: COMPLETED | OUTPATIENT
Start: 2024-09-11 | End: 2024-09-11

## 2024-09-11 RX ORDER — BEVACIZUMAB-ADCD 400 MG/16ML
380 INJECTION, SOLUTION INTRAVENOUS ONCE
Refills: 0 | Status: COMPLETED | OUTPATIENT
Start: 2024-09-11 | End: 2024-09-11

## 2024-09-11 RX ORDER — DEXAMETHASONE 0.75 MG
10 TABLET ORAL ONCE
Refills: 0 | Status: COMPLETED | OUTPATIENT
Start: 2024-09-11 | End: 2024-09-11

## 2024-09-11 RX ORDER — FLUOROURACIL 50 MG/ML
750 VIAL (ML) INTRAVENOUS ONCE
Refills: 0 | Status: COMPLETED | OUTPATIENT
Start: 2024-09-11 | End: 2024-09-11

## 2024-09-11 RX ORDER — METOCLOPRAMIDE HCL 5 MG
10 TABLET ORAL ONCE
Refills: 0 | Status: COMPLETED | OUTPATIENT
Start: 2024-09-11 | End: 2024-09-11

## 2024-09-11 RX ORDER — FLUOROURACIL 50 MG/ML
4530 VIAL (ML) INTRAVENOUS ONCE
Refills: 0 | Status: COMPLETED | OUTPATIENT
Start: 2024-09-11 | End: 2024-09-11

## 2024-09-11 RX ORDER — PALONOSETRON HYDROCHLORIDE 0.25 MG/5ML
0.25 INJECTION INTRAVENOUS ONCE
Refills: 0 | Status: COMPLETED | OUTPATIENT
Start: 2024-09-11 | End: 2024-09-11

## 2024-09-11 RX ORDER — LEUCOVORIN CALCIUM 100 MG/10ML
750 INJECTION, POWDER, LYOPHILIZED, FOR SOLUTION INTRAMUSCULAR; INTRAVENOUS ONCE
Refills: 0 | Status: COMPLETED | OUTPATIENT
Start: 2024-09-11 | End: 2024-09-11

## 2024-09-11 RX ADMIN — Medication 10 MILLIGRAM(S): at 10:35

## 2024-09-11 RX ADMIN — PALONOSETRON HYDROCHLORIDE 0.25 MILLIGRAM(S): 0.25 INJECTION INTRAVENOUS at 10:38

## 2024-09-11 RX ADMIN — LEUCOVORIN CALCIUM 750 MILLIGRAM(S): 100 INJECTION, POWDER, LYOPHILIZED, FOR SOLUTION INTRAMUSCULAR; INTRAVENOUS at 12:00

## 2024-09-11 RX ADMIN — BEVACIZUMAB-ADCD 380 MILLIGRAM(S): 400 INJECTION, SOLUTION INTRAVENOUS at 11:11

## 2024-09-11 RX ADMIN — BEVACIZUMAB-ADCD 380 MILLIGRAM(S): 400 INJECTION, SOLUTION INTRAVENOUS at 10:41

## 2024-09-11 RX ADMIN — Medication 10 MILLIGRAM(S): at 11:27

## 2024-09-11 RX ADMIN — LEUCOVORIN CALCIUM 750 MILLIGRAM(S): 100 INJECTION, POWDER, LYOPHILIZED, FOR SOLUTION INTRAMUSCULAR; INTRAVENOUS at 11:30

## 2024-09-11 RX ADMIN — Medication 204 MILLIGRAM(S): at 11:12

## 2024-09-11 RX ADMIN — Medication 90 MILLIGRAM(S): at 12:05

## 2024-09-11 RX ADMIN — Medication 4530 MILLIGRAM(S): at 12:15

## 2024-09-13 ENCOUNTER — APPOINTMENT (OUTPATIENT)
Dept: INFUSION THERAPY | Facility: CLINIC | Age: 59
End: 2024-09-13

## 2024-09-13 ENCOUNTER — OUTPATIENT (OUTPATIENT)
Dept: OUTPATIENT SERVICES | Facility: HOSPITAL | Age: 59
LOS: 1 days | End: 2024-09-13
Payer: COMMERCIAL

## 2024-09-13 VITALS
RESPIRATION RATE: 17 BRPM | SYSTOLIC BLOOD PRESSURE: 126 MMHG | DIASTOLIC BLOOD PRESSURE: 80 MMHG | HEART RATE: 88 BPM | TEMPERATURE: 98 F | OXYGEN SATURATION: 98 %

## 2024-09-13 DIAGNOSIS — Z90.89 ACQUIRED ABSENCE OF OTHER ORGANS: Chronic | ICD-10-CM

## 2024-09-13 DIAGNOSIS — Z90.79 ACQUIRED ABSENCE OF OTHER GENITAL ORGAN(S): Chronic | ICD-10-CM

## 2024-09-13 DIAGNOSIS — C18.9 MALIGNANT NEOPLASM OF COLON, UNSPECIFIED: ICD-10-CM

## 2024-09-13 DIAGNOSIS — Z98.890 OTHER SPECIFIED POSTPROCEDURAL STATES: Chronic | ICD-10-CM

## 2024-09-13 DIAGNOSIS — Z90.49 ACQUIRED ABSENCE OF OTHER SPECIFIED PARTS OF DIGESTIVE TRACT: Chronic | ICD-10-CM

## 2024-09-13 PROCEDURE — 96523 IRRIG DRUG DELIVERY DEVICE: CPT

## 2024-09-13 PROCEDURE — 96416 CHEMO PROLONG INFUSE W/PUMP: CPT

## 2024-09-13 RX ORDER — SODIUM CHLORIDE 9 MG/ML
10 INJECTION INTRAMUSCULAR; INTRAVENOUS; SUBCUTANEOUS ONCE
Refills: 0 | Status: COMPLETED | OUTPATIENT
Start: 2024-09-13 | End: 2024-09-13

## 2024-09-13 RX ADMIN — Medication 4530 MILLIGRAM(S): at 10:55

## 2024-09-13 RX ADMIN — SODIUM CHLORIDE 10 MILLILITER(S): 9 INJECTION INTRAMUSCULAR; INTRAVENOUS; SUBCUTANEOUS at 10:56

## 2024-09-25 ENCOUNTER — APPOINTMENT (OUTPATIENT)
Dept: HEMATOLOGY ONCOLOGY | Facility: CLINIC | Age: 59
End: 2024-09-25
Payer: MEDICAID

## 2024-09-25 ENCOUNTER — APPOINTMENT (OUTPATIENT)
Dept: INFUSION THERAPY | Facility: CLINIC | Age: 59
End: 2024-09-25

## 2024-09-25 ENCOUNTER — OUTPATIENT (OUTPATIENT)
Dept: OUTPATIENT SERVICES | Facility: HOSPITAL | Age: 59
LOS: 1 days | End: 2024-09-25
Payer: COMMERCIAL

## 2024-09-25 VITALS
DIASTOLIC BLOOD PRESSURE: 84 MMHG | SYSTOLIC BLOOD PRESSURE: 124 MMHG | RESPIRATION RATE: 20 BRPM | TEMPERATURE: 98 F | HEART RATE: 80 BPM | WEIGHT: 171.08 LBS | OXYGEN SATURATION: 97 % | HEIGHT: 67 IN

## 2024-09-25 VITALS
DIASTOLIC BLOOD PRESSURE: 84 MMHG | SYSTOLIC BLOOD PRESSURE: 124 MMHG | WEIGHT: 171 LBS | RESPIRATION RATE: 18 BRPM | HEIGHT: 67 IN | TEMPERATURE: 98 F | HEART RATE: 80 BPM | OXYGEN SATURATION: 97 % | BODY MASS INDEX: 26.84 KG/M2

## 2024-09-25 VITALS
DIASTOLIC BLOOD PRESSURE: 78 MMHG | TEMPERATURE: 98 F | OXYGEN SATURATION: 98 % | RESPIRATION RATE: 20 BRPM | HEART RATE: 85 BPM | SYSTOLIC BLOOD PRESSURE: 118 MMHG

## 2024-09-25 DIAGNOSIS — T45.1X5A OTHER SECONDARY THROMBOCYTOPENIA: ICD-10-CM

## 2024-09-25 DIAGNOSIS — D69.59 OTHER SECONDARY THROMBOCYTOPENIA: ICD-10-CM

## 2024-09-25 DIAGNOSIS — Z90.49 ACQUIRED ABSENCE OF OTHER SPECIFIED PARTS OF DIGESTIVE TRACT: Chronic | ICD-10-CM

## 2024-09-25 DIAGNOSIS — C18.9 MALIGNANT NEOPLASM OF COLON, UNSPECIFIED: ICD-10-CM

## 2024-09-25 DIAGNOSIS — G89.3 NEOPLASM RELATED PAIN (ACUTE) (CHRONIC): ICD-10-CM

## 2024-09-25 DIAGNOSIS — C78.00 SECONDARY MALIGNANT NEOPLASM OF UNSPECIFIED LUNG: ICD-10-CM

## 2024-09-25 DIAGNOSIS — Z90.89 ACQUIRED ABSENCE OF OTHER ORGANS: Chronic | ICD-10-CM

## 2024-09-25 DIAGNOSIS — Z98.890 OTHER SPECIFIED POSTPROCEDURAL STATES: Chronic | ICD-10-CM

## 2024-09-25 DIAGNOSIS — C78.7 MALIGNANT NEOPLASM OF COLON, UNSPECIFIED: ICD-10-CM

## 2024-09-25 DIAGNOSIS — C78.6 SECONDARY MALIGNANT NEOPLASM OF RETROPERITONEUM AND PERITONEUM: ICD-10-CM

## 2024-09-25 LAB
ALBUMIN SERPL ELPH-MCNC: 3.7 G/DL
ALP BLD-CCNC: 140 U/L
ALT SERPL-CCNC: 44 U/L
ANION GAP SERPL CALC-SCNC: 4 MMOL/L
AST SERPL-CCNC: 43 U/L
BILIRUB SERPL-MCNC: 1.4 MG/DL
BUN SERPL-MCNC: 14 MG/DL
CALCIUM SERPL-MCNC: 9.8 MG/DL
CHLORIDE SERPL-SCNC: 110 MMOL/L
CO2 SERPL-SCNC: 26 MMOL/L
CREAT SERPL-MCNC: 1 MG/DL
EGFR: 66 ML/MIN/1.73M2
GLUCOSE SERPL-MCNC: 116 MG/DL
HCT VFR BLD CALC: 42 %
HGB BLD-MCNC: 13.9 G/DL
LYMPHOCYTES # BLD AUTO: 2 K/UL
LYMPHOCYTES NFR BLD AUTO: 41.1 %
MAN DIFF?: NO
MCHC RBC-ENTMCNC: 32.5 PG
MCHC RBC-ENTMCNC: 33.1 GM/DL
MCV RBC AUTO: 98.1 FL
NEUTROPHILS # BLD AUTO: 2.2 K/UL
NEUTROPHILS NFR BLD AUTO: 44.2 %
PLATELET # BLD AUTO: 134 K/UL
POTASSIUM SERPL-SCNC: 4.4 MMOL/L
PROT SERPL-MCNC: 7.2 G/DL
RBC # BLD: 4.28 M/UL
RBC # FLD: 17.4 %
SODIUM SERPL-SCNC: 140 MMOL/L
WBC # FLD AUTO: 4.9 K/UL

## 2024-09-25 PROCEDURE — 96413 CHEMO IV INFUSION 1 HR: CPT

## 2024-09-25 PROCEDURE — 96367 TX/PROPH/DG ADDL SEQ IV INF: CPT

## 2024-09-25 PROCEDURE — 96416 CHEMO PROLONG INFUSE W/PUMP: CPT

## 2024-09-25 PROCEDURE — 96411 CHEMO IV PUSH ADDL DRUG: CPT

## 2024-09-25 PROCEDURE — 96375 TX/PRO/DX INJ NEW DRUG ADDON: CPT

## 2024-09-25 PROCEDURE — 99215 OFFICE O/P EST HI 40 MIN: CPT | Mod: 25

## 2024-09-25 RX ORDER — FLUOROURACIL 50 MG/ML
4530 INJECTION, SOLUTION INTRAVENOUS ONCE
Refills: 0 | Status: COMPLETED | OUTPATIENT
Start: 2024-09-25 | End: 2024-09-25

## 2024-09-25 RX ORDER — BEVACIZUMAB-MALY 100 MG/4ML
380 INJECTION, SOLUTION INTRAVENOUS ONCE
Refills: 0 | Status: COMPLETED | OUTPATIENT
Start: 2024-09-25 | End: 2024-09-25

## 2024-09-25 RX ORDER — PALONOSETRON 0.25 MG/5ML
0.25 INJECTION, SOLUTION INTRAVENOUS ONCE
Refills: 0 | Status: COMPLETED | OUTPATIENT
Start: 2024-09-25 | End: 2024-09-25

## 2024-09-25 RX ORDER — LEUCOVORIN CALCIUM 25 MG
750 TABLET ORAL ONCE
Refills: 0 | Status: COMPLETED | OUTPATIENT
Start: 2024-09-25 | End: 2024-09-25

## 2024-09-25 RX ORDER — LIDOCAINE 50 MG/G
1 CREAM TOPICAL ONCE
Refills: 0 | Status: COMPLETED | OUTPATIENT
Start: 2024-09-25 | End: 2024-09-25

## 2024-09-25 RX ORDER — METOCLOPRAMIDE HCL 5 MG
10 TABLET ORAL ONCE
Refills: 0 | Status: COMPLETED | OUTPATIENT
Start: 2024-09-25 | End: 2024-09-25

## 2024-09-25 RX ORDER — FLUOROURACIL 50 MG/ML
750 INJECTION, SOLUTION INTRAVENOUS ONCE
Refills: 0 | Status: COMPLETED | OUTPATIENT
Start: 2024-09-25 | End: 2024-09-25

## 2024-09-25 RX ADMIN — FLUOROURACIL 90 MILLIGRAM(S): 50 INJECTION, SOLUTION INTRAVENOUS at 11:10

## 2024-09-25 RX ADMIN — Medication 204 MILLIGRAM(S): at 09:21

## 2024-09-25 RX ADMIN — PALONOSETRON 0.25 MILLIGRAM(S): 0.25 INJECTION, SOLUTION INTRAVENOUS at 09:21

## 2024-09-25 RX ADMIN — BEVACIZUMAB-MALY 380 MILLIGRAM(S): 100 INJECTION, SOLUTION INTRAVENOUS at 09:54

## 2024-09-25 RX ADMIN — Medication 750 MILLIGRAM(S): at 10:30

## 2024-09-25 RX ADMIN — Medication 750 MILLIGRAM(S): at 11:00

## 2024-09-25 RX ADMIN — BEVACIZUMAB-MALY 380 MILLIGRAM(S): 100 INJECTION, SOLUTION INTRAVENOUS at 10:30

## 2024-09-25 RX ADMIN — Medication 10 MILLIGRAM(S): at 09:36

## 2024-09-25 RX ADMIN — FLUOROURACIL 4530 MILLIGRAM(S): 50 INJECTION, SOLUTION INTRAVENOUS at 11:25

## 2024-09-25 RX ADMIN — Medication 10 MILLIGRAM(S): at 09:21

## 2024-09-26 NOTE — ASSESSMENT
[FreeTextEntry1] : Romy Oquendo is a 59 year old female BRCA-2 pathogenic mutation carrier with a history of sigmoid colon cancer originally resected in 7/2018.  At that time she had stage III disease.  Received adjuvant chemotherapy (presumably with Folfox although unconfirmed) but unfortunately she was found to have hepatic metastatic disease in 12/2021.  She then underwent chemotherapy with Folfiri + cetuximab for several cycles, followed by liver resection in 10/2022.  Pathology confirmed metastatic colon cancer, MMR intact.  She received some chemotherapy after surgery (? what type?).  In 10/2023 was found to have recurrent liver metastasis + a L ovarian mass.  The L ovary and Fallopian tube was resected in 10/2023 and was pathologically a new tumor, endometrial adenocarcinoma arising in a focus of endometriosis.  Complete surgical staging w/ hysterectomy + R salpingo-oophorectomy did not reveal any additional disease, rendering this a stage IA endometrial cancer not in need of any further therapy.  The liver metastasis was resected in 11/2023.  She did not receive any post-operative chemotherapy.  PET scan from 2/27/24 shows widely metastatic cancer - lung mets, liver mets, adenopathy above and below the diaphragm, and abdominal wall/peritoneal nodules.   Transitioned care from her previous oncologist 4/2024 due to insurance change.  Started Folfox + bevacizumab on 4/16//24.  Due to hypersensitivity reaction to Oxaliplatin, this drug was discontinued after cycle 7 and continue 5 FU + bevacizumab   Plan: # metastatic colon cancer --reviewed history with the patient at the first visit, limited records available and she was able to provide an excellent history.    --started Folfox + bevacizumab on 4/16/24.  repeated PET-CT 07/17/24 with improvement/stability of metastatic lesions and lymph nodes - no disease progression --developed rigors during cycle 7 of chemo infusion, wasn't clear that this was a hypersensitivity reaction so re-challenged with Increased pre-meds:  dex 20 mg IV, benadryl 50 mg IV, pepcid 20 mg IV, fosaprepitant, aloxi, However, pt had similar reaction in infusion room during cycle 8- vomiting and diaphoresis after small amt oxaliplatin infused. Now convinced that she was manifesting hypersensitivity to this drug. Oxaliplatin discontinued and she tolerated cycle 8 of 5FU+bevacizumab.  --plan is to continue  5FU+bevacizumab. As CEA is down trending w/o Oxaliplatin, we would continue  5FU+bevacizumab at this time and monitor CEA. If CEA is rising or any worsening symptoms, we will repeat imaging and consider switching to folfiri+ bevacizumab. --next imaging in mid October - PET-CT if approved by insurance. --CBC/CMP reviewed, thrombocytopenia improving.  --follow UA periodically to monitor for proteinuria (on Avastin); last 8/28/24 --cleared for cycle 11 of 5-FU + bevacizumab today.  --obtain CEA today; trending q4 weeks  # cancer related pain --Pt prefers tylenol #3, but limited pharmacy availability. Declines oxycodone due to prior intolerance. Tramadol has caused unwanted nausea and drowsiness. --follows with Palliative care --well controlled on cannabis gummies and Vicodin daily PRN.  requests refill vicodin.  # BRCA-2 pathogenic mutation carrier --identified on tumor NGS from 2022. unknown if she ever had germline genetic testing. parp inhibitor in future if fails standard therapies?  # chemo induced N/V --Zofran has been ineffective. well controlled on cannabis gummies and Compazine PRN  # chemo induced neuropathy (stable) --well controlled on gabapentin BID - refilled.  # chemo induced thrombocytopenia --Improving. No bleeding/bruising after Avastin. --ok for chemo, monitor closely  Tx only visit with labs in 2 weeks. RTC in 4 weeks for office visit with tx (CBC, CMP, CEA) and to review scan results.  Please see attending physician attestation below.

## 2024-09-26 NOTE — PHYSICAL EXAM
[Restricted in physically strenuous activity but ambulatory and able to carry out work of a light or sedentary nature] : Status 1- Restricted in physically strenuous activity but ambulatory and able to carry out work of a light or sedentary nature, e.g., light house work, office work [de-identified] : Small singular 1mm circular erythematous macular lesion on right forearm, without warmth, fluctuance, or induration [Normal] : normal appearance, no rash, nodules, vesicles, ulcers, erythema [de-identified] : Skin exam deferred [de-identified] : EOMI, no conjunctival injection, anicteric [de-identified] : No mucositis, no gum bleeding [de-identified] : supple, no cervical or supraclavicular LAD [de-identified] : right chest wall chemo port in place, no signs of infection

## 2024-09-26 NOTE — PHYSICAL EXAM
[Restricted in physically strenuous activity but ambulatory and able to carry out work of a light or sedentary nature] : Status 1- Restricted in physically strenuous activity but ambulatory and able to carry out work of a light or sedentary nature, e.g., light house work, office work [de-identified] : Small singular 1mm circular erythematous macular lesion on right forearm, without warmth, fluctuance, or induration [Normal] : normal appearance, no rash, nodules, vesicles, ulcers, erythema [de-identified] : Skin exam deferred [de-identified] : EOMI, no conjunctival injection, anicteric [de-identified] : No mucositis, no gum bleeding [de-identified] : supple, no cervical or supraclavicular LAD [de-identified] : right chest wall chemo port in place, no signs of infection

## 2024-09-26 NOTE — HISTORY OF PRESENT ILLNESS
[de-identified] : Romy Oquendo is a 59 year old female here for f/u of metastatic colon cancer.   Oncologic history: 1. metastatic colon cancer --original diagnosis in 2018.  s/p sigmoid colon resection for a [id number] cancer on 7/11/2018 at [facility] with Dr Jimenez. --received adjuvant chemo (? folfox?) --liver metastases (x 2) identified in 12/2021.  went on chemo with Folfiri + cetuximab with Dr Oziel Paulson.  Partial response by PET.  Partial liver resection 10/6/22.  two lesions, both adenocarcinoma consistent with colonic primary.  MMR intact.  margins negative.  characterized by pathology as "minimal/no treatment response."   --NGS on the liver metastases from 2022:  BRCA-2 frameshift mutation (pathogenic);  PIK3CA [id number];  p53.  DEJUAN, TMB low. --received more chemo "adjuvant" unclear exactly what  --10/2023:  found to have recurrent cancer in the liver + possible peritoneal recurrence + ovarian mass --s/p diagnostic laparoscopy ([facility]) + L oophorectomy ([facility]).  No peritoneal carcinomatosis.  Ovary with endometrioid adenocarcinoma, moderately differentiated, arising from a background of endometriosis in the L ovary/fallopian tube.  1.8 cm tumor.   --followed by robotic hysterectomy + R salpingo-oophorectomy, and extended liver resection on 11/9/23.  Pathology from the liver consistent with metastatic colon cancer.  Pathology from the GYN specimens benign --PET scan 2/27/24:  Extensive metastatic malignancy.   multiple new hypermetabolic low attenuation lesions in the liver, c/w new liver metastases, New hypermetabolic mesenteric lymph nodes inferior to the liver, New hypermetabolic subcutaneous, intramuscular, intraperitoneal nodules associated with the anterior abdominal wall, Multiple new b/l pulm nodules, the larger ones are hypermetabolic on pet, New hypermetabolic L hilar lymphadenopathy, Increase in size and intensity of R cardiophrenic lymphadenopathy --foundation one NGS on liver metastasis resected in 11/2023:  DEJUAN.  TMB = 1 mut/mB.  KAYLA, BRAF WT.  mutations detected in APC, BRCA-2, PTEN, BTG1, CTCF, TP53 --started Folfox + bevacizumab on 4/16/24 -- PET/CT on 7/17/2024 with improvement/stability of metastatic lesions and lymph nodes - no disease progression --pt with severe hypersensitivity reaction to oxaliplatin - despite pre-medication - in August 2024 reduced intensity of treatment to 5-FU and bevacizumab alone.  2. endometrial adenocarcinoma - stage IA- diagnosed 10/2023 - as above.  no adjuvant therapy  3.  BRCA-2 pathogenic mutation --s/p hysterectomy + BSO.  PMH, PSH reviewed in the chart [facility] - mother with ovarian cancer, MDS. SH- lives in [place].  Single. no children.  reports good support from family, friends.  nonsmoker, denies regular ETOH [de-identified] : Here for cycle 11 of 5-FU + bevacizumab. Feels well since last visit. Notes some mild increased fatigue, but does not interfere with her IADLs. Still having hot flashes nightly. No epistaxis, bruising, or hematuria. Normal BMs. Good appetite, has put on weight. Pain well controlled with cannabis and daily Vicodin PRN.

## 2024-09-26 NOTE — REVIEW OF SYSTEMS
[Night Sweats] : no night sweats [Fatigue] : fatigue [Hot Flashes] : hot flashes [Fever] : no fever [Chills] : no chills [Vision Problems] : no vision problems [Odynophagia] : no odynophagia [Mucosal Pain] : no mucosal pain [Chest Pain] : no chest pain [Palpitations] : no palpitations [Shortness Of Breath] : no shortness of breath [Wheezing] : no wheezing [Abdominal Pain] : no abdominal pain [Vomiting] : no vomiting [Constipation] : no constipation [Dysuria] : no dysuria [Joint Pain] : no joint pain [Muscle Pain] : no muscle pain [Skin Rash] : no skin rash [Dizziness] : no dizziness [Fainting] : no fainting [Anxiety] : no anxiety [Depression] : no depression [Muscle Weakness] : no muscle weakness [Easy Bleeding] : no tendency for easy bleeding [Easy Bruising] : no tendency for easy bruising [Swollen Glands] : no swollen glands [FreeTextEntry8] : No hematuria

## 2024-09-26 NOTE — HISTORY OF PRESENT ILLNESS
[de-identified] : Romy Oquendo is a 59 year old female here for f/u of metastatic colon cancer.   Oncologic history: 1. metastatic colon cancer --original diagnosis in 2018.  s/p sigmoid colon resection for a [id number] cancer on 7/11/2018 at [facility] with Dr Jimenez. --received adjuvant chemo (? folfox?) --liver metastases (x 2) identified in 12/2021.  went on chemo with Folfiri + cetuximab with Dr Oziel Paulson.  Partial response by PET.  Partial liver resection 10/6/22.  two lesions, both adenocarcinoma consistent with colonic primary.  MMR intact.  margins negative.  characterized by pathology as "minimal/no treatment response."   --NGS on the liver metastases from 2022:  BRCA-2 frameshift mutation (pathogenic);  PIK3CA [id number];  p53.  DEJUAN, TMB low. --received more chemo "adjuvant" unclear exactly what  --10/2023:  found to have recurrent cancer in the liver + possible peritoneal recurrence + ovarian mass --s/p diagnostic laparoscopy ([facility]) + L oophorectomy ([facility]).  No peritoneal carcinomatosis.  Ovary with endometrioid adenocarcinoma, moderately differentiated, arising from a background of endometriosis in the L ovary/fallopian tube.  1.8 cm tumor.   --followed by robotic hysterectomy + R salpingo-oophorectomy, and extended liver resection on 11/9/23.  Pathology from the liver consistent with metastatic colon cancer.  Pathology from the GYN specimens benign --PET scan 2/27/24:  Extensive metastatic malignancy.   multiple new hypermetabolic low attenuation lesions in the liver, c/w new liver metastases, New hypermetabolic mesenteric lymph nodes inferior to the liver, New hypermetabolic subcutaneous, intramuscular, intraperitoneal nodules associated with the anterior abdominal wall, Multiple new b/l pulm nodules, the larger ones are hypermetabolic on pet, New hypermetabolic L hilar lymphadenopathy, Increase in size and intensity of R cardiophrenic lymphadenopathy --foundation one NGS on liver metastasis resected in 11/2023:  DEJUAN.  TMB = 1 mut/mB.  KAYLA, BRAF WT.  mutations detected in APC, BRCA-2, PTEN, BTG1, CTCF, TP53 --started Folfox + bevacizumab on 4/16/24 -- PET/CT on 7/17/2024 with improvement/stability of metastatic lesions and lymph nodes - no disease progression --pt with severe hypersensitivity reaction to oxaliplatin - despite pre-medication - in August 2024 reduced intensity of treatment to 5-FU and bevacizumab alone.  2. endometrial adenocarcinoma - stage IA- diagnosed 10/2023 - as above.  no adjuvant therapy  3.  BRCA-2 pathogenic mutation --s/p hysterectomy + BSO.  PMH, PSH reviewed in the chart [facility] - mother with ovarian cancer, MDS. SH- lives in [place].  Single. no children.  reports good support from family, friends.  nonsmoker, denies regular ETOH [de-identified] : Here for cycle 11 of 5-FU + bevacizumab. Feels well since last visit. Notes some mild increased fatigue, but does not interfere with her IADLs. Still having hot flashes nightly. No epistaxis, bruising, or hematuria. Normal BMs. Good appetite, has put on weight. Pain well controlled with cannabis and daily Vicodin PRN.

## 2024-09-26 NOTE — HISTORY OF PRESENT ILLNESS
[de-identified] : Romy Oquendo is a 59 year old female here for f/u of metastatic colon cancer.   Oncologic history: 1. metastatic colon cancer --original diagnosis in 2018.  s/p sigmoid colon resection for a [id number] cancer on 7/11/2018 at [facility] with Dr Jimenez. --received adjuvant chemo (? folfox?) --liver metastases (x 2) identified in 12/2021.  went on chemo with Folfiri + cetuximab with Dr Oziel Paulson.  Partial response by PET.  Partial liver resection 10/6/22.  two lesions, both adenocarcinoma consistent with colonic primary.  MMR intact.  margins negative.  characterized by pathology as "minimal/no treatment response."   --NGS on the liver metastases from 2022:  BRCA-2 frameshift mutation (pathogenic);  PIK3CA [id number];  p53.  DEJUAN, TMB low. --received more chemo "adjuvant" unclear exactly what  --10/2023:  found to have recurrent cancer in the liver + possible peritoneal recurrence + ovarian mass --s/p diagnostic laparoscopy ([facility]) + L oophorectomy ([facility]).  No peritoneal carcinomatosis.  Ovary with endometrioid adenocarcinoma, moderately differentiated, arising from a background of endometriosis in the L ovary/fallopian tube.  1.8 cm tumor.   --followed by robotic hysterectomy + R salpingo-oophorectomy, and extended liver resection on 11/9/23.  Pathology from the liver consistent with metastatic colon cancer.  Pathology from the GYN specimens benign --PET scan 2/27/24:  Extensive metastatic malignancy.   multiple new hypermetabolic low attenuation lesions in the liver, c/w new liver metastases, New hypermetabolic mesenteric lymph nodes inferior to the liver, New hypermetabolic subcutaneous, intramuscular, intraperitoneal nodules associated with the anterior abdominal wall, Multiple new b/l pulm nodules, the larger ones are hypermetabolic on pet, New hypermetabolic L hilar lymphadenopathy, Increase in size and intensity of R cardiophrenic lymphadenopathy --foundation one NGS on liver metastasis resected in 11/2023:  DEJUAN.  TMB = 1 mut/mB.  KAYLA, BRAF WT.  mutations detected in APC, BRCA-2, PTEN, BTG1, CTCF, TP53 --started Folfox + bevacizumab on 4/16/24 -- PET/CT on 7/17/2024 with improvement/stability of metastatic lesions and lymph nodes - no disease progression --pt with severe hypersensitivity reaction to oxaliplatin - despite pre-medication - in August 2024 reduced intensity of treatment to 5-FU and bevacizumab alone.  2. endometrial adenocarcinoma - stage IA- diagnosed 10/2023 - as above.  no adjuvant therapy  3.  BRCA-2 pathogenic mutation --s/p hysterectomy + BSO.  PMH, PSH reviewed in the chart [facility] - mother with ovarian cancer, MDS. SH- lives in [place].  Single. no children.  reports good support from family, friends.  nonsmoker, denies regular ETOH [de-identified] : Here for cycle 11 of 5-FU + bevacizumab. Feels well since last visit. Notes some mild increased fatigue, but does not interfere with her IADLs. Still having hot flashes nightly. No epistaxis, bruising, or hematuria. Normal BMs. Good appetite, has put on weight. Pain well controlled with cannabis and daily Vicodin PRN.

## 2024-09-26 NOTE — END OF VISIT
[] : Fellow [FreeTextEntry3] : I evaluated the patient with the Oncology fellow, Dr Martin.  Romy presents for continued chemotherapy treatment with 5-FU and bevacizumab.  No treatment-limiting toxicity.  she says that she is feeling well.  Labs reviewed and are OK for treatment. Noted rise in LFTs.   Plan of care as above.  Refilled vicodin and gabapentin.  Ordered PET-CT scan.  If CEA is climbing from today's labs, will arrange for earlier PET scan, espec in light of rising LFTs.

## 2024-09-26 NOTE — PHYSICAL EXAM
[Restricted in physically strenuous activity but ambulatory and able to carry out work of a light or sedentary nature] : Status 1- Restricted in physically strenuous activity but ambulatory and able to carry out work of a light or sedentary nature, e.g., light house work, office work [de-identified] : Small singular 1mm circular erythematous macular lesion on right forearm, without warmth, fluctuance, or induration [Normal] : normal appearance, no rash, nodules, vesicles, ulcers, erythema [de-identified] : Skin exam deferred [de-identified] : EOMI, no conjunctival injection, anicteric [de-identified] : No mucositis, no gum bleeding [de-identified] : supple, no cervical or supraclavicular LAD [de-identified] : right chest wall chemo port in place, no signs of infection

## 2024-09-27 ENCOUNTER — OUTPATIENT (OUTPATIENT)
Dept: OUTPATIENT SERVICES | Facility: HOSPITAL | Age: 59
LOS: 1 days | End: 2024-09-27
Payer: COMMERCIAL

## 2024-09-27 ENCOUNTER — APPOINTMENT (OUTPATIENT)
Dept: INFUSION THERAPY | Facility: CLINIC | Age: 59
End: 2024-09-27

## 2024-09-27 VITALS
SYSTOLIC BLOOD PRESSURE: 120 MMHG | DIASTOLIC BLOOD PRESSURE: 74 MMHG | OXYGEN SATURATION: 99 % | TEMPERATURE: 98 F | RESPIRATION RATE: 17 BRPM | HEART RATE: 82 BPM

## 2024-09-27 DIAGNOSIS — C18.9 MALIGNANT NEOPLASM OF COLON, UNSPECIFIED: ICD-10-CM

## 2024-09-27 DIAGNOSIS — Z90.79 ACQUIRED ABSENCE OF OTHER GENITAL ORGAN(S): Chronic | ICD-10-CM

## 2024-09-27 DIAGNOSIS — Z98.890 OTHER SPECIFIED POSTPROCEDURAL STATES: Chronic | ICD-10-CM

## 2024-09-27 DIAGNOSIS — Z90.89 ACQUIRED ABSENCE OF OTHER ORGANS: Chronic | ICD-10-CM

## 2024-09-27 DIAGNOSIS — Z90.49 ACQUIRED ABSENCE OF OTHER SPECIFIED PARTS OF DIGESTIVE TRACT: Chronic | ICD-10-CM

## 2024-09-27 PROCEDURE — 96523 IRRIG DRUG DELIVERY DEVICE: CPT

## 2024-09-27 RX ORDER — SODIUM CHLORIDE 0.9 % (FLUSH) 0.9 %
10 SYRINGE (ML) INJECTION ONCE
Refills: 0 | Status: COMPLETED | OUTPATIENT
Start: 2024-09-27 | End: 2024-09-27

## 2024-09-27 RX ADMIN — FLUOROURACIL 4530 MILLIGRAM(S): 50 INJECTION, SOLUTION INTRAVENOUS at 10:39

## 2024-09-27 RX ADMIN — Medication 10 MILLILITER(S): at 10:40

## 2024-09-27 NOTE — DISCHARGE INSTRUCTIONS: CHEMOTHERAPY - NSAPPTSFOLLOWUP_HEME_A_AMB
[facility]...
[facility]...

## 2024-09-27 NOTE — DISCHARGE INSTRUCTIONS: CHEMOTHERAPY - NSFACILITYCONTAFTRHOUR_HEME_A_AMB
[facility] ([phone])
[facility] ([phone])

## 2024-10-04 LAB — CEA SERPL-MCNC: 17.4 NG/ML

## 2024-10-09 ENCOUNTER — APPOINTMENT (OUTPATIENT)
Dept: INFUSION THERAPY | Facility: CLINIC | Age: 59
End: 2024-10-09

## 2024-10-09 ENCOUNTER — OUTPATIENT (OUTPATIENT)
Dept: OUTPATIENT SERVICES | Facility: HOSPITAL | Age: 59
LOS: 1 days | End: 2024-10-09
Payer: COMMERCIAL

## 2024-10-09 VITALS
OXYGEN SATURATION: 98 % | RESPIRATION RATE: 18 BRPM | TEMPERATURE: 98 F | HEIGHT: 67 IN | SYSTOLIC BLOOD PRESSURE: 116 MMHG | WEIGHT: 169.09 LBS | DIASTOLIC BLOOD PRESSURE: 74 MMHG | HEART RATE: 85 BPM

## 2024-10-09 DIAGNOSIS — Z90.79 ACQUIRED ABSENCE OF OTHER GENITAL ORGAN(S): Chronic | ICD-10-CM

## 2024-10-09 DIAGNOSIS — Z98.890 OTHER SPECIFIED POSTPROCEDURAL STATES: Chronic | ICD-10-CM

## 2024-10-09 DIAGNOSIS — Z90.49 ACQUIRED ABSENCE OF OTHER SPECIFIED PARTS OF DIGESTIVE TRACT: Chronic | ICD-10-CM

## 2024-10-09 DIAGNOSIS — C18.9 MALIGNANT NEOPLASM OF COLON, UNSPECIFIED: ICD-10-CM

## 2024-10-09 DIAGNOSIS — Z90.89 ACQUIRED ABSENCE OF OTHER ORGANS: Chronic | ICD-10-CM

## 2024-10-09 LAB
ALBUMIN SERPL ELPH-MCNC: 3.1 G/DL — LOW (ref 3.3–5)
ALP SERPL-CCNC: 114 U/L — SIGNIFICANT CHANGE UP (ref 40–120)
ALT FLD-CCNC: 35 U/L — SIGNIFICANT CHANGE UP (ref 10–45)
ANION GAP SERPL CALC-SCNC: 6 MMOL/L — SIGNIFICANT CHANGE UP (ref 5–17)
APPEARANCE UR: ABNORMAL
AST SERPL-CCNC: 28 U/L — SIGNIFICANT CHANGE UP (ref 10–40)
BACTERIA # UR AUTO: ABNORMAL /HPF
BILIRUB SERPL-MCNC: 2.2 MG/DL — HIGH (ref 0.2–1.2)
BILIRUB UR-MCNC: ABNORMAL
BUN SERPL-MCNC: 12 MG/DL — SIGNIFICANT CHANGE UP (ref 7–23)
BUN SERPL-MCNC: SIGNIFICANT CHANGE UP MG/DL (ref 7–23)
CALCIUM SERPL-MCNC: 8.9 MG/DL — SIGNIFICANT CHANGE UP (ref 8.4–10.5)
CAST: 4 /LPF — SIGNIFICANT CHANGE UP (ref 0–4)
CHLORIDE SERPL-SCNC: 106 MMOL/L — SIGNIFICANT CHANGE UP (ref 96–108)
CO2 SERPL-SCNC: 24 MMOL/L — SIGNIFICANT CHANGE UP (ref 22–31)
COLOR SPEC: ABNORMAL
CREAT SERPL-MCNC: 0.7 MG/DL — SIGNIFICANT CHANGE UP (ref 0.5–1.3)
DIFF PNL FLD: ABNORMAL
EGFR: 101 ML/MIN/1.73M2 — SIGNIFICANT CHANGE UP
GLUCOSE SERPL-MCNC: 137 MG/DL — HIGH (ref 70–99)
GLUCOSE UR QL: NEGATIVE MG/DL — SIGNIFICANT CHANGE UP
HCT VFR BLD CALC: 37.4 % — SIGNIFICANT CHANGE UP (ref 34.5–45)
HGB BLD-MCNC: 12.5 G/DL — SIGNIFICANT CHANGE UP (ref 11.5–15.5)
KETONES UR-MCNC: NEGATIVE MG/DL — SIGNIFICANT CHANGE UP
LEUKOCYTE ESTERASE UR-ACNC: ABNORMAL
LYMPHOCYTES # BLD AUTO: 1.1 K/UL — SIGNIFICANT CHANGE UP (ref 1–3.3)
LYMPHOCYTES # BLD AUTO: 9.7 % — LOW (ref 13–44)
MCHC RBC-ENTMCNC: 32.5 PG — SIGNIFICANT CHANGE UP (ref 27–34)
MCHC RBC-ENTMCNC: 33.4 GM/DL — SIGNIFICANT CHANGE UP (ref 32–36)
MCV RBC AUTO: 97.1 FL — SIGNIFICANT CHANGE UP (ref 80–100)
MUCOUS THREADS # UR AUTO: PRESENT
NEUTROPHILS # BLD AUTO: 9.5 K/UL — HIGH (ref 1.8–7.4)
NEUTROPHILS NFR BLD AUTO: 81.3 % — HIGH (ref 43–77)
NITRITE UR-MCNC: POSITIVE
PH UR: 5.5 — SIGNIFICANT CHANGE UP (ref 5–8)
PLATELET # BLD AUTO: 63 K/UL — LOW (ref 150–400)
POTASSIUM SERPL-MCNC: 4 MMOL/L — SIGNIFICANT CHANGE UP (ref 3.5–5.3)
POTASSIUM SERPL-SCNC: 4 MMOL/L — SIGNIFICANT CHANGE UP (ref 3.5–5.3)
PROT SERPL-MCNC: 7.1 G/DL — SIGNIFICANT CHANGE UP (ref 6–8.3)
PROT UR-MCNC: 100 MG/DL
RBC # BLD: 3.85 M/UL — SIGNIFICANT CHANGE UP (ref 3.8–5.2)
RBC # FLD: 17.1 % — HIGH (ref 10.3–14.5)
RBC CASTS # UR COMP ASSIST: 1 /HPF — SIGNIFICANT CHANGE UP (ref 0–4)
SODIUM SERPL-SCNC: 136 MMOL/L — SIGNIFICANT CHANGE UP (ref 135–145)
SP GR SPEC: >1.03 — HIGH (ref 1–1.03)
SQUAMOUS # UR AUTO: 17 /HPF — HIGH (ref 0–5)
UROBILINOGEN FLD QL: 2 MG/DL (ref 0.2–1)
WBC # BLD: 11.7 K/UL — HIGH (ref 3.8–10.5)
WBC # FLD AUTO: 11.7 K/UL — HIGH (ref 3.8–10.5)
WBC UR QL: 6 /HPF — HIGH (ref 0–5)

## 2024-10-09 PROCEDURE — 80053 COMPREHEN METABOLIC PANEL: CPT

## 2024-10-09 PROCEDURE — 81001 URINALYSIS AUTO W/SCOPE: CPT

## 2024-10-09 PROCEDURE — 85025 COMPLETE CBC W/AUTO DIFF WBC: CPT

## 2024-10-09 PROCEDURE — 84520 ASSAY OF UREA NITROGEN: CPT

## 2024-10-09 PROCEDURE — 36415 COLL VENOUS BLD VENIPUNCTURE: CPT

## 2024-10-23 ENCOUNTER — NON-APPOINTMENT (OUTPATIENT)
Age: 59
End: 2024-10-23

## 2024-10-23 ENCOUNTER — APPOINTMENT (OUTPATIENT)
Dept: HEMATOLOGY ONCOLOGY | Facility: CLINIC | Age: 59
End: 2024-10-23

## 2024-10-23 VITALS
HEART RATE: 87 BPM | WEIGHT: 165 LBS | HEIGHT: 67 IN | BODY MASS INDEX: 25.9 KG/M2 | SYSTOLIC BLOOD PRESSURE: 118 MMHG | DIASTOLIC BLOOD PRESSURE: 80 MMHG | OXYGEN SATURATION: 99 % | RESPIRATION RATE: 18 BRPM | TEMPERATURE: 97.6 F

## 2024-10-23 DIAGNOSIS — C18.9 MALIGNANT NEOPLASM OF COLON, UNSPECIFIED: ICD-10-CM

## 2024-10-23 DIAGNOSIS — C78.7 MALIGNANT NEOPLASM OF COLON, UNSPECIFIED: ICD-10-CM

## 2024-10-23 LAB
ALBUMIN SERPL ELPH-MCNC: 3.7 G/DL
ALP BLD-CCNC: 138 U/L
ALT SERPL-CCNC: 26 U/L
ANION GAP SERPL CALC-SCNC: 7 MMOL/L
AST SERPL-CCNC: 39 U/L
BILIRUB SERPL-MCNC: 1 MG/DL
BUN SERPL-MCNC: 15 MG/DL
CALCIUM SERPL-MCNC: 10.1 MG/DL
CEA SERPL-MCNC: 13.3 NG/ML
CHLORIDE SERPL-SCNC: 109 MMOL/L
CO2 SERPL-SCNC: 24 MMOL/L
CREAT SERPL-MCNC: 0.8 MG/DL
EGFR: 86 ML/MIN/1.73M2
GLUCOSE SERPL-MCNC: 109 MG/DL
HCT VFR BLD CALC: 42 %
HGB BLD-MCNC: 13.8 G/DL
LYMPHOCYTES # BLD AUTO: 2.2 K/UL
LYMPHOCYTES NFR BLD AUTO: 28.7 %
MAN DIFF?: NO
MCHC RBC-ENTMCNC: 32.2 PG
MCHC RBC-ENTMCNC: 32.9 GM/DL
MCV RBC AUTO: 98.1 FL
NEUTROPHILS # BLD AUTO: 4.7 K/UL
NEUTROPHILS NFR BLD AUTO: 62.6 %
PLATELET # BLD AUTO: 195 K/UL
POTASSIUM SERPL-SCNC: 4.1 MMOL/L
PROT SERPL-MCNC: 7.3 G/DL
RBC # BLD: 4.28 M/UL
RBC # FLD: 17.3 %
SODIUM SERPL-SCNC: 140 MMOL/L
WBC # FLD AUTO: 7.6 K/UL

## 2024-10-23 PROCEDURE — 99215 OFFICE O/P EST HI 40 MIN: CPT | Mod: 25

## 2024-10-23 RX ORDER — DOXYCYCLINE HYCLATE 100 MG/1
100 CAPSULE ORAL
Qty: 60 | Refills: 4 | Status: ACTIVE | COMMUNITY
Start: 2024-10-23 | End: 1900-01-01

## 2024-10-28 RX ORDER — CALCIUM FOLINATE 10 MG/ML
740 INJECTION, SOLUTION INTRAMUSCULAR; INTRAVENOUS ONCE
Refills: 0 | Status: COMPLETED | OUTPATIENT
Start: 2024-10-30 | End: 2024-10-30

## 2024-10-28 RX ORDER — FOSAPREPITANT 150 MG/5ML
150 INJECTION, POWDER, LYOPHILIZED, FOR SOLUTION INTRAVENOUS ONCE
Refills: 0 | Status: COMPLETED | OUTPATIENT
Start: 2024-10-30 | End: 2024-10-30

## 2024-10-28 RX ORDER — LIDOCAINE HYDROCHLORIDE 40 MG/ML
1 SOLUTION TOPICAL ONCE
Refills: 0 | Status: COMPLETED | OUTPATIENT
Start: 2024-10-30 | End: 2024-10-30

## 2024-10-28 RX ORDER — DEXAMETHASONE 1.5 MG 1.5 MG/1
10 TABLET ORAL ONCE
Refills: 0 | Status: COMPLETED | OUTPATIENT
Start: 2024-10-30 | End: 2024-10-30

## 2024-10-28 RX ORDER — PALONOSETRON HYDROCHLORIDE 0.05 MG/ML
0.25 INJECTION INTRAVENOUS ONCE
Refills: 0 | Status: COMPLETED | OUTPATIENT
Start: 2024-10-30 | End: 2024-10-30

## 2024-10-30 ENCOUNTER — OUTPATIENT (OUTPATIENT)
Dept: OUTPATIENT SERVICES | Facility: HOSPITAL | Age: 59
LOS: 1 days | End: 2024-10-30
Payer: COMMERCIAL

## 2024-10-30 ENCOUNTER — APPOINTMENT (OUTPATIENT)
Dept: INFUSION THERAPY | Facility: CLINIC | Age: 59
End: 2024-10-30

## 2024-10-30 VITALS
DIASTOLIC BLOOD PRESSURE: 85 MMHG | TEMPERATURE: 98 F | HEART RATE: 78 BPM | HEIGHT: 67 IN | RESPIRATION RATE: 18 BRPM | OXYGEN SATURATION: 99 % | WEIGHT: 164.91 LBS | SYSTOLIC BLOOD PRESSURE: 130 MMHG

## 2024-10-30 DIAGNOSIS — Z98.890 OTHER SPECIFIED POSTPROCEDURAL STATES: Chronic | ICD-10-CM

## 2024-10-30 DIAGNOSIS — Z90.49 ACQUIRED ABSENCE OF OTHER SPECIFIED PARTS OF DIGESTIVE TRACT: Chronic | ICD-10-CM

## 2024-10-30 DIAGNOSIS — C18.9 MALIGNANT NEOPLASM OF COLON, UNSPECIFIED: ICD-10-CM

## 2024-10-30 DIAGNOSIS — Z90.79 ACQUIRED ABSENCE OF OTHER GENITAL ORGAN(S): Chronic | ICD-10-CM

## 2024-10-30 LAB
ALBUMIN SERPL ELPH-MCNC: 3.5 G/DL — SIGNIFICANT CHANGE UP (ref 3.3–5)
ALP SERPL-CCNC: 134 U/L — HIGH (ref 40–120)
ALT FLD-CCNC: 29 U/L — SIGNIFICANT CHANGE UP (ref 10–45)
ANION GAP SERPL CALC-SCNC: 9 MMOL/L — SIGNIFICANT CHANGE UP (ref 5–17)
AST SERPL-CCNC: 39 U/L — SIGNIFICANT CHANGE UP (ref 10–40)
BILIRUB SERPL-MCNC: 1 MG/DL — SIGNIFICANT CHANGE UP (ref 0.2–1.2)
BUN SERPL-MCNC: 16 MG/DL — SIGNIFICANT CHANGE UP (ref 7–23)
CALCIUM SERPL-MCNC: 10.2 MG/DL — SIGNIFICANT CHANGE UP (ref 8.4–10.5)
CHLORIDE SERPL-SCNC: 109 MMOL/L — HIGH (ref 96–108)
CO2 SERPL-SCNC: 24 MMOL/L — SIGNIFICANT CHANGE UP (ref 22–31)
CREAT SERPL-MCNC: 0.6 MG/DL — SIGNIFICANT CHANGE UP (ref 0.5–1.3)
EGFR: 105 ML/MIN/1.73M2 — SIGNIFICANT CHANGE UP
GLUCOSE SERPL-MCNC: 136 MG/DL — HIGH (ref 70–99)
HCT VFR BLD CALC: 40.5 % — SIGNIFICANT CHANGE UP (ref 34.5–45)
HGB BLD-MCNC: 13.3 G/DL — SIGNIFICANT CHANGE UP (ref 11.5–15.5)
LYMPHOCYTES # BLD AUTO: 1.5 K/UL — SIGNIFICANT CHANGE UP (ref 1–3.3)
LYMPHOCYTES # BLD AUTO: 17.4 % — SIGNIFICANT CHANGE UP (ref 13–44)
MAGNESIUM SERPL-MCNC: 1.7 MG/DL — SIGNIFICANT CHANGE UP (ref 1.6–2.6)
MCHC RBC-ENTMCNC: 31.9 PG — SIGNIFICANT CHANGE UP (ref 27–34)
MCHC RBC-ENTMCNC: 32.8 G/DL — SIGNIFICANT CHANGE UP (ref 32–36)
MCV RBC AUTO: 97.1 FL — SIGNIFICANT CHANGE UP (ref 80–100)
NEUTROPHILS # BLD AUTO: 6.7 K/UL — SIGNIFICANT CHANGE UP (ref 1.8–7.4)
NEUTROPHILS NFR BLD AUTO: 75.3 % — SIGNIFICANT CHANGE UP (ref 43–77)
PLATELET # BLD AUTO: 158 K/UL — SIGNIFICANT CHANGE UP (ref 150–400)
POTASSIUM SERPL-MCNC: 4 MMOL/L — SIGNIFICANT CHANGE UP (ref 3.5–5.3)
POTASSIUM SERPL-SCNC: 4 MMOL/L — SIGNIFICANT CHANGE UP (ref 3.5–5.3)
PROT SERPL-MCNC: 7.1 G/DL — SIGNIFICANT CHANGE UP (ref 6–8.3)
RBC # BLD: 4.17 M/UL — SIGNIFICANT CHANGE UP (ref 3.8–5.2)
RBC # FLD: 16.9 % — HIGH (ref 10.3–14.5)
SODIUM SERPL-SCNC: 142 MMOL/L — SIGNIFICANT CHANGE UP (ref 135–145)
WBC # BLD: 8.8 K/UL — SIGNIFICANT CHANGE UP (ref 3.8–10.5)
WBC # FLD AUTO: 8.8 K/UL — SIGNIFICANT CHANGE UP (ref 3.8–10.5)

## 2024-10-30 PROCEDURE — 96417 CHEMO IV INFUS EACH ADDL SEQ: CPT

## 2024-10-30 PROCEDURE — 96413 CHEMO IV INFUSION 1 HR: CPT

## 2024-10-30 PROCEDURE — 85025 COMPLETE CBC W/AUTO DIFF WBC: CPT

## 2024-10-30 PROCEDURE — 96411 CHEMO IV PUSH ADDL DRUG: CPT

## 2024-10-30 PROCEDURE — 83735 ASSAY OF MAGNESIUM: CPT

## 2024-10-30 PROCEDURE — 96416 CHEMO PROLONG INFUSE W/PUMP: CPT

## 2024-10-30 PROCEDURE — 80053 COMPREHEN METABOLIC PANEL: CPT

## 2024-10-30 PROCEDURE — 36415 COLL VENOUS BLD VENIPUNCTURE: CPT

## 2024-10-30 PROCEDURE — 96375 TX/PRO/DX INJ NEW DRUG ADDON: CPT

## 2024-10-30 RX ORDER — FLUOROURACIL 50 MG/ML
740 INJECTION, SOLUTION INTRAVENOUS ONCE
Refills: 0 | Status: COMPLETED | OUTPATIENT
Start: 2024-10-30 | End: 2024-10-30

## 2024-10-30 RX ORDER — IRINOTECAN HCL 40 MG/2 ML
280 VIAL (ML) INTRAVENOUS ONCE
Refills: 0 | Status: COMPLETED | OUTPATIENT
Start: 2024-10-30 | End: 2024-10-30

## 2024-10-30 RX ORDER — PANITUMUMAB 400 MG/20ML
445 SOLUTION INTRAVENOUS ONCE
Refills: 0 | Status: COMPLETED | OUTPATIENT
Start: 2024-10-30 | End: 2024-10-30

## 2024-10-30 RX ORDER — FLUOROURACIL 50 MG/ML
4460 INJECTION, SOLUTION INTRAVENOUS ONCE
Refills: 0 | Status: COMPLETED | OUTPATIENT
Start: 2024-10-30 | End: 2024-10-30

## 2024-10-30 RX ADMIN — PALONOSETRON HYDROCHLORIDE 0.25 MILLIGRAM(S): 0.05 INJECTION INTRAVENOUS at 10:10

## 2024-10-30 RX ADMIN — Medication 0.4 MILLIGRAM(S): at 09:00

## 2024-10-30 RX ADMIN — DEXAMETHASONE 1.5 MG 204 MILLIGRAM(S): 1.5 TABLET ORAL at 09:10

## 2024-10-30 RX ADMIN — CALCIUM FOLINATE 740 MILLIGRAM(S): 10 INJECTION, SOLUTION INTRAMUSCULAR; INTRAVENOUS at 13:00

## 2024-10-30 RX ADMIN — PANITUMUMAB 445 MILLIGRAM(S): 400 SOLUTION INTRAVENOUS at 11:20

## 2024-10-30 RX ADMIN — FOSAPREPITANT 500 MILLIGRAM(S): 150 INJECTION, POWDER, LYOPHILIZED, FOR SOLUTION INTRAVENOUS at 09:30

## 2024-10-30 RX ADMIN — FOSAPREPITANT 150 MILLIGRAM(S): 150 INJECTION, POWDER, LYOPHILIZED, FOR SOLUTION INTRAVENOUS at 10:00

## 2024-10-30 RX ADMIN — FLUOROURACIL 177.6 MILLIGRAM(S): 50 INJECTION, SOLUTION INTRAVENOUS at 13:20

## 2024-10-30 RX ADMIN — Medication 280 MILLIGRAM(S): at 13:00

## 2024-10-30 RX ADMIN — PANITUMUMAB 445 MILLIGRAM(S): 400 SOLUTION INTRAVENOUS at 10:20

## 2024-10-30 RX ADMIN — Medication 280 MILLIGRAM(S): at 11:30

## 2024-10-30 RX ADMIN — FLUOROURACIL 4460 MILLIGRAM(S): 50 INJECTION, SOLUTION INTRAVENOUS at 13:30

## 2024-10-30 RX ADMIN — CALCIUM FOLINATE 740 MILLIGRAM(S): 10 INJECTION, SOLUTION INTRAMUSCULAR; INTRAVENOUS at 11:30

## 2024-10-30 RX ADMIN — DEXAMETHASONE 1.5 MG 10 MILLIGRAM(S): 1.5 TABLET ORAL at 09:25

## 2024-11-01 ENCOUNTER — APPOINTMENT (OUTPATIENT)
Dept: INFUSION THERAPY | Facility: CLINIC | Age: 59
End: 2024-11-01

## 2024-11-01 ENCOUNTER — OUTPATIENT (OUTPATIENT)
Dept: OUTPATIENT SERVICES | Facility: HOSPITAL | Age: 59
LOS: 1 days | End: 2024-11-01
Payer: COMMERCIAL

## 2024-11-01 VITALS
OXYGEN SATURATION: 99 % | SYSTOLIC BLOOD PRESSURE: 122 MMHG | DIASTOLIC BLOOD PRESSURE: 74 MMHG | HEART RATE: 78 BPM | RESPIRATION RATE: 18 BRPM | TEMPERATURE: 98 F

## 2024-11-01 DIAGNOSIS — C18.9 MALIGNANT NEOPLASM OF COLON, UNSPECIFIED: ICD-10-CM

## 2024-11-01 DIAGNOSIS — Z90.89 ACQUIRED ABSENCE OF OTHER ORGANS: Chronic | ICD-10-CM

## 2024-11-01 DIAGNOSIS — Z98.890 OTHER SPECIFIED POSTPROCEDURAL STATES: Chronic | ICD-10-CM

## 2024-11-01 DIAGNOSIS — Z90.49 ACQUIRED ABSENCE OF OTHER SPECIFIED PARTS OF DIGESTIVE TRACT: Chronic | ICD-10-CM

## 2024-11-01 DIAGNOSIS — Z90.79 ACQUIRED ABSENCE OF OTHER GENITAL ORGAN(S): Chronic | ICD-10-CM

## 2024-11-01 PROCEDURE — 96372 THER/PROPH/DIAG INJ SC/IM: CPT

## 2024-11-01 RX ORDER — SODIUM CHLORIDE 9 MG/ML
10 INJECTION, SOLUTION INTRAMUSCULAR; INTRAVENOUS; SUBCUTANEOUS ONCE
Refills: 0 | Status: COMPLETED | OUTPATIENT
Start: 2024-11-01 | End: 2024-11-01

## 2024-11-01 RX ORDER — PEGFILGRASTIM-CBQV 6 MG/.6ML
6 INJECTION, SOLUTION SUBCUTANEOUS ONCE
Refills: 0 | Status: COMPLETED | OUTPATIENT
Start: 2024-11-01 | End: 2024-11-01

## 2024-11-01 RX ADMIN — PEGFILGRASTIM-CBQV 6 MILLIGRAM(S): 6 INJECTION, SOLUTION SUBCUTANEOUS at 13:00

## 2024-11-01 RX ADMIN — SODIUM CHLORIDE 10 MILLILITER(S): 9 INJECTION, SOLUTION INTRAMUSCULAR; INTRAVENOUS; SUBCUTANEOUS at 13:23

## 2024-11-11 RX ORDER — LIDOCAINE HYDROCHLORIDE 40 MG/ML
1 SOLUTION TOPICAL ONCE
Refills: 0 | Status: COMPLETED | OUTPATIENT
Start: 2024-11-13 | End: 2024-11-13

## 2024-11-13 ENCOUNTER — APPOINTMENT (OUTPATIENT)
Dept: HEMATOLOGY ONCOLOGY | Facility: CLINIC | Age: 59
End: 2024-11-13
Payer: MEDICAID

## 2024-11-13 ENCOUNTER — APPOINTMENT (OUTPATIENT)
Dept: INFUSION THERAPY | Facility: CLINIC | Age: 59
End: 2024-11-13

## 2024-11-13 ENCOUNTER — OUTPATIENT (OUTPATIENT)
Dept: OUTPATIENT SERVICES | Facility: HOSPITAL | Age: 59
LOS: 1 days | End: 2024-11-13
Payer: COMMERCIAL

## 2024-11-13 VITALS
HEART RATE: 95 BPM | OXYGEN SATURATION: 100 % | WEIGHT: 166.01 LBS | SYSTOLIC BLOOD PRESSURE: 124 MMHG | RESPIRATION RATE: 18 BRPM | DIASTOLIC BLOOD PRESSURE: 85 MMHG | TEMPERATURE: 98 F | HEIGHT: 67 IN

## 2024-11-13 VITALS
TEMPERATURE: 97.7 F | OXYGEN SATURATION: 100 % | HEART RATE: 95 BPM | SYSTOLIC BLOOD PRESSURE: 124 MMHG | HEIGHT: 67 IN | DIASTOLIC BLOOD PRESSURE: 85 MMHG | WEIGHT: 166 LBS | BODY MASS INDEX: 26.06 KG/M2 | RESPIRATION RATE: 18 BRPM

## 2024-11-13 DIAGNOSIS — C18.9 MALIGNANT NEOPLASM OF COLON, UNSPECIFIED: ICD-10-CM

## 2024-11-13 DIAGNOSIS — Z90.89 ACQUIRED ABSENCE OF OTHER ORGANS: Chronic | ICD-10-CM

## 2024-11-13 DIAGNOSIS — L27.0 GENERALIZED SKIN ERUPTION DUE TO DRUGS AND MEDICAMENTS TAKEN INTERNALLY: ICD-10-CM

## 2024-11-13 DIAGNOSIS — Z98.890 OTHER SPECIFIED POSTPROCEDURAL STATES: Chronic | ICD-10-CM

## 2024-11-13 DIAGNOSIS — G89.3 NEOPLASM RELATED PAIN (ACUTE) (CHRONIC): ICD-10-CM

## 2024-11-13 DIAGNOSIS — C78.7 MALIGNANT NEOPLASM OF COLON, UNSPECIFIED: ICD-10-CM

## 2024-11-13 DIAGNOSIS — T45.1X5A OTHER SECONDARY THROMBOCYTOPENIA: ICD-10-CM

## 2024-11-13 DIAGNOSIS — D69.59 OTHER SECONDARY THROMBOCYTOPENIA: ICD-10-CM

## 2024-11-13 DIAGNOSIS — R63.0 ANOREXIA: ICD-10-CM

## 2024-11-13 DIAGNOSIS — R23.8 OTHER SKIN CHANGES: ICD-10-CM

## 2024-11-13 DIAGNOSIS — C78.00 SECONDARY MALIGNANT NEOPLASM OF UNSPECIFIED LUNG: ICD-10-CM

## 2024-11-13 LAB
ALBUMIN SERPL ELPH-MCNC: 3.8 G/DL
ALP BLD-CCNC: 155 U/L
ALT SERPL-CCNC: 28 U/L
ANION GAP SERPL CALC-SCNC: 0 MMOL/L
AST SERPL-CCNC: 31 U/L
BILIRUB SERPL-MCNC: 0.7 MG/DL
BUN SERPL-MCNC: 10 MG/DL
CALCIUM SERPL-MCNC: 9.7 MG/DL
CHLORIDE SERPL-SCNC: 111 MMOL/L
CO2 SERPL-SCNC: 27 MMOL/L
CREAT SERPL-MCNC: 0.5 MG/DL
EGFR: 109 ML/MIN/1.73M2
GLUCOSE SERPL-MCNC: 109 MG/DL
HCT VFR BLD CALC: 41.7 %
HGB BLD-MCNC: 13.8 G/DL
LYMPHOCYTES # BLD AUTO: 2.5 K/UL
LYMPHOCYTES NFR BLD AUTO: 17.9 %
MAGNESIUM SERPL-MCNC: 1.8 MG/DL
MAN DIFF?: NO
MCHC RBC-ENTMCNC: 32.2 PG
MCHC RBC-ENTMCNC: 33.1 G/DL
MCV RBC AUTO: 97.4 FL
NEUTROPHILS # BLD AUTO: 10.8 K/UL
NEUTROPHILS NFR BLD AUTO: 79.2 %
PLATELET # BLD AUTO: 127 K/UL
POTASSIUM SERPL-SCNC: 4.4 MMOL/L
PROT SERPL-MCNC: 6.7 G/DL
RBC # BLD: 4.28 M/UL
RBC # FLD: 17.1 %
SODIUM SERPL-SCNC: 138 MMOL/L
WBC # FLD AUTO: 13.7 K/UL

## 2024-11-13 PROCEDURE — 96416 CHEMO PROLONG INFUSE W/PUMP: CPT

## 2024-11-13 PROCEDURE — 96417 CHEMO IV INFUS EACH ADDL SEQ: CPT

## 2024-11-13 PROCEDURE — 96413 CHEMO IV INFUSION 1 HR: CPT

## 2024-11-13 PROCEDURE — 96411 CHEMO IV PUSH ADDL DRUG: CPT

## 2024-11-13 PROCEDURE — 96375 TX/PRO/DX INJ NEW DRUG ADDON: CPT

## 2024-11-13 PROCEDURE — 99214 OFFICE O/P EST MOD 30 MIN: CPT | Mod: 25

## 2024-11-13 PROCEDURE — 96372 THER/PROPH/DIAG INJ SC/IM: CPT

## 2024-11-13 PROCEDURE — 96367 TX/PROPH/DG ADDL SEQ IV INF: CPT

## 2024-11-13 RX ORDER — PANITUMUMAB 400 MG/20ML
445 SOLUTION INTRAVENOUS ONCE
Refills: 0 | Status: COMPLETED | OUTPATIENT
Start: 2024-11-13 | End: 2024-11-13

## 2024-11-13 RX ORDER — FLUOROURACIL 50 MG/ML
4460 INJECTION, SOLUTION INTRAVENOUS ONCE
Refills: 0 | Status: COMPLETED | OUTPATIENT
Start: 2024-11-13 | End: 2024-11-13

## 2024-11-13 RX ORDER — CALCIUM FOLINATE 10 MG/ML
740 INJECTION, SOLUTION INTRAMUSCULAR; INTRAVENOUS ONCE
Refills: 0 | Status: COMPLETED | OUTPATIENT
Start: 2024-11-13 | End: 2024-11-13

## 2024-11-13 RX ORDER — IRINOTECAN HCL 40 MG/2 ML
280 VIAL (ML) INTRAVENOUS ONCE
Refills: 0 | Status: COMPLETED | OUTPATIENT
Start: 2024-11-13 | End: 2024-11-13

## 2024-11-13 RX ORDER — PALONOSETRON HYDROCHLORIDE 0.05 MG/ML
0.25 INJECTION INTRAVENOUS ONCE
Refills: 0 | Status: COMPLETED | OUTPATIENT
Start: 2024-11-13 | End: 2024-11-13

## 2024-11-13 RX ORDER — FOSAPREPITANT 150 MG/5ML
150 INJECTION, POWDER, LYOPHILIZED, FOR SOLUTION INTRAVENOUS ONCE
Refills: 0 | Status: COMPLETED | OUTPATIENT
Start: 2024-11-13 | End: 2024-11-13

## 2024-11-13 RX ORDER — FLUOROURACIL 50 MG/ML
740 INJECTION, SOLUTION INTRAVENOUS ONCE
Refills: 0 | Status: COMPLETED | OUTPATIENT
Start: 2024-11-13 | End: 2024-11-13

## 2024-11-13 RX ORDER — DEXAMETHASONE 1.5 MG 1.5 MG/1
10 TABLET ORAL ONCE
Refills: 0 | Status: COMPLETED | OUTPATIENT
Start: 2024-11-13 | End: 2024-11-13

## 2024-11-13 RX ADMIN — Medication 280 MILLIGRAM(S): at 14:45

## 2024-11-13 RX ADMIN — FLUOROURACIL 4460 MILLIGRAM(S): 50 INJECTION, SOLUTION INTRAVENOUS at 12:58

## 2024-11-13 RX ADMIN — CALCIUM FOLINATE 740 MILLIGRAM(S): 10 INJECTION, SOLUTION INTRAMUSCULAR; INTRAVENOUS at 14:40

## 2024-11-13 RX ADMIN — Medication 280 MILLIGRAM(S): at 12:55

## 2024-11-13 RX ADMIN — CALCIUM FOLINATE 740 MILLIGRAM(S): 10 INJECTION, SOLUTION INTRAMUSCULAR; INTRAVENOUS at 12:55

## 2024-11-13 RX ADMIN — DEXAMETHASONE 1.5 MG 204 MILLIGRAM(S): 1.5 TABLET ORAL at 11:51

## 2024-11-13 RX ADMIN — PANITUMUMAB 445 MILLIGRAM(S): 400 SOLUTION INTRAVENOUS at 11:09

## 2024-11-13 RX ADMIN — Medication 0.4 MILLIGRAM(S): at 13:17

## 2024-11-13 RX ADMIN — FOSAPREPITANT 500 MILLIGRAM(S): 150 INJECTION, POWDER, LYOPHILIZED, FOR SOLUTION INTRAVENOUS at 12:34

## 2024-11-13 RX ADMIN — PALONOSETRON HYDROCHLORIDE 0.25 MILLIGRAM(S): 0.05 INJECTION INTRAVENOUS at 13:10

## 2024-11-13 RX ADMIN — FOSAPREPITANT 150 MILLIGRAM(S): 150 INJECTION, POWDER, LYOPHILIZED, FOR SOLUTION INTRAVENOUS at 13:25

## 2024-11-13 RX ADMIN — DEXAMETHASONE 1.5 MG 10 MILLIGRAM(S): 1.5 TABLET ORAL at 12:06

## 2024-11-13 RX ADMIN — PANITUMUMAB 445 MILLIGRAM(S): 400 SOLUTION INTRAVENOUS at 11:40

## 2024-11-13 RX ADMIN — FLUOROURACIL 177.6 MILLIGRAM(S): 50 INJECTION, SOLUTION INTRAVENOUS at 12:56

## 2024-11-13 RX ADMIN — LIDOCAINE HYDROCHLORIDE 1 APPLICATION(S): 40 SOLUTION TOPICAL at 09:30

## 2024-11-13 NOTE — PHARMACY COMMUNICATION NOTE - COMMENTS
pt tolerated first dose Vectibix without reaction, can be infused over 30 min today per Hyea  may need a new order with 30 min infusion time next time

## 2024-11-15 ENCOUNTER — APPOINTMENT (OUTPATIENT)
Dept: INFUSION THERAPY | Facility: CLINIC | Age: 59
End: 2024-11-15

## 2024-11-15 ENCOUNTER — OUTPATIENT (OUTPATIENT)
Dept: OUTPATIENT SERVICES | Facility: HOSPITAL | Age: 59
LOS: 1 days | End: 2024-11-15
Payer: COMMERCIAL

## 2024-11-15 VITALS
WEIGHT: 166.01 LBS | HEART RATE: 90 BPM | OXYGEN SATURATION: 97 % | DIASTOLIC BLOOD PRESSURE: 84 MMHG | RESPIRATION RATE: 18 BRPM | TEMPERATURE: 97 F | HEIGHT: 67 IN | SYSTOLIC BLOOD PRESSURE: 118 MMHG

## 2024-11-15 DIAGNOSIS — Z98.890 OTHER SPECIFIED POSTPROCEDURAL STATES: Chronic | ICD-10-CM

## 2024-11-15 DIAGNOSIS — Z90.89 ACQUIRED ABSENCE OF OTHER ORGANS: Chronic | ICD-10-CM

## 2024-11-15 DIAGNOSIS — C18.9 MALIGNANT NEOPLASM OF COLON, UNSPECIFIED: ICD-10-CM

## 2024-11-15 DIAGNOSIS — Z90.79 ACQUIRED ABSENCE OF OTHER GENITAL ORGAN(S): Chronic | ICD-10-CM

## 2024-11-15 DIAGNOSIS — Z90.49 ACQUIRED ABSENCE OF OTHER SPECIFIED PARTS OF DIGESTIVE TRACT: Chronic | ICD-10-CM

## 2024-11-15 PROCEDURE — 96372 THER/PROPH/DIAG INJ SC/IM: CPT

## 2024-11-15 RX ORDER — PEGFILGRASTIM-CBQV 6 MG/.6ML
6 INJECTION, SOLUTION SUBCUTANEOUS ONCE
Refills: 0 | Status: COMPLETED | OUTPATIENT
Start: 2024-11-15 | End: 2024-11-15

## 2024-11-15 RX ORDER — SODIUM CHLORIDE 9 MG/ML
10 INJECTION, SOLUTION INTRAMUSCULAR; INTRAVENOUS; SUBCUTANEOUS ONCE
Refills: 0 | Status: COMPLETED | OUTPATIENT
Start: 2024-11-15 | End: 2024-11-15

## 2024-11-15 RX ADMIN — PEGFILGRASTIM-CBQV 6 MILLIGRAM(S): 6 INJECTION, SOLUTION SUBCUTANEOUS at 14:20

## 2024-11-15 RX ADMIN — SODIUM CHLORIDE 10 MILLILITER(S): 9 INJECTION, SOLUTION INTRAMUSCULAR; INTRAVENOUS; SUBCUTANEOUS at 14:18

## 2024-11-17 PROBLEM — L27.0 DRUG-INDUCED SKIN RASH: Status: ACTIVE | Noted: 2024-11-17

## 2024-11-17 PROBLEM — R23.8 SKIN IRRITATION: Noted: 2024-05-02

## 2024-11-17 PROBLEM — R63.0 POOR APPETITE: Status: RESOLVED | Noted: 2024-05-15 | Resolved: 2024-11-17

## 2024-11-20 ENCOUNTER — NON-APPOINTMENT (OUTPATIENT)
Age: 59
End: 2024-11-20

## 2024-12-04 ENCOUNTER — APPOINTMENT (OUTPATIENT)
Dept: HEMATOLOGY ONCOLOGY | Facility: CLINIC | Age: 59
End: 2024-12-04

## 2024-12-04 ENCOUNTER — OUTPATIENT (OUTPATIENT)
Dept: OUTPATIENT SERVICES | Facility: HOSPITAL | Age: 59
LOS: 1 days | End: 2024-12-04
Payer: COMMERCIAL

## 2024-12-04 ENCOUNTER — APPOINTMENT (OUTPATIENT)
Dept: INFUSION THERAPY | Facility: CLINIC | Age: 59
End: 2024-12-04

## 2024-12-04 VITALS
OXYGEN SATURATION: 99 % | SYSTOLIC BLOOD PRESSURE: 105 MMHG | BODY MASS INDEX: 26.21 KG/M2 | TEMPERATURE: 97.4 F | WEIGHT: 167 LBS | HEIGHT: 67 IN | DIASTOLIC BLOOD PRESSURE: 72 MMHG | HEART RATE: 106 BPM | RESPIRATION RATE: 18 BRPM

## 2024-12-04 VITALS
OXYGEN SATURATION: 98 % | HEART RATE: 84 BPM | RESPIRATION RATE: 18 BRPM | SYSTOLIC BLOOD PRESSURE: 117 MMHG | TEMPERATURE: 97 F | DIASTOLIC BLOOD PRESSURE: 70 MMHG

## 2024-12-04 VITALS
SYSTOLIC BLOOD PRESSURE: 113 MMHG | TEMPERATURE: 98 F | RESPIRATION RATE: 18 BRPM | OXYGEN SATURATION: 100 % | WEIGHT: 166.89 LBS | HEIGHT: 67 IN | HEART RATE: 90 BPM | DIASTOLIC BLOOD PRESSURE: 73 MMHG

## 2024-12-04 DIAGNOSIS — C18.9 MALIGNANT NEOPLASM OF COLON, UNSPECIFIED: ICD-10-CM

## 2024-12-04 DIAGNOSIS — Z98.890 OTHER SPECIFIED POSTPROCEDURAL STATES: Chronic | ICD-10-CM

## 2024-12-04 DIAGNOSIS — L27.0 GENERALIZED SKIN ERUPTION DUE TO DRUGS AND MEDICAMENTS TAKEN INTERNALLY: ICD-10-CM

## 2024-12-04 DIAGNOSIS — Z90.49 ACQUIRED ABSENCE OF OTHER SPECIFIED PARTS OF DIGESTIVE TRACT: Chronic | ICD-10-CM

## 2024-12-04 DIAGNOSIS — Z90.89 ACQUIRED ABSENCE OF OTHER ORGANS: Chronic | ICD-10-CM

## 2024-12-04 DIAGNOSIS — C78.6 SECONDARY MALIGNANT NEOPLASM OF RETROPERITONEUM AND PERITONEUM: ICD-10-CM

## 2024-12-04 DIAGNOSIS — C78.7 MALIGNANT NEOPLASM OF COLON, UNSPECIFIED: ICD-10-CM

## 2024-12-04 DIAGNOSIS — Z90.79 ACQUIRED ABSENCE OF OTHER GENITAL ORGAN(S): Chronic | ICD-10-CM

## 2024-12-04 DIAGNOSIS — C78.00 SECONDARY MALIGNANT NEOPLASM OF UNSPECIFIED LUNG: ICD-10-CM

## 2024-12-04 DIAGNOSIS — G89.3 NEOPLASM RELATED PAIN (ACUTE) (CHRONIC): ICD-10-CM

## 2024-12-04 LAB
ALBUMIN SERPL ELPH-MCNC: 3.2 G/DL
ALP BLD-CCNC: 149 U/L
ALT SERPL-CCNC: 57 U/L
ANION GAP SERPL CALC-SCNC: 6 MMOL/L
AST SERPL-CCNC: 51 U/L
BILIRUB SERPL-MCNC: 0.6 MG/DL
BUN SERPL-MCNC: 15 MG/DL
CALCIUM SERPL-MCNC: 9.4 MG/DL
CHLORIDE SERPL-SCNC: 110 MMOL/L
CO2 SERPL-SCNC: 27 MMOL/L
CREAT SERPL-MCNC: 0.9 MG/DL
EGFR: 75 ML/MIN/1.73M2
GLUCOSE SERPL-MCNC: 112 MG/DL
HCT VFR BLD CALC: 39.4 %
HGB BLD-MCNC: 12.8 G/DL
LYMPHOCYTES # BLD AUTO: 2.7 K/UL
LYMPHOCYTES NFR BLD AUTO: 23.4 %
MAGNESIUM SERPL-MCNC: 1.7 MG/DL
MAN DIFF?: NO
MCHC RBC-ENTMCNC: 32.2 PG
MCHC RBC-ENTMCNC: 32.5 G/DL
MCV RBC AUTO: 99 FL
NEUTROPHILS # BLD AUTO: 8.3 K/UL
NEUTROPHILS NFR BLD AUTO: 70.6 %
PLATELET # BLD AUTO: 171 K/UL
POTASSIUM SERPL-SCNC: 4 MMOL/L
PROT SERPL-MCNC: 6.2 G/DL
RBC # BLD: 3.98 M/UL
RBC # FLD: 18 %
SODIUM SERPL-SCNC: 143 MMOL/L
WBC # FLD AUTO: 11.7 K/UL

## 2024-12-04 PROCEDURE — 96417 CHEMO IV INFUS EACH ADDL SEQ: CPT

## 2024-12-04 PROCEDURE — 96413 CHEMO IV INFUSION 1 HR: CPT

## 2024-12-04 PROCEDURE — 96411 CHEMO IV PUSH ADDL DRUG: CPT

## 2024-12-04 PROCEDURE — 96416 CHEMO PROLONG INFUSE W/PUMP: CPT

## 2024-12-04 PROCEDURE — 96375 TX/PRO/DX INJ NEW DRUG ADDON: CPT

## 2024-12-04 PROCEDURE — 99214 OFFICE O/P EST MOD 30 MIN: CPT | Mod: 25

## 2024-12-04 PROCEDURE — 96367 TX/PROPH/DG ADDL SEQ IV INF: CPT

## 2024-12-04 RX ORDER — FLUOROURACIL 50 MG/ML
740 VIAL (ML) INTRAVENOUS ONCE
Refills: 0 | Status: COMPLETED | OUTPATIENT
Start: 2024-12-04 | End: 2024-12-04

## 2024-12-04 RX ORDER — FOSAPREPITANT 150 MG/5ML
150 INJECTION, POWDER, LYOPHILIZED, FOR SOLUTION INTRAVENOUS ONCE
Refills: 0 | Status: COMPLETED | OUTPATIENT
Start: 2024-12-04 | End: 2024-12-04

## 2024-12-04 RX ORDER — PALONOSETRON HYDROCHLORIDE 0.25 MG/5ML
0.25 INJECTION INTRAVENOUS ONCE
Refills: 0 | Status: COMPLETED | OUTPATIENT
Start: 2024-12-04 | End: 2024-12-04

## 2024-12-04 RX ORDER — ATROPINE 2 MG/.7ML
0.4 INJECTION INTRAMUSCULAR ONCE
Refills: 0 | Status: COMPLETED | OUTPATIENT
Start: 2024-12-04 | End: 2024-12-04

## 2024-12-04 RX ORDER — LEUCOVORIN CALCIUM 15 MG/1
740 TABLET ORAL ONCE
Refills: 0 | Status: COMPLETED | OUTPATIENT
Start: 2024-12-04 | End: 2024-12-04

## 2024-12-04 RX ORDER — IRINOTECAN HYDROCHLORIDE 20 MG/ML
280 INJECTION, SOLUTION INTRAVENOUS ONCE
Refills: 0 | Status: COMPLETED | OUTPATIENT
Start: 2024-12-04 | End: 2024-12-04

## 2024-12-04 RX ORDER — PANITUMUMAB 200 MG/10ML
445 SOLUTION INTRAVENOUS ONCE
Refills: 0 | Status: COMPLETED | OUTPATIENT
Start: 2024-12-04 | End: 2024-12-04

## 2024-12-04 RX ORDER — DEXAMETHASONE 1.5 MG/1
10 TABLET ORAL ONCE
Refills: 0 | Status: COMPLETED | OUTPATIENT
Start: 2024-12-04 | End: 2024-12-04

## 2024-12-04 RX ORDER — LIDOCAINE 40 MG/G
1 CREAM TOPICAL ONCE
Refills: 0 | Status: COMPLETED | OUTPATIENT
Start: 2024-12-04 | End: 2024-12-04

## 2024-12-04 RX ORDER — FLUOROURACIL 50 MG/ML
4460 VIAL (ML) INTRAVENOUS ONCE
Refills: 0 | Status: COMPLETED | OUTPATIENT
Start: 2024-12-04 | End: 2024-12-04

## 2024-12-04 RX ADMIN — FOSAPREPITANT 500 MILLIGRAM(S): 150 INJECTION, POWDER, LYOPHILIZED, FOR SOLUTION INTRAVENOUS at 15:15

## 2024-12-04 RX ADMIN — IRINOTECAN HYDROCHLORIDE 280 MILLIGRAM(S): 20 INJECTION, SOLUTION INTRAVENOUS at 15:50

## 2024-12-04 RX ADMIN — DEXAMETHASONE 10 MILLIGRAM(S): 1.5 TABLET ORAL at 15:10

## 2024-12-04 RX ADMIN — PALONOSETRON HYDROCHLORIDE 0.25 MILLIGRAM(S): 0.25 INJECTION INTRAVENOUS at 15:10

## 2024-12-04 RX ADMIN — IRINOTECAN HYDROCHLORIDE 280 MILLIGRAM(S): 20 INJECTION, SOLUTION INTRAVENOUS at 17:20

## 2024-12-04 RX ADMIN — LEUCOVORIN CALCIUM 740 MILLIGRAM(S): 15 TABLET ORAL at 17:20

## 2024-12-04 RX ADMIN — LEUCOVORIN CALCIUM 740 MILLIGRAM(S): 15 TABLET ORAL at 15:50

## 2024-12-04 RX ADMIN — DEXAMETHASONE 204 MILLIGRAM(S): 1.5 TABLET ORAL at 14:55

## 2024-12-04 RX ADMIN — PANITUMUMAB 445 MILLIGRAM(S): 200 SOLUTION INTRAVENOUS at 14:25

## 2024-12-04 RX ADMIN — Medication 4460 MILLIGRAM(S): at 17:40

## 2024-12-04 RX ADMIN — ATROPINE 0.4 MILLIGRAM(S): 2 INJECTION INTRAMUSCULAR at 15:40

## 2024-12-04 RX ADMIN — Medication 177.6 MILLIGRAM(S): at 17:29

## 2024-12-04 RX ADMIN — PANITUMUMAB 445 MILLIGRAM(S): 200 SOLUTION INTRAVENOUS at 14:55

## 2024-12-04 RX ADMIN — FOSAPREPITANT 150 MILLIGRAM(S): 150 INJECTION, POWDER, LYOPHILIZED, FOR SOLUTION INTRAVENOUS at 15:45

## 2024-12-06 ENCOUNTER — OUTPATIENT (OUTPATIENT)
Dept: OUTPATIENT SERVICES | Facility: HOSPITAL | Age: 59
LOS: 1 days | End: 2024-12-06
Payer: COMMERCIAL

## 2024-12-06 ENCOUNTER — APPOINTMENT (OUTPATIENT)
Dept: INFUSION THERAPY | Facility: CLINIC | Age: 59
End: 2024-12-06

## 2024-12-06 VITALS
RESPIRATION RATE: 18 BRPM | SYSTOLIC BLOOD PRESSURE: 101 MMHG | OXYGEN SATURATION: 98 % | TEMPERATURE: 98 F | WEIGHT: 166.89 LBS | HEART RATE: 80 BPM | DIASTOLIC BLOOD PRESSURE: 71 MMHG | HEIGHT: 67 IN

## 2024-12-06 DIAGNOSIS — Z90.79 ACQUIRED ABSENCE OF OTHER GENITAL ORGAN(S): Chronic | ICD-10-CM

## 2024-12-06 DIAGNOSIS — Z90.49 ACQUIRED ABSENCE OF OTHER SPECIFIED PARTS OF DIGESTIVE TRACT: Chronic | ICD-10-CM

## 2024-12-06 DIAGNOSIS — Z98.890 OTHER SPECIFIED POSTPROCEDURAL STATES: Chronic | ICD-10-CM

## 2024-12-06 DIAGNOSIS — Z90.89 ACQUIRED ABSENCE OF OTHER ORGANS: Chronic | ICD-10-CM

## 2024-12-06 LAB — CEA SERPL-MCNC: 5.5 NG/ML

## 2024-12-06 PROCEDURE — 96375 TX/PRO/DX INJ NEW DRUG ADDON: CPT

## 2024-12-06 RX ORDER — PEGFILGRASTIM-CBQV 6 MG/.6ML
6 INJECTION, SOLUTION SUBCUTANEOUS ONCE
Refills: 0 | Status: COMPLETED | OUTPATIENT
Start: 2024-12-06 | End: 2024-12-06

## 2024-12-06 RX ORDER — SODIUM CHLORIDE 9 MG/ML
10 INJECTION, SOLUTION INTRAMUSCULAR; INTRAVENOUS; SUBCUTANEOUS ONCE
Refills: 0 | Status: COMPLETED | OUTPATIENT
Start: 2024-12-06 | End: 2024-12-06

## 2024-12-06 RX ADMIN — SODIUM CHLORIDE 10 MILLILITER(S): 9 INJECTION, SOLUTION INTRAMUSCULAR; INTRAVENOUS; SUBCUTANEOUS at 16:45

## 2024-12-06 RX ADMIN — PEGFILGRASTIM-CBQV 6 MILLIGRAM(S): 6 INJECTION, SOLUTION SUBCUTANEOUS at 16:50

## 2024-12-09 DIAGNOSIS — C18.9 MALIGNANT NEOPLASM OF COLON, UNSPECIFIED: ICD-10-CM

## 2024-12-18 ENCOUNTER — APPOINTMENT (OUTPATIENT)
Dept: INFUSION THERAPY | Facility: CLINIC | Age: 59
End: 2024-12-18

## 2024-12-18 ENCOUNTER — APPOINTMENT (OUTPATIENT)
Dept: PALLIATIVE MEDICINE | Facility: CLINIC | Age: 59
End: 2024-12-18
Payer: MEDICAID

## 2024-12-18 ENCOUNTER — OUTPATIENT (OUTPATIENT)
Dept: OUTPATIENT SERVICES | Facility: HOSPITAL | Age: 59
LOS: 1 days | End: 2024-12-18
Payer: COMMERCIAL

## 2024-12-18 VITALS
WEIGHT: 164.91 LBS | RESPIRATION RATE: 18 BRPM | HEART RATE: 90 BPM | OXYGEN SATURATION: 96 % | SYSTOLIC BLOOD PRESSURE: 107 MMHG | DIASTOLIC BLOOD PRESSURE: 77 MMHG | HEIGHT: 67 IN | TEMPERATURE: 98 F

## 2024-12-18 DIAGNOSIS — C18.9 MALIGNANT NEOPLASM OF COLON, UNSPECIFIED: ICD-10-CM

## 2024-12-18 DIAGNOSIS — Z98.890 OTHER SPECIFIED POSTPROCEDURAL STATES: Chronic | ICD-10-CM

## 2024-12-18 DIAGNOSIS — F41.9 ANXIETY DISORDER, UNSPECIFIED: ICD-10-CM

## 2024-12-18 DIAGNOSIS — Z51.5 ENCOUNTER FOR PALLIATIVE CARE: ICD-10-CM

## 2024-12-18 DIAGNOSIS — Z90.49 ACQUIRED ABSENCE OF OTHER SPECIFIED PARTS OF DIGESTIVE TRACT: Chronic | ICD-10-CM

## 2024-12-18 DIAGNOSIS — Z90.79 ACQUIRED ABSENCE OF OTHER GENITAL ORGAN(S): Chronic | ICD-10-CM

## 2024-12-18 DIAGNOSIS — R63.0 ANOREXIA: ICD-10-CM

## 2024-12-18 DIAGNOSIS — Z90.89 ACQUIRED ABSENCE OF OTHER ORGANS: Chronic | ICD-10-CM

## 2024-12-18 DIAGNOSIS — C78.7 MALIGNANT NEOPLASM OF COLON, UNSPECIFIED: ICD-10-CM

## 2024-12-18 PROBLEM — E87.6 HYPOKALEMIA: Status: ACTIVE | Noted: 2024-12-18

## 2024-12-18 LAB
ALBUMIN SERPL ELPH-MCNC: 3.8 G/DL — SIGNIFICANT CHANGE UP (ref 3.3–5)
ALP SERPL-CCNC: 163 U/L — HIGH (ref 40–120)
ALT FLD-CCNC: 29 U/L — SIGNIFICANT CHANGE UP (ref 10–45)
ANION GAP SERPL CALC-SCNC: 3 MMOL/L — LOW (ref 5–17)
AST SERPL-CCNC: 34 U/L — SIGNIFICANT CHANGE UP (ref 10–40)
BASOPHILS # BLD AUTO: 0.18 K/UL — SIGNIFICANT CHANGE UP (ref 0–0.2)
BASOPHILS NFR BLD AUTO: 0.9 % — SIGNIFICANT CHANGE UP (ref 0–2)
BILIRUB SERPL-MCNC: 0.7 MG/DL — SIGNIFICANT CHANGE UP (ref 0.2–1.2)
BUN SERPL-MCNC: 8 MG/DL — SIGNIFICANT CHANGE UP (ref 7–23)
CALCIUM SERPL-MCNC: 9.1 MG/DL — SIGNIFICANT CHANGE UP (ref 8.4–10.5)
CHLORIDE SERPL-SCNC: 113 MMOL/L — HIGH (ref 96–108)
CO2 SERPL-SCNC: 26 MMOL/L — SIGNIFICANT CHANGE UP (ref 22–31)
CREAT SERPL-MCNC: 0.5 MG/DL — SIGNIFICANT CHANGE UP (ref 0.5–1.3)
EGFR: 109 ML/MIN/1.73M2 — SIGNIFICANT CHANGE UP
EOSINOPHIL # BLD AUTO: 0 K/UL — SIGNIFICANT CHANGE UP (ref 0–0.5)
EOSINOPHIL NFR BLD AUTO: 0 % — SIGNIFICANT CHANGE UP (ref 0–6)
GLUCOSE SERPL-MCNC: 106 MG/DL — HIGH (ref 70–99)
HCT VFR BLD CALC: 42 % — SIGNIFICANT CHANGE UP (ref 34.5–45)
HGB BLD-MCNC: 13.3 G/DL — SIGNIFICANT CHANGE UP (ref 11.5–15.5)
LYMPHOCYTES # BLD AUTO: 14.1 % — SIGNIFICANT CHANGE UP (ref 13–44)
LYMPHOCYTES # BLD AUTO: 2.76 K/UL — SIGNIFICANT CHANGE UP (ref 1–3.3)
MCHC RBC-ENTMCNC: 31.1 PG — SIGNIFICANT CHANGE UP (ref 27–34)
MCHC RBC-ENTMCNC: 31.7 G/DL — LOW (ref 32–36)
MCV RBC AUTO: 98.1 FL — SIGNIFICANT CHANGE UP (ref 80–100)
MONOCYTES # BLD AUTO: 1.39 K/UL — HIGH (ref 0–0.9)
MONOCYTES NFR BLD AUTO: 7.1 % — SIGNIFICANT CHANGE UP (ref 2–14)
NEUTROPHILS # BLD AUTO: 15.24 K/UL — HIGH (ref 1.8–7.4)
NEUTROPHILS NFR BLD AUTO: 77 % — SIGNIFICANT CHANGE UP (ref 43–77)
PLATELET # BLD AUTO: 182 K/UL — SIGNIFICANT CHANGE UP (ref 150–400)
POTASSIUM SERPL-MCNC: 3.3 MMOL/L — LOW (ref 3.5–5.3)
POTASSIUM SERPL-SCNC: 3.3 MMOL/L — LOW (ref 3.5–5.3)
PROT SERPL-MCNC: 6.7 G/DL — SIGNIFICANT CHANGE UP (ref 6–8.3)
RBC # BLD: 4.28 M/UL — SIGNIFICANT CHANGE UP (ref 3.8–5.2)
RBC # FLD: 16.4 % — HIGH (ref 10.3–14.5)
SODIUM SERPL-SCNC: 142 MMOL/L — SIGNIFICANT CHANGE UP (ref 135–145)
WBC # BLD: 19.56 K/UL — HIGH (ref 3.8–10.5)
WBC # FLD AUTO: 19.56 K/UL — HIGH (ref 3.8–10.5)

## 2024-12-18 PROCEDURE — 96367 TX/PROPH/DG ADDL SEQ IV INF: CPT

## 2024-12-18 PROCEDURE — 96413 CHEMO IV INFUSION 1 HR: CPT

## 2024-12-18 PROCEDURE — 85025 COMPLETE CBC W/AUTO DIFF WBC: CPT

## 2024-12-18 PROCEDURE — 96411 CHEMO IV PUSH ADDL DRUG: CPT

## 2024-12-18 PROCEDURE — 99214 OFFICE O/P EST MOD 30 MIN: CPT

## 2024-12-18 PROCEDURE — 36415 COLL VENOUS BLD VENIPUNCTURE: CPT

## 2024-12-18 PROCEDURE — 96416 CHEMO PROLONG INFUSE W/PUMP: CPT

## 2024-12-18 PROCEDURE — 80053 COMPREHEN METABOLIC PANEL: CPT

## 2024-12-18 PROCEDURE — 96375 TX/PRO/DX INJ NEW DRUG ADDON: CPT

## 2024-12-18 PROCEDURE — 96417 CHEMO IV INFUS EACH ADDL SEQ: CPT

## 2024-12-18 RX ORDER — ATROPINE 2 MG/.7ML
0.4 INJECTION INTRAMUSCULAR ONCE
Refills: 0 | Status: COMPLETED | OUTPATIENT
Start: 2024-12-18 | End: 2024-12-18

## 2024-12-18 RX ORDER — PANITUMUMAB 200 MG/10ML
445 SOLUTION INTRAVENOUS ONCE
Refills: 0 | Status: COMPLETED | OUTPATIENT
Start: 2024-12-18 | End: 2024-12-18

## 2024-12-18 RX ORDER — LORAZEPAM 0.5 MG/1
0.5 TABLET ORAL
Qty: 15 | Refills: 0 | Status: ACTIVE | COMMUNITY
Start: 2024-12-18 | End: 1900-01-01

## 2024-12-18 RX ORDER — FLUOROURACIL 50 MG/ML
4460 VIAL (ML) INTRAVENOUS ONCE
Refills: 0 | Status: COMPLETED | OUTPATIENT
Start: 2024-12-18 | End: 2024-12-18

## 2024-12-18 RX ORDER — LIDOCAINE 40 MG/G
1 CREAM TOPICAL ONCE
Refills: 0 | Status: COMPLETED | OUTPATIENT
Start: 2024-12-18 | End: 2024-12-18

## 2024-12-18 RX ORDER — DEXAMETHASONE 1.5 MG/1
10 TABLET ORAL ONCE
Refills: 0 | Status: COMPLETED | OUTPATIENT
Start: 2024-12-18 | End: 2024-12-18

## 2024-12-18 RX ORDER — LEUCOVORIN CALCIUM 15 MG/1
740 TABLET ORAL ONCE
Refills: 0 | Status: COMPLETED | OUTPATIENT
Start: 2024-12-18 | End: 2024-12-18

## 2024-12-18 RX ORDER — FOSAPREPITANT 150 MG/5ML
150 INJECTION, POWDER, LYOPHILIZED, FOR SOLUTION INTRAVENOUS ONCE
Refills: 0 | Status: COMPLETED | OUTPATIENT
Start: 2024-12-18 | End: 2024-12-18

## 2024-12-18 RX ORDER — PALONOSETRON HYDROCHLORIDE 0.25 MG/5ML
0.25 INJECTION INTRAVENOUS ONCE
Refills: 0 | Status: COMPLETED | OUTPATIENT
Start: 2024-12-18 | End: 2024-12-18

## 2024-12-18 RX ORDER — IRINOTECAN HYDROCHLORIDE 20 MG/ML
280 INJECTION, SOLUTION INTRAVENOUS ONCE
Refills: 0 | Status: COMPLETED | OUTPATIENT
Start: 2024-12-18 | End: 2024-12-18

## 2024-12-18 RX ORDER — FLUOROURACIL 50 MG/ML
740 VIAL (ML) INTRAVENOUS ONCE
Refills: 0 | Status: COMPLETED | OUTPATIENT
Start: 2024-12-18 | End: 2024-12-18

## 2024-12-18 RX ADMIN — DEXAMETHASONE 204 MILLIGRAM(S): 1.5 TABLET ORAL at 16:14

## 2024-12-18 RX ADMIN — IRINOTECAN HYDROCHLORIDE 280 MILLIGRAM(S): 20 INJECTION, SOLUTION INTRAVENOUS at 17:00

## 2024-12-18 RX ADMIN — PALONOSETRON HYDROCHLORIDE 0.25 MILLIGRAM(S): 0.25 INJECTION INTRAVENOUS at 16:14

## 2024-12-18 RX ADMIN — Medication 4460 MILLIGRAM(S): at 18:37

## 2024-12-18 RX ADMIN — FOSAPREPITANT 500 MILLIGRAM(S): 150 INJECTION, POWDER, LYOPHILIZED, FOR SOLUTION INTRAVENOUS at 16:30

## 2024-12-18 RX ADMIN — PANITUMUMAB 445 MILLIGRAM(S): 200 SOLUTION INTRAVENOUS at 15:37

## 2024-12-18 RX ADMIN — DEXAMETHASONE 10 MILLIGRAM(S): 1.5 TABLET ORAL at 16:29

## 2024-12-18 RX ADMIN — FOSAPREPITANT 150 MILLIGRAM(S): 150 INJECTION, POWDER, LYOPHILIZED, FOR SOLUTION INTRAVENOUS at 17:00

## 2024-12-18 RX ADMIN — LEUCOVORIN CALCIUM 740 MILLIGRAM(S): 15 TABLET ORAL at 18:30

## 2024-12-18 RX ADMIN — Medication 177.6 MILLIGRAM(S): at 18:32

## 2024-12-18 RX ADMIN — PANITUMUMAB 445 MILLIGRAM(S): 200 SOLUTION INTRAVENOUS at 16:07

## 2024-12-18 RX ADMIN — IRINOTECAN HYDROCHLORIDE 280 MILLIGRAM(S): 20 INJECTION, SOLUTION INTRAVENOUS at 18:30

## 2024-12-18 RX ADMIN — ATROPINE 0.4 MILLIGRAM(S): 2 INJECTION INTRAMUSCULAR at 16:47

## 2024-12-18 RX ADMIN — LEUCOVORIN CALCIUM 740 MILLIGRAM(S): 15 TABLET ORAL at 17:00

## 2024-12-18 NOTE — PHARMACY COMMUNICATION NOTE - COMMENTS
As per correspondence with Ayad Franklin in the Med-Onc Infusion teams chat, "WBC and ANC came back elevated today (WBC = 19.56, ANC = 15.24), just want to confirm if she is cleared for FOLFIRI/panitumumab today?" As per Ayad Franklin, "Yes, she is cleared for chemo today." Thanks

## 2024-12-20 ENCOUNTER — OUTPATIENT (OUTPATIENT)
Dept: OUTPATIENT SERVICES | Facility: HOSPITAL | Age: 59
LOS: 1 days | End: 2024-12-20
Payer: COMMERCIAL

## 2024-12-20 ENCOUNTER — APPOINTMENT (OUTPATIENT)
Dept: INFUSION THERAPY | Facility: CLINIC | Age: 59
End: 2024-12-20

## 2024-12-20 VITALS
OXYGEN SATURATION: 98 % | DIASTOLIC BLOOD PRESSURE: 74 MMHG | HEIGHT: 67 IN | SYSTOLIC BLOOD PRESSURE: 112 MMHG | TEMPERATURE: 98 F | WEIGHT: 164.91 LBS | RESPIRATION RATE: 18 BRPM | HEART RATE: 78 BPM

## 2024-12-20 DIAGNOSIS — Z98.890 OTHER SPECIFIED POSTPROCEDURAL STATES: Chronic | ICD-10-CM

## 2024-12-20 DIAGNOSIS — Z90.79 ACQUIRED ABSENCE OF OTHER GENITAL ORGAN(S): Chronic | ICD-10-CM

## 2024-12-20 DIAGNOSIS — Z90.49 ACQUIRED ABSENCE OF OTHER SPECIFIED PARTS OF DIGESTIVE TRACT: Chronic | ICD-10-CM

## 2024-12-20 DIAGNOSIS — C18.9 MALIGNANT NEOPLASM OF COLON, UNSPECIFIED: ICD-10-CM

## 2024-12-20 DIAGNOSIS — Z90.89 ACQUIRED ABSENCE OF OTHER ORGANS: Chronic | ICD-10-CM

## 2024-12-20 PROCEDURE — 96372 THER/PROPH/DIAG INJ SC/IM: CPT

## 2024-12-20 RX ORDER — SODIUM CHLORIDE 9 MG/ML
10 INJECTION, SOLUTION INTRAMUSCULAR; INTRAVENOUS; SUBCUTANEOUS ONCE
Refills: 0 | Status: COMPLETED | OUTPATIENT
Start: 2024-12-20 | End: 2024-12-20

## 2024-12-20 RX ORDER — PEGFILGRASTIM-CBQV 6 MG/.6ML
6 INJECTION, SOLUTION SUBCUTANEOUS ONCE
Refills: 0 | Status: COMPLETED | OUTPATIENT
Start: 2024-12-20 | End: 2024-12-20

## 2024-12-20 RX ADMIN — PEGFILGRASTIM-CBQV 6 MILLIGRAM(S): 6 INJECTION, SOLUTION SUBCUTANEOUS at 17:15

## 2024-12-20 RX ADMIN — SODIUM CHLORIDE 10 MILLILITER(S): 9 INJECTION, SOLUTION INTRAMUSCULAR; INTRAVENOUS; SUBCUTANEOUS at 17:10

## 2025-01-08 ENCOUNTER — APPOINTMENT (OUTPATIENT)
Dept: HEMATOLOGY ONCOLOGY | Facility: CLINIC | Age: 60
End: 2025-01-08

## 2025-01-08 ENCOUNTER — NON-APPOINTMENT (OUTPATIENT)
Age: 60
End: 2025-01-08

## 2025-01-08 ENCOUNTER — OUTPATIENT (OUTPATIENT)
Dept: OUTPATIENT SERVICES | Facility: HOSPITAL | Age: 60
LOS: 1 days | End: 2025-01-08
Payer: COMMERCIAL

## 2025-01-08 ENCOUNTER — APPOINTMENT (OUTPATIENT)
Dept: INFUSION THERAPY | Facility: CLINIC | Age: 60
End: 2025-01-08

## 2025-01-08 VITALS
SYSTOLIC BLOOD PRESSURE: 113 MMHG | DIASTOLIC BLOOD PRESSURE: 76 MMHG | OXYGEN SATURATION: 97 % | HEART RATE: 102 BPM | RESPIRATION RATE: 16 BRPM | SYSTOLIC BLOOD PRESSURE: 113 MMHG | OXYGEN SATURATION: 97 % | TEMPERATURE: 97 F | WEIGHT: 162.13 LBS | HEART RATE: 102 BPM | TEMPERATURE: 97.2 F | HEIGHT: 67 IN | DIASTOLIC BLOOD PRESSURE: 76 MMHG | HEIGHT: 67 IN | BODY MASS INDEX: 25.45 KG/M2 | WEIGHT: 162.04 LBS | RESPIRATION RATE: 18 BRPM

## 2025-01-08 VITALS
DIASTOLIC BLOOD PRESSURE: 79 MMHG | TEMPERATURE: 98 F | OXYGEN SATURATION: 95 % | RESPIRATION RATE: 16 BRPM | SYSTOLIC BLOOD PRESSURE: 124 MMHG | HEART RATE: 93 BPM

## 2025-01-08 DIAGNOSIS — C18.9 MALIGNANT NEOPLASM OF COLON, UNSPECIFIED: ICD-10-CM

## 2025-01-08 DIAGNOSIS — L27.0 GENERALIZED SKIN ERUPTION DUE TO DRUGS AND MEDICAMENTS TAKEN INTERNALLY: ICD-10-CM

## 2025-01-08 DIAGNOSIS — Z90.49 ACQUIRED ABSENCE OF OTHER SPECIFIED PARTS OF DIGESTIVE TRACT: Chronic | ICD-10-CM

## 2025-01-08 DIAGNOSIS — Z98.890 OTHER SPECIFIED POSTPROCEDURAL STATES: Chronic | ICD-10-CM

## 2025-01-08 DIAGNOSIS — C78.6 SECONDARY MALIGNANT NEOPLASM OF RETROPERITONEUM AND PERITONEUM: ICD-10-CM

## 2025-01-08 DIAGNOSIS — C78.00 SECONDARY MALIGNANT NEOPLASM OF UNSPECIFIED LUNG: ICD-10-CM

## 2025-01-08 DIAGNOSIS — Z90.89 ACQUIRED ABSENCE OF OTHER ORGANS: Chronic | ICD-10-CM

## 2025-01-08 DIAGNOSIS — Z90.79 ACQUIRED ABSENCE OF OTHER GENITAL ORGAN(S): Chronic | ICD-10-CM

## 2025-01-08 DIAGNOSIS — C78.7 MALIGNANT NEOPLASM OF COLON, UNSPECIFIED: ICD-10-CM

## 2025-01-08 LAB
ALBUMIN SERPL ELPH-MCNC: 3.6 G/DL
ALP BLD-CCNC: 150 U/L
ALT SERPL-CCNC: 45 U/L
ANION GAP SERPL CALC-SCNC: -1 MMOL/L
AST SERPL-CCNC: 47 U/L
BILIRUB SERPL-MCNC: 0.6 MG/DL
BUN SERPL-MCNC: 11 MG/DL
CALCIUM SERPL-MCNC: 9.8 MG/DL
CHLORIDE SERPL-SCNC: 117 MMOL/L
CO2 SERPL-SCNC: 24 MMOL/L
CREAT SERPL-MCNC: 0.6 MG/DL
EGFR: 105 ML/MIN/1.73M2
GLUCOSE SERPL-MCNC: 104 MG/DL
HCT VFR BLD CALC: 42.4 %
HGB BLD-MCNC: 14 G/DL
LYMPHOCYTES # BLD AUTO: 2.4 K/UL
LYMPHOCYTES NFR BLD AUTO: 20.5 %
MAGNESIUM SERPL-MCNC: 1.4 MG/DL
MAN DIFF?: NO
MCHC RBC-ENTMCNC: 31.6 PG
MCHC RBC-ENTMCNC: 33 G/DL
MCV RBC AUTO: 95.7 FL
NEUTROPHILS # BLD AUTO: 8.4 K/UL
NEUTROPHILS NFR BLD AUTO: 69.9 %
PLATELET # BLD AUTO: 163 K/UL
POTASSIUM SERPL-SCNC: 4.4 MMOL/L
PROT SERPL-MCNC: 6.8 G/DL
RBC # BLD: 4.43 M/UL
RBC # FLD: 16.5 %
SODIUM SERPL-SCNC: 140 MMOL/L
WBC # FLD AUTO: 11.9 K/UL

## 2025-01-08 PROCEDURE — 99215 OFFICE O/P EST HI 40 MIN: CPT | Mod: 25

## 2025-01-08 PROCEDURE — 96416 CHEMO PROLONG INFUSE W/PUMP: CPT

## 2025-01-08 PROCEDURE — 96411 CHEMO IV PUSH ADDL DRUG: CPT

## 2025-01-08 PROCEDURE — 96375 TX/PRO/DX INJ NEW DRUG ADDON: CPT

## 2025-01-08 PROCEDURE — 96413 CHEMO IV INFUSION 1 HR: CPT

## 2025-01-08 PROCEDURE — 96367 TX/PROPH/DG ADDL SEQ IV INF: CPT

## 2025-01-08 RX ORDER — FLUOROURACIL 50 MG/ML
740 VIAL (ML) INTRAVENOUS ONCE
Refills: 0 | Status: COMPLETED | OUTPATIENT
Start: 2025-01-08 | End: 2025-01-08

## 2025-01-08 RX ORDER — HYDROCORTISONE 25 MG/ML
2.5 LOTION TOPICAL 3 TIMES DAILY
Qty: 1 | Refills: 2 | Status: ACTIVE | COMMUNITY
Start: 2025-01-08 | End: 1900-01-01

## 2025-01-08 RX ORDER — FOSAPREPITANT DIMEGLUMINE 150 MG/5ML
150 INJECTION, POWDER, LYOPHILIZED, FOR SOLUTION INTRAVENOUS ONCE
Refills: 0 | Status: COMPLETED | OUTPATIENT
Start: 2025-01-08 | End: 2025-01-08

## 2025-01-08 RX ORDER — DIPHENOXYLATE HYDROCHLORIDE AND ATROPINE SULFATE 2.5; .025 MG/1; MG/1
2.5-0.025 TABLET ORAL 4 TIMES DAILY
Qty: 20 | Refills: 0 | Status: ACTIVE | COMMUNITY
Start: 2025-01-08 | End: 1900-01-01

## 2025-01-08 RX ORDER — ATROPINE SULFATE 0.4 MG/ML
0.4 VIAL (ML) INJECTION ONCE
Refills: 0 | Status: COMPLETED | OUTPATIENT
Start: 2025-01-08 | End: 2025-01-08

## 2025-01-08 RX ORDER — FLUOROURACIL 50 MG/ML
4460 VIAL (ML) INTRAVENOUS ONCE
Refills: 0 | Status: COMPLETED | OUTPATIENT
Start: 2025-01-08 | End: 2025-01-08

## 2025-01-08 RX ORDER — IRINOTECAN HYDROCHLORIDE 20 MG/ML
280 INJECTION, SOLUTION INTRAVENOUS ONCE
Refills: 0 | Status: COMPLETED | OUTPATIENT
Start: 2025-01-08 | End: 2025-01-08

## 2025-01-08 RX ORDER — DEXAMETHASONE SODIUM PHOSPHATE 4 MG/ML
10 VIAL (ML) INJECTION ONCE
Refills: 0 | Status: COMPLETED | OUTPATIENT
Start: 2025-01-08 | End: 2025-01-08

## 2025-01-08 RX ORDER — LEUCOVORIN CALCIUM 10 MG/ML
740 INJECTION INTRAMUSCULAR; INTRAVENOUS ONCE
Refills: 0 | Status: COMPLETED | OUTPATIENT
Start: 2025-01-08 | End: 2025-01-08

## 2025-01-08 RX ORDER — LIDOCAINE 50 MG/G
1 OINTMENT TOPICAL ONCE
Refills: 0 | Status: COMPLETED | OUTPATIENT
Start: 2025-01-08 | End: 2025-01-08

## 2025-01-08 RX ORDER — PALONOSETRON HYDROCHLORIDE 0.25 MG/5ML
0.25 INJECTION INTRAVENOUS ONCE
Refills: 0 | Status: COMPLETED | OUTPATIENT
Start: 2025-01-08 | End: 2025-01-08

## 2025-01-08 RX ADMIN — IRINOTECAN HYDROCHLORIDE 280 MILLIGRAM(S): 20 INJECTION, SOLUTION INTRAVENOUS at 15:38

## 2025-01-08 RX ADMIN — Medication 10 MILLIGRAM(S): at 14:42

## 2025-01-08 RX ADMIN — PALONOSETRON HYDROCHLORIDE 0.25 MILLIGRAM(S): 0.25 INJECTION INTRAVENOUS at 14:27

## 2025-01-08 RX ADMIN — IRINOTECAN HYDROCHLORIDE 280 MILLIGRAM(S): 20 INJECTION, SOLUTION INTRAVENOUS at 17:18

## 2025-01-08 RX ADMIN — FOSAPREPITANT DIMEGLUMINE 500 MILLIGRAM(S): 150 INJECTION, POWDER, LYOPHILIZED, FOR SOLUTION INTRAVENOUS at 14:45

## 2025-01-08 RX ADMIN — Medication 204 MILLIGRAM(S): at 14:27

## 2025-01-08 RX ADMIN — LEUCOVORIN CALCIUM 740 MILLIGRAM(S): 10 INJECTION INTRAMUSCULAR; INTRAVENOUS at 17:18

## 2025-01-08 RX ADMIN — FOSAPREPITANT DIMEGLUMINE 150 MILLIGRAM(S): 150 INJECTION, POWDER, LYOPHILIZED, FOR SOLUTION INTRAVENOUS at 15:15

## 2025-01-08 RX ADMIN — LEUCOVORIN CALCIUM 740 MILLIGRAM(S): 10 INJECTION INTRAMUSCULAR; INTRAVENOUS at 15:38

## 2025-01-08 RX ADMIN — Medication 0.4 MILLIGRAM(S): at 15:01

## 2025-01-08 RX ADMIN — Medication 177.6 MILLIGRAM(S): at 17:35

## 2025-01-08 RX ADMIN — Medication 4460 MILLIGRAM(S): at 17:36

## 2025-01-09 LAB — CEA SERPL-MCNC: 4.4 NG/ML

## 2025-01-10 ENCOUNTER — OUTPATIENT (OUTPATIENT)
Dept: OUTPATIENT SERVICES | Facility: HOSPITAL | Age: 60
LOS: 1 days | End: 2025-01-10
Payer: COMMERCIAL

## 2025-01-10 ENCOUNTER — APPOINTMENT (OUTPATIENT)
Dept: INFUSION THERAPY | Facility: CLINIC | Age: 60
End: 2025-01-10

## 2025-01-10 VITALS
RESPIRATION RATE: 15 BRPM | SYSTOLIC BLOOD PRESSURE: 122 MMHG | HEART RATE: 86 BPM | DIASTOLIC BLOOD PRESSURE: 78 MMHG | OXYGEN SATURATION: 97 % | TEMPERATURE: 98 F

## 2025-01-10 DIAGNOSIS — Z90.89 ACQUIRED ABSENCE OF OTHER ORGANS: Chronic | ICD-10-CM

## 2025-01-10 DIAGNOSIS — Z98.890 OTHER SPECIFIED POSTPROCEDURAL STATES: Chronic | ICD-10-CM

## 2025-01-10 DIAGNOSIS — C18.9 MALIGNANT NEOPLASM OF COLON, UNSPECIFIED: ICD-10-CM

## 2025-01-10 DIAGNOSIS — Z90.79 ACQUIRED ABSENCE OF OTHER GENITAL ORGAN(S): Chronic | ICD-10-CM

## 2025-01-10 DIAGNOSIS — Z90.49 ACQUIRED ABSENCE OF OTHER SPECIFIED PARTS OF DIGESTIVE TRACT: Chronic | ICD-10-CM

## 2025-01-10 DIAGNOSIS — D45 POLYCYTHEMIA VERA: ICD-10-CM

## 2025-01-10 PROCEDURE — 96372 THER/PROPH/DIAG INJ SC/IM: CPT

## 2025-01-10 RX ORDER — SODIUM CHLORIDE 9 MG/ML
10 INJECTION, SOLUTION INTRAMUSCULAR; INTRAVENOUS; SUBCUTANEOUS ONCE
Refills: 0 | Status: COMPLETED | OUTPATIENT
Start: 2025-01-10 | End: 2025-01-10

## 2025-01-10 RX ORDER — PEGFILGRASTIM-APGF 6 MG/.6ML
6 INJECTION, SOLUTION SUBCUTANEOUS ONCE
Refills: 0 | Status: COMPLETED | OUTPATIENT
Start: 2025-01-10 | End: 2025-01-10

## 2025-01-10 RX ADMIN — PEGFILGRASTIM-APGF 6 MILLIGRAM(S): 6 INJECTION, SOLUTION SUBCUTANEOUS at 17:00

## 2025-01-10 RX ADMIN — Medication 4460 MILLIGRAM(S): at 16:54

## 2025-01-10 RX ADMIN — SODIUM CHLORIDE 10 MILLILITER(S): 9 INJECTION, SOLUTION INTRAMUSCULAR; INTRAVENOUS; SUBCUTANEOUS at 16:55

## 2025-01-22 ENCOUNTER — APPOINTMENT (OUTPATIENT)
Dept: HEMATOLOGY ONCOLOGY | Facility: CLINIC | Age: 60
End: 2025-01-22

## 2025-01-22 ENCOUNTER — OUTPATIENT (OUTPATIENT)
Dept: OUTPATIENT SERVICES | Facility: HOSPITAL | Age: 60
LOS: 1 days | End: 2025-01-22

## 2025-01-22 DIAGNOSIS — Z98.890 OTHER SPECIFIED POSTPROCEDURAL STATES: Chronic | ICD-10-CM

## 2025-01-22 DIAGNOSIS — Z90.49 ACQUIRED ABSENCE OF OTHER SPECIFIED PARTS OF DIGESTIVE TRACT: Chronic | ICD-10-CM

## 2025-01-22 DIAGNOSIS — Z90.89 ACQUIRED ABSENCE OF OTHER ORGANS: Chronic | ICD-10-CM

## 2025-01-22 DIAGNOSIS — C18.9 MALIGNANT NEOPLASM OF COLON, UNSPECIFIED: ICD-10-CM

## 2025-01-22 DIAGNOSIS — Z90.79 ACQUIRED ABSENCE OF OTHER GENITAL ORGAN(S): Chronic | ICD-10-CM

## 2025-01-29 ENCOUNTER — OUTPATIENT (OUTPATIENT)
Dept: OUTPATIENT SERVICES | Facility: HOSPITAL | Age: 60
LOS: 1 days | End: 2025-01-29
Payer: COMMERCIAL

## 2025-01-29 ENCOUNTER — APPOINTMENT (OUTPATIENT)
Dept: INFUSION THERAPY | Facility: CLINIC | Age: 60
End: 2025-01-29

## 2025-01-29 ENCOUNTER — APPOINTMENT (OUTPATIENT)
Dept: HEMATOLOGY ONCOLOGY | Facility: CLINIC | Age: 60
End: 2025-01-29
Payer: MEDICAID

## 2025-01-29 VITALS
HEIGHT: 67 IN | RESPIRATION RATE: 16 BRPM | TEMPERATURE: 99 F | OXYGEN SATURATION: 96 % | SYSTOLIC BLOOD PRESSURE: 114 MMHG | HEART RATE: 102 BPM | WEIGHT: 160.06 LBS | DIASTOLIC BLOOD PRESSURE: 75 MMHG

## 2025-01-29 VITALS
TEMPERATURE: 99 F | OXYGEN SATURATION: 97 % | HEART RATE: 93 BPM | DIASTOLIC BLOOD PRESSURE: 76 MMHG | RESPIRATION RATE: 18 BRPM | SYSTOLIC BLOOD PRESSURE: 114 MMHG

## 2025-01-29 VITALS
SYSTOLIC BLOOD PRESSURE: 123 MMHG | TEMPERATURE: 98.2 F | WEIGHT: 160 LBS | HEART RATE: 95 BPM | HEIGHT: 67 IN | RESPIRATION RATE: 18 BRPM | OXYGEN SATURATION: 98 % | BODY MASS INDEX: 25.11 KG/M2 | DIASTOLIC BLOOD PRESSURE: 81 MMHG

## 2025-01-29 DIAGNOSIS — Z90.89 ACQUIRED ABSENCE OF OTHER ORGANS: Chronic | ICD-10-CM

## 2025-01-29 DIAGNOSIS — R05.9 COUGH, UNSPECIFIED: ICD-10-CM

## 2025-01-29 DIAGNOSIS — Z90.79 ACQUIRED ABSENCE OF OTHER GENITAL ORGAN(S): Chronic | ICD-10-CM

## 2025-01-29 DIAGNOSIS — T45.1X5A TOXIC GASTROENTERITIS AND COLITIS: ICD-10-CM

## 2025-01-29 DIAGNOSIS — Z90.49 ACQUIRED ABSENCE OF OTHER SPECIFIED PARTS OF DIGESTIVE TRACT: Chronic | ICD-10-CM

## 2025-01-29 DIAGNOSIS — Z98.890 OTHER SPECIFIED POSTPROCEDURAL STATES: Chronic | ICD-10-CM

## 2025-01-29 DIAGNOSIS — C78.7 MALIGNANT NEOPLASM OF COLON, UNSPECIFIED: ICD-10-CM

## 2025-01-29 DIAGNOSIS — K52.1 TOXIC GASTROENTERITIS AND COLITIS: ICD-10-CM

## 2025-01-29 DIAGNOSIS — C78.00 SECONDARY MALIGNANT NEOPLASM OF UNSPECIFIED LUNG: ICD-10-CM

## 2025-01-29 DIAGNOSIS — C18.9 MALIGNANT NEOPLASM OF COLON, UNSPECIFIED: ICD-10-CM

## 2025-01-29 LAB
ALBUMIN SERPL ELPH-MCNC: 3.3 G/DL
ALP BLD-CCNC: 182 U/L
ALT SERPL-CCNC: 68 U/L
ANION GAP SERPL CALC-SCNC: 0 MMOL/L
AST SERPL-CCNC: 58 U/L
BILIRUB SERPL-MCNC: 0.6 MG/DL
BUN SERPL-MCNC: 20 MG/DL
CALCIUM SERPL-MCNC: 9.7 MG/DL
CHLORIDE SERPL-SCNC: 114 MMOL/L
CO2 SERPL-SCNC: 28 MMOL/L
CREAT SERPL-MCNC: 0.6 MG/DL
EGFR: 105 ML/MIN/1.73M2
GLUCOSE SERPL-MCNC: 129 MG/DL
HCT VFR BLD CALC: 43.1 %
HGB BLD-MCNC: 14.1 G/DL
LYMPHOCYTES # BLD AUTO: 2.9 K/UL
LYMPHOCYTES NFR BLD AUTO: 18.3 %
MAGNESIUM SERPL-MCNC: 1.8 MG/DL
MAN DIFF?: NO
MCHC RBC-ENTMCNC: 31.4 PG
MCHC RBC-ENTMCNC: 32.7 G/DL
MCV RBC AUTO: 96 FL
NEUTROPHILS # BLD AUTO: 10.9 K/UL
NEUTROPHILS NFR BLD AUTO: 70.2 %
PLATELET # BLD AUTO: 257 K/UL
POTASSIUM SERPL-SCNC: 5 MMOL/L
PROT SERPL-MCNC: 6.7 G/DL
RBC # BLD: 4.49 M/UL
RBC # FLD: 16 %
SODIUM SERPL-SCNC: 142 MMOL/L
WBC # FLD AUTO: 15.6 K/UL

## 2025-01-29 PROCEDURE — 96413 CHEMO IV INFUSION 1 HR: CPT

## 2025-01-29 PROCEDURE — 96411 CHEMO IV PUSH ADDL DRUG: CPT

## 2025-01-29 PROCEDURE — 96375 TX/PRO/DX INJ NEW DRUG ADDON: CPT

## 2025-01-29 PROCEDURE — 99214 OFFICE O/P EST MOD 30 MIN: CPT | Mod: 25

## 2025-01-29 PROCEDURE — 96416 CHEMO PROLONG INFUSE W/PUMP: CPT

## 2025-01-29 PROCEDURE — 96367 TX/PROPH/DG ADDL SEQ IV INF: CPT

## 2025-01-29 PROCEDURE — 96417 CHEMO IV INFUS EACH ADDL SEQ: CPT

## 2025-01-29 RX ORDER — ATROPINE SULFATE 0.1 MG/ML
0.4 INJECTION PARENTERAL ONCE
Refills: 0 | Status: COMPLETED | OUTPATIENT
Start: 2025-01-29 | End: 2025-01-29

## 2025-01-29 RX ORDER — DEXAMETHASONE SODIUM PHOSPHATE 4 MG/ML
10 INJECTION, SOLUTION INTRA-ARTICULAR; INTRALESIONAL; INTRAMUSCULAR; INTRAVENOUS; SOFT TISSUE ONCE
Refills: 0 | Status: COMPLETED | OUTPATIENT
Start: 2025-01-29 | End: 2025-01-29

## 2025-01-29 RX ORDER — LIDOCAINE HYDROCHLORIDE 30 MG/G
1 CREAM TOPICAL ONCE
Refills: 0 | Status: COMPLETED | OUTPATIENT
Start: 2025-01-29 | End: 2025-01-29

## 2025-01-29 RX ORDER — FOSAPREPITANT 150 MG/5ML
150 INJECTION, POWDER, LYOPHILIZED, FOR SOLUTION INTRAVENOUS ONCE
Refills: 0 | Status: COMPLETED | OUTPATIENT
Start: 2025-01-29 | End: 2025-01-29

## 2025-01-29 RX ORDER — FLUOROURACIL 50 MG/ML
740 INJECTION, SOLUTION INTRAVENOUS ONCE
Refills: 0 | Status: COMPLETED | OUTPATIENT
Start: 2025-01-29 | End: 2025-01-29

## 2025-01-29 RX ORDER — PALONOSETRON 0.05 MG/ML
0.25 INJECTION, SOLUTION INTRAVENOUS ONCE
Refills: 0 | Status: COMPLETED | OUTPATIENT
Start: 2025-01-29 | End: 2025-01-29

## 2025-01-29 RX ORDER — PANITUMUMAB 400 MG/20ML
290 SOLUTION INTRAVENOUS ONCE
Refills: 0 | Status: COMPLETED | OUTPATIENT
Start: 2025-01-29 | End: 2025-01-29

## 2025-01-29 RX ORDER — GUAIFENESIN AND CODEINE PHOSPHATE 10; 100 MG/5ML; MG/5ML
100-10 SOLUTION ORAL
Qty: 1 | Refills: 0 | Status: ACTIVE | COMMUNITY
Start: 2025-01-29 | End: 1900-01-01

## 2025-01-29 RX ORDER — FLUOROURACIL 50 MG/ML
4460 INJECTION, SOLUTION INTRAVENOUS ONCE
Refills: 0 | Status: COMPLETED | OUTPATIENT
Start: 2025-01-29 | End: 2025-01-29

## 2025-01-29 RX ORDER — IRINOTECAN HYDROCHLOIDE 20 MG/ML
280 INJECTION INTRAVENOUS ONCE
Refills: 0 | Status: COMPLETED | OUTPATIENT
Start: 2025-01-29 | End: 2025-01-29

## 2025-01-29 RX ORDER — LEUCOVORIN CALCIUM 50 MG
740 VIAL (EA) INJECTION ONCE
Refills: 0 | Status: COMPLETED | OUTPATIENT
Start: 2025-01-29 | End: 2025-01-29

## 2025-01-29 RX ADMIN — IRINOTECAN HYDROCHLOIDE 280 MILLIGRAM(S): 20 INJECTION INTRAVENOUS at 14:41

## 2025-01-29 RX ADMIN — Medication 740 MILLIGRAM(S): at 14:41

## 2025-01-29 RX ADMIN — FOSAPREPITANT 500 MILLIGRAM(S): 150 INJECTION, POWDER, LYOPHILIZED, FOR SOLUTION INTRAVENOUS at 13:39

## 2025-01-29 RX ADMIN — IRINOTECAN HYDROCHLOIDE 280 MILLIGRAM(S): 20 INJECTION INTRAVENOUS at 16:25

## 2025-01-29 RX ADMIN — FOSAPREPITANT 150 MILLIGRAM(S): 150 INJECTION, POWDER, LYOPHILIZED, FOR SOLUTION INTRAVENOUS at 14:09

## 2025-01-29 RX ADMIN — DEXAMETHASONE SODIUM PHOSPHATE 10 MILLIGRAM(S): 4 INJECTION, SOLUTION INTRA-ARTICULAR; INTRALESIONAL; INTRAMUSCULAR; INTRAVENOUS; SOFT TISSUE at 13:37

## 2025-01-29 RX ADMIN — ATROPINE SULFATE 0.4 MILLIGRAM(S): 0.1 INJECTION PARENTERAL at 16:35

## 2025-01-29 RX ADMIN — FLUOROURACIL 177.6 MILLIGRAM(S): 50 INJECTION, SOLUTION INTRAVENOUS at 16:29

## 2025-01-29 RX ADMIN — PANITUMUMAB 290 MILLIGRAM(S): 400 SOLUTION INTRAVENOUS at 12:45

## 2025-01-29 RX ADMIN — PALONOSETRON 0.25 MILLIGRAM(S): 0.05 INJECTION, SOLUTION INTRAVENOUS at 13:21

## 2025-01-29 RX ADMIN — FLUOROURACIL 4460 MILLIGRAM(S): 50 INJECTION, SOLUTION INTRAVENOUS at 16:30

## 2025-01-29 RX ADMIN — Medication 740 MILLIGRAM(S): at 16:25

## 2025-01-29 RX ADMIN — PANITUMUMAB 290 MILLIGRAM(S): 400 SOLUTION INTRAVENOUS at 13:15

## 2025-01-29 RX ADMIN — DEXAMETHASONE SODIUM PHOSPHATE 204 MILLIGRAM(S): 4 INJECTION, SOLUTION INTRA-ARTICULAR; INTRALESIONAL; INTRAMUSCULAR; INTRAVENOUS; SOFT TISSUE at 13:22

## 2025-01-31 ENCOUNTER — APPOINTMENT (OUTPATIENT)
Dept: INFUSION THERAPY | Facility: CLINIC | Age: 60
End: 2025-01-31

## 2025-01-31 ENCOUNTER — OUTPATIENT (OUTPATIENT)
Dept: OUTPATIENT SERVICES | Facility: HOSPITAL | Age: 60
LOS: 1 days | End: 2025-01-31
Payer: COMMERCIAL

## 2025-01-31 VITALS
OXYGEN SATURATION: 96 % | DIASTOLIC BLOOD PRESSURE: 75 MMHG | TEMPERATURE: 98 F | HEART RATE: 72 BPM | RESPIRATION RATE: 18 BRPM | SYSTOLIC BLOOD PRESSURE: 109 MMHG

## 2025-01-31 DIAGNOSIS — Z90.89 ACQUIRED ABSENCE OF OTHER ORGANS: Chronic | ICD-10-CM

## 2025-01-31 DIAGNOSIS — Z90.49 ACQUIRED ABSENCE OF OTHER SPECIFIED PARTS OF DIGESTIVE TRACT: Chronic | ICD-10-CM

## 2025-01-31 DIAGNOSIS — Z90.79 ACQUIRED ABSENCE OF OTHER GENITAL ORGAN(S): Chronic | ICD-10-CM

## 2025-01-31 DIAGNOSIS — Z98.890 OTHER SPECIFIED POSTPROCEDURAL STATES: Chronic | ICD-10-CM

## 2025-01-31 DIAGNOSIS — C18.9 MALIGNANT NEOPLASM OF COLON, UNSPECIFIED: ICD-10-CM

## 2025-01-31 PROCEDURE — 96372 THER/PROPH/DIAG INJ SC/IM: CPT

## 2025-01-31 RX ORDER — PEGFILGRASTIM-BMEZ 6 MG/.6ML
6 INJECTION SUBCUTANEOUS ONCE
Refills: 0 | Status: COMPLETED | OUTPATIENT
Start: 2025-01-31 | End: 2025-01-31

## 2025-01-31 RX ORDER — BACTERIOSTATIC SODIUM CHLORIDE 0.9 %
10 VIAL (ML) INJECTION ONCE
Refills: 0 | Status: COMPLETED | OUTPATIENT
Start: 2025-01-31 | End: 2025-01-31

## 2025-01-31 RX ADMIN — PEGFILGRASTIM-BMEZ 6 MILLIGRAM(S): 6 INJECTION SUBCUTANEOUS at 14:27

## 2025-01-31 RX ADMIN — Medication 10 MILLILITER(S): at 14:24

## 2025-01-31 RX ADMIN — FLUOROURACIL 4460 MILLIGRAM(S): 50 INJECTION, SOLUTION INTRAVENOUS at 14:24

## 2025-01-31 NOTE — DISCHARGE INSTRUCTIONS: CHEMOTHERAPY - NSRNDCEVAL_HEME_A_AMB_QA
Please share these instructions with your physicians if you are seen by a physician between treatment room visits.
Yes

## 2025-02-05 ENCOUNTER — APPOINTMENT (OUTPATIENT)
Dept: HEMATOLOGY ONCOLOGY | Facility: CLINIC | Age: 60
End: 2025-02-05

## 2025-02-19 ENCOUNTER — NON-APPOINTMENT (OUTPATIENT)
Age: 60
End: 2025-02-19

## 2025-02-19 ENCOUNTER — APPOINTMENT (OUTPATIENT)
Dept: HEMATOLOGY ONCOLOGY | Facility: CLINIC | Age: 60
End: 2025-02-19
Payer: MEDICAID

## 2025-02-19 ENCOUNTER — APPOINTMENT (OUTPATIENT)
Dept: INFUSION THERAPY | Facility: CLINIC | Age: 60
End: 2025-02-19

## 2025-02-19 ENCOUNTER — OUTPATIENT (OUTPATIENT)
Dept: OUTPATIENT SERVICES | Facility: HOSPITAL | Age: 60
LOS: 1 days | End: 2025-02-19
Payer: COMMERCIAL

## 2025-02-19 VITALS
SYSTOLIC BLOOD PRESSURE: 103 MMHG | HEIGHT: 67 IN | HEART RATE: 80 BPM | OXYGEN SATURATION: 98 % | DIASTOLIC BLOOD PRESSURE: 68 MMHG | WEIGHT: 160.94 LBS | RESPIRATION RATE: 17 BRPM | TEMPERATURE: 99 F

## 2025-02-19 VITALS
DIASTOLIC BLOOD PRESSURE: 83 MMHG | HEIGHT: 67 IN | WEIGHT: 161.5 LBS | BODY MASS INDEX: 25.35 KG/M2 | OXYGEN SATURATION: 97 % | HEART RATE: 89 BPM | TEMPERATURE: 97.1 F | RESPIRATION RATE: 18 BRPM | SYSTOLIC BLOOD PRESSURE: 115 MMHG

## 2025-02-19 VITALS
SYSTOLIC BLOOD PRESSURE: 105 MMHG | OXYGEN SATURATION: 95 % | HEART RATE: 76 BPM | RESPIRATION RATE: 17 BRPM | TEMPERATURE: 98 F | DIASTOLIC BLOOD PRESSURE: 68 MMHG

## 2025-02-19 DIAGNOSIS — G89.3 NEOPLASM RELATED PAIN (ACUTE) (CHRONIC): ICD-10-CM

## 2025-02-19 DIAGNOSIS — C78.7 MALIGNANT NEOPLASM OF COLON, UNSPECIFIED: ICD-10-CM

## 2025-02-19 DIAGNOSIS — Z90.49 ACQUIRED ABSENCE OF OTHER SPECIFIED PARTS OF DIGESTIVE TRACT: Chronic | ICD-10-CM

## 2025-02-19 DIAGNOSIS — Z98.890 OTHER SPECIFIED POSTPROCEDURAL STATES: Chronic | ICD-10-CM

## 2025-02-19 DIAGNOSIS — C18.9 MALIGNANT NEOPLASM OF COLON, UNSPECIFIED: ICD-10-CM

## 2025-02-19 DIAGNOSIS — Z90.79 ACQUIRED ABSENCE OF OTHER GENITAL ORGAN(S): Chronic | ICD-10-CM

## 2025-02-19 DIAGNOSIS — Z90.89 ACQUIRED ABSENCE OF OTHER ORGANS: Chronic | ICD-10-CM

## 2025-02-19 LAB
ALBUMIN SERPL ELPH-MCNC: 3.5 G/DL
ALP BLD-CCNC: 153 U/L
ALT SERPL-CCNC: 29 U/L
ANION GAP SERPL CALC-SCNC: 4 MMOL/L
AST SERPL-CCNC: 31 U/L
BILIRUB SERPL-MCNC: 1.1 MG/DL
BUN SERPL-MCNC: 11 MG/DL
CALCIUM SERPL-MCNC: 9.4 MG/DL
CEA SERPL-MCNC: 11.4 NG/ML
CHLORIDE SERPL-SCNC: 110 MMOL/L
CO2 SERPL-SCNC: 27 MMOL/L
CREAT SERPL-MCNC: 0.4 MG/DL
EGFR: 115 ML/MIN/1.73M2
GLUCOSE SERPL-MCNC: 116 MG/DL
HCT VFR BLD CALC: 40.7 %
HGB BLD-MCNC: 13.4 G/DL
LYMPHOCYTES # BLD AUTO: 1.7 K/UL
LYMPHOCYTES NFR BLD AUTO: 14.7 %
MAGNESIUM SERPL-MCNC: 2 MG/DL
MAN DIFF?: NO
MCHC RBC-ENTMCNC: 31.3 PG
MCHC RBC-ENTMCNC: 32.9 G/DL
MCV RBC AUTO: 95.1 FL
NEUTROPHILS # BLD AUTO: 8.9 K/UL
NEUTROPHILS NFR BLD AUTO: 75.2 %
PLATELET # BLD AUTO: 156 K/UL
POTASSIUM SERPL-SCNC: 4.6 MMOL/L
PROT SERPL-MCNC: 7.4 G/DL
RBC # BLD: 4.28 M/UL
RBC # FLD: 16 %
SODIUM SERPL-SCNC: 141 MMOL/L
WBC # FLD AUTO: 11.8 K/UL

## 2025-02-19 PROCEDURE — 96411 CHEMO IV PUSH ADDL DRUG: CPT

## 2025-02-19 PROCEDURE — 96413 CHEMO IV INFUSION 1 HR: CPT

## 2025-02-19 PROCEDURE — 96417 CHEMO IV INFUS EACH ADDL SEQ: CPT

## 2025-02-19 PROCEDURE — 96367 TX/PROPH/DG ADDL SEQ IV INF: CPT

## 2025-02-19 PROCEDURE — 96416 CHEMO PROLONG INFUSE W/PUMP: CPT

## 2025-02-19 PROCEDURE — 96375 TX/PRO/DX INJ NEW DRUG ADDON: CPT

## 2025-02-19 PROCEDURE — 99215 OFFICE O/P EST HI 40 MIN: CPT | Mod: 25

## 2025-02-19 RX ORDER — IRINOTECAN HYDROCHLORIDE 20 MG/ML
280 INJECTION, SOLUTION INTRAVENOUS ONCE
Refills: 0 | Status: COMPLETED | OUTPATIENT
Start: 2025-02-19 | End: 2025-02-19

## 2025-02-19 RX ORDER — LEUCOVORIN CALCIUM 50 MG/5ML
740 INJECTION, POWDER, LYOPHILIZED, FOR SOLUTION INTRAMUSCULAR; INTRAVENOUS ONCE
Refills: 0 | Status: COMPLETED | OUTPATIENT
Start: 2025-02-19 | End: 2025-02-19

## 2025-02-19 RX ORDER — FLUOROURACIL 50 MG/ML
4460 INJECTION, SOLUTION INTRAVENOUS ONCE
Refills: 0 | Status: COMPLETED | OUTPATIENT
Start: 2025-02-19 | End: 2025-02-19

## 2025-02-19 RX ORDER — LIDOCAINE HYDROCHLORIDE 20 MG/ML
1 JELLY TOPICAL ONCE
Refills: 0 | Status: COMPLETED | OUTPATIENT
Start: 2025-02-19 | End: 2025-02-19

## 2025-02-19 RX ORDER — DEXAMETHASONE 0.5 MG/1
10 TABLET ORAL ONCE
Refills: 0 | Status: COMPLETED | OUTPATIENT
Start: 2025-02-19 | End: 2025-02-19

## 2025-02-19 RX ORDER — PALONOSETRON HYDROCHLORIDE 0.05 MG/ML
0.25 INJECTION, SOLUTION INTRAVENOUS ONCE
Refills: 0 | Status: COMPLETED | OUTPATIENT
Start: 2025-02-19 | End: 2025-02-19

## 2025-02-19 RX ORDER — FLUOROURACIL 50 MG/ML
740 INJECTION, SOLUTION INTRAVENOUS ONCE
Refills: 0 | Status: COMPLETED | OUTPATIENT
Start: 2025-02-19 | End: 2025-02-19

## 2025-02-19 RX ORDER — FOSAPREPITANT 150 MG/5ML
150 INJECTION, POWDER, LYOPHILIZED, FOR SOLUTION INTRAVENOUS ONCE
Refills: 0 | Status: COMPLETED | OUTPATIENT
Start: 2025-02-19 | End: 2025-02-19

## 2025-02-19 RX ORDER — PANITUMUMAB 100 MG/5ML
290 SOLUTION INTRAVENOUS ONCE
Refills: 0 | Status: COMPLETED | OUTPATIENT
Start: 2025-02-19 | End: 2025-02-19

## 2025-02-19 RX ADMIN — LEUCOVORIN CALCIUM 740 MILLIGRAM(S): 50 INJECTION, POWDER, LYOPHILIZED, FOR SOLUTION INTRAMUSCULAR; INTRAVENOUS at 17:05

## 2025-02-19 RX ADMIN — LEUCOVORIN CALCIUM 740 MILLIGRAM(S): 50 INJECTION, POWDER, LYOPHILIZED, FOR SOLUTION INTRAMUSCULAR; INTRAVENOUS at 15:25

## 2025-02-19 RX ADMIN — FOSAPREPITANT 500 MILLIGRAM(S): 150 INJECTION, POWDER, LYOPHILIZED, FOR SOLUTION INTRAVENOUS at 15:20

## 2025-02-19 RX ADMIN — Medication 0.4 MILLIGRAM(S): at 15:15

## 2025-02-19 RX ADMIN — DEXAMETHASONE 204 MILLIGRAM(S): 0.5 TABLET ORAL at 14:25

## 2025-02-19 RX ADMIN — FLUOROURACIL 4460 MILLIGRAM(S): 50 INJECTION, SOLUTION INTRAVENOUS at 17:15

## 2025-02-19 RX ADMIN — DEXAMETHASONE 10 MILLIGRAM(S): 0.5 TABLET ORAL at 14:42

## 2025-02-19 RX ADMIN — PALONOSETRON HYDROCHLORIDE 0.25 MILLIGRAM(S): 0.05 INJECTION, SOLUTION INTRAVENOUS at 14:42

## 2025-02-19 RX ADMIN — IRINOTECAN HYDROCHLORIDE 280 MILLIGRAM(S): 20 INJECTION, SOLUTION INTRAVENOUS at 15:27

## 2025-02-19 RX ADMIN — IRINOTECAN HYDROCHLORIDE 280 MILLIGRAM(S): 20 INJECTION, SOLUTION INTRAVENOUS at 17:10

## 2025-02-19 RX ADMIN — FOSAPREPITANT 150 MILLIGRAM(S): 150 INJECTION, POWDER, LYOPHILIZED, FOR SOLUTION INTRAVENOUS at 15:50

## 2025-02-19 RX ADMIN — PANITUMUMAB 290 MILLIGRAM(S): 100 SOLUTION INTRAVENOUS at 13:48

## 2025-02-19 RX ADMIN — FLUOROURACIL 177.6 MILLIGRAM(S): 50 INJECTION, SOLUTION INTRAVENOUS at 17:07

## 2025-02-19 RX ADMIN — PANITUMUMAB 290 MILLIGRAM(S): 100 SOLUTION INTRAVENOUS at 14:20

## 2025-02-21 ENCOUNTER — OUTPATIENT (OUTPATIENT)
Dept: OUTPATIENT SERVICES | Facility: HOSPITAL | Age: 60
LOS: 1 days | End: 2025-02-21
Payer: COMMERCIAL

## 2025-02-21 ENCOUNTER — APPOINTMENT (OUTPATIENT)
Dept: INFUSION THERAPY | Facility: CLINIC | Age: 60
End: 2025-02-21

## 2025-02-21 VITALS
DIASTOLIC BLOOD PRESSURE: 76 MMHG | SYSTOLIC BLOOD PRESSURE: 108 MMHG | HEART RATE: 95 BPM | TEMPERATURE: 97 F | OXYGEN SATURATION: 99 % | RESPIRATION RATE: 18 BRPM

## 2025-02-21 DIAGNOSIS — Z90.49 ACQUIRED ABSENCE OF OTHER SPECIFIED PARTS OF DIGESTIVE TRACT: Chronic | ICD-10-CM

## 2025-02-21 DIAGNOSIS — C78.00 SECONDARY MALIGNANT NEOPLASM OF UNSPECIFIED LUNG: ICD-10-CM

## 2025-02-21 DIAGNOSIS — Z98.890 OTHER SPECIFIED POSTPROCEDURAL STATES: Chronic | ICD-10-CM

## 2025-02-21 DIAGNOSIS — Z90.89 ACQUIRED ABSENCE OF OTHER ORGANS: Chronic | ICD-10-CM

## 2025-02-21 DIAGNOSIS — Z90.79 ACQUIRED ABSENCE OF OTHER GENITAL ORGAN(S): Chronic | ICD-10-CM

## 2025-02-21 PROCEDURE — 96372 THER/PROPH/DIAG INJ SC/IM: CPT

## 2025-02-21 RX ORDER — PEGFILGRASTIM-CBQV 6 MG/.6ML
6 INJECTION, SOLUTION SUBCUTANEOUS ONCE
Refills: 0 | Status: COMPLETED | OUTPATIENT
Start: 2025-02-21 | End: 2025-02-21

## 2025-02-21 RX ADMIN — PEGFILGRASTIM-CBQV 6 MILLIGRAM(S): 6 INJECTION, SOLUTION SUBCUTANEOUS at 16:50

## 2025-02-21 RX ADMIN — Medication 10 MILLILITER(S): at 16:49

## 2025-03-05 ENCOUNTER — APPOINTMENT (OUTPATIENT)
Dept: HEMATOLOGY ONCOLOGY | Facility: CLINIC | Age: 60
End: 2025-03-05

## 2025-03-05 ENCOUNTER — APPOINTMENT (OUTPATIENT)
Dept: INFUSION THERAPY | Facility: CLINIC | Age: 60
End: 2025-03-05

## 2025-03-05 VITALS
DIASTOLIC BLOOD PRESSURE: 73 MMHG | SYSTOLIC BLOOD PRESSURE: 109 MMHG | RESPIRATION RATE: 18 BRPM | WEIGHT: 166 LBS | OXYGEN SATURATION: 98 % | BODY MASS INDEX: 26.06 KG/M2 | TEMPERATURE: 97.7 F | HEART RATE: 98 BPM | HEIGHT: 67 IN

## 2025-03-05 LAB
ALBUMIN SERPL ELPH-MCNC: 3.5 G/DL
ALP BLD-CCNC: 188 U/L
ALT SERPL-CCNC: 30 U/L
ANION GAP SERPL CALC-SCNC: 4 MMOL/L
AST SERPL-CCNC: 33 U/L
BILIRUB SERPL-MCNC: 0.6 MG/DL
BUN SERPL-MCNC: 12 MG/DL
CALCIUM SERPL-MCNC: 9.9 MG/DL
CHLORIDE SERPL-SCNC: 112 MMOL/L
CO2 SERPL-SCNC: 27 MMOL/L
CREAT SERPL-MCNC: 0.5 MG/DL
EGFRCR SERPLBLD CKD-EPI 2021: 109 ML/MIN/1.73M2
GLUCOSE SERPL-MCNC: 120 MG/DL
HCT VFR BLD CALC: 41.7 %
HGB BLD-MCNC: 13.7 G/DL
LYMPHOCYTES # BLD AUTO: 2.4 K/UL
LYMPHOCYTES NFR BLD AUTO: 14.9 %
MAGNESIUM SERPL-MCNC: 1.5 MG/DL
MAN DIFF?: NO
MCHC RBC-ENTMCNC: 31.4 PG
MCHC RBC-ENTMCNC: 32.9 G/DL
MCV RBC AUTO: 95.4 FL
NEUTROPHILS # BLD AUTO: 13 K/UL
NEUTROPHILS NFR BLD AUTO: 80 %
PLATELET # BLD AUTO: 184 K/UL
POTASSIUM SERPL-SCNC: 4.1 MMOL/L
PROT SERPL-MCNC: 6.9 G/DL
RBC # BLD: 4.37 M/UL
RBC # FLD: 15.8 %
SODIUM SERPL-SCNC: 143 MMOL/L
WBC # FLD AUTO: 16.2 K/UL

## 2025-03-05 PROCEDURE — 99215 OFFICE O/P EST HI 40 MIN: CPT | Mod: 25

## 2025-03-06 RX ORDER — TRIFLURIDINE AND TIPIRACIL 20; 8.19 MG/1; MG/1
20-8.19 TABLET, FILM COATED ORAL
Qty: 1 | Refills: 4 | Status: ACTIVE | COMMUNITY
Start: 2025-03-06 | End: 1900-01-01

## 2025-03-07 ENCOUNTER — APPOINTMENT (OUTPATIENT)
Dept: INFUSION THERAPY | Facility: CLINIC | Age: 60
End: 2025-03-07

## 2025-03-10 ENCOUNTER — NON-APPOINTMENT (OUTPATIENT)
Age: 60
End: 2025-03-10

## 2025-03-14 ENCOUNTER — APPOINTMENT (OUTPATIENT)
Dept: PALLIATIVE MEDICINE | Facility: CLINIC | Age: 60
End: 2025-03-14
Payer: MEDICAID

## 2025-03-14 DIAGNOSIS — Z51.5 ENCOUNTER FOR PALLIATIVE CARE: ICD-10-CM

## 2025-03-14 DIAGNOSIS — C78.7 MALIGNANT NEOPLASM OF COLON, UNSPECIFIED: ICD-10-CM

## 2025-03-14 DIAGNOSIS — C18.9 MALIGNANT NEOPLASM OF COLON, UNSPECIFIED: ICD-10-CM

## 2025-03-14 DIAGNOSIS — G89.3 NEOPLASM RELATED PAIN (ACUTE) (CHRONIC): ICD-10-CM

## 2025-03-14 DIAGNOSIS — R63.0 ANOREXIA: ICD-10-CM

## 2025-03-14 PROCEDURE — 99213 OFFICE O/P EST LOW 20 MIN: CPT | Mod: 95

## 2025-03-19 ENCOUNTER — APPOINTMENT (OUTPATIENT)
Dept: INFUSION THERAPY | Facility: CLINIC | Age: 60
End: 2025-03-19

## 2025-03-21 ENCOUNTER — APPOINTMENT (OUTPATIENT)
Dept: INFUSION THERAPY | Facility: CLINIC | Age: 60
End: 2025-03-21

## 2025-03-26 ENCOUNTER — APPOINTMENT (OUTPATIENT)
Dept: INFUSION THERAPY | Facility: CLINIC | Age: 60
End: 2025-03-26

## 2025-03-26 ENCOUNTER — APPOINTMENT (OUTPATIENT)
Dept: HEMATOLOGY ONCOLOGY | Facility: CLINIC | Age: 60
End: 2025-03-26
Payer: MEDICAID

## 2025-03-26 ENCOUNTER — NON-APPOINTMENT (OUTPATIENT)
Age: 60
End: 2025-03-26

## 2025-03-26 ENCOUNTER — OUTPATIENT (OUTPATIENT)
Dept: OUTPATIENT SERVICES | Facility: HOSPITAL | Age: 60
LOS: 1 days | End: 2025-03-26
Payer: COMMERCIAL

## 2025-03-26 VITALS
DIASTOLIC BLOOD PRESSURE: 74 MMHG | SYSTOLIC BLOOD PRESSURE: 110 MMHG | TEMPERATURE: 97 F | HEART RATE: 89 BPM | WEIGHT: 162.04 LBS | HEIGHT: 67 IN | OXYGEN SATURATION: 98 % | RESPIRATION RATE: 18 BRPM

## 2025-03-26 VITALS
TEMPERATURE: 97 F | HEART RATE: 87 BPM | RESPIRATION RATE: 18 BRPM | SYSTOLIC BLOOD PRESSURE: 114 MMHG | OXYGEN SATURATION: 98 % | DIASTOLIC BLOOD PRESSURE: 74 MMHG

## 2025-03-26 VITALS
HEART RATE: 97 BPM | OXYGEN SATURATION: 100 % | DIASTOLIC BLOOD PRESSURE: 83 MMHG | SYSTOLIC BLOOD PRESSURE: 128 MMHG | HEIGHT: 67 IN | TEMPERATURE: 97.7 F | RESPIRATION RATE: 18 BRPM | WEIGHT: 165 LBS | BODY MASS INDEX: 25.9 KG/M2

## 2025-03-26 DIAGNOSIS — R23.8 OTHER SKIN CHANGES: ICD-10-CM

## 2025-03-26 DIAGNOSIS — Z98.890 OTHER SPECIFIED POSTPROCEDURAL STATES: Chronic | ICD-10-CM

## 2025-03-26 DIAGNOSIS — D70.1 AGRANULOCYTOSIS SECONDARY TO CANCER CHEMOTHERAPY: ICD-10-CM

## 2025-03-26 DIAGNOSIS — Z90.49 ACQUIRED ABSENCE OF OTHER SPECIFIED PARTS OF DIGESTIVE TRACT: Chronic | ICD-10-CM

## 2025-03-26 DIAGNOSIS — C78.00 SECONDARY MALIGNANT NEOPLASM OF UNSPECIFIED LUNG: ICD-10-CM

## 2025-03-26 DIAGNOSIS — C78.7 MALIGNANT NEOPLASM OF COLON, UNSPECIFIED: ICD-10-CM

## 2025-03-26 DIAGNOSIS — C18.9 MALIGNANT NEOPLASM OF COLON, UNSPECIFIED: ICD-10-CM

## 2025-03-26 DIAGNOSIS — T45.1X5A AGRANULOCYTOSIS SECONDARY TO CANCER CHEMOTHERAPY: ICD-10-CM

## 2025-03-26 DIAGNOSIS — Z86.39 PERSONAL HISTORY OF OTHER ENDOCRINE, NUTRITIONAL AND METABOLIC DISEASE: ICD-10-CM

## 2025-03-26 DIAGNOSIS — L27.0 GENERALIZED SKIN ERUPTION DUE TO DRUGS AND MEDICAMENTS TAKEN INTERNALLY: ICD-10-CM

## 2025-03-26 DIAGNOSIS — G89.3 NEOPLASM RELATED PAIN (ACUTE) (CHRONIC): ICD-10-CM

## 2025-03-26 DIAGNOSIS — Z90.89 ACQUIRED ABSENCE OF OTHER ORGANS: Chronic | ICD-10-CM

## 2025-03-26 DIAGNOSIS — Z90.79 ACQUIRED ABSENCE OF OTHER GENITAL ORGAN(S): Chronic | ICD-10-CM

## 2025-03-26 LAB
ALBUMIN SERPL ELPH-MCNC: 3.8 G/DL
ALP BLD-CCNC: 176 U/L
ALT SERPL-CCNC: 32 U/L
ANION GAP SERPL CALC-SCNC: 5 MMOL/L
AST SERPL-CCNC: 39 U/L
BILIRUB SERPL-MCNC: 0.9 MG/DL
BUN SERPL-MCNC: 15 MG/DL
CALCIUM SERPL-MCNC: 10.2 MG/DL
CHLORIDE SERPL-SCNC: 110 MMOL/L
CO2 SERPL-SCNC: 25 MMOL/L
CREAT SERPL-MCNC: 0.5 MG/DL
EGFRCR SERPLBLD CKD-EPI 2021: 109 ML/MIN/1.73M2
GLUCOSE SERPL-MCNC: 98 MG/DL
HCT VFR BLD CALC: 40.1 %
HGB BLD-MCNC: 13 G/DL
LYMPHOCYTES # BLD AUTO: 1.7 K/UL
LYMPHOCYTES NFR BLD AUTO: 24.2 %
MAN DIFF?: NO
MCHC RBC-ENTMCNC: 30.3 PG
MCHC RBC-ENTMCNC: 32.4 G/DL
MCV RBC AUTO: 93.5 FL
NEUTROPHILS # BLD AUTO: 4.7 K/UL
NEUTROPHILS NFR BLD AUTO: 65.6 %
PLATELET # BLD AUTO: 242 K/UL
POTASSIUM SERPL-SCNC: 4.9 MMOL/L
PROT SERPL-MCNC: 7.5 G/DL
RBC # BLD: 4.29 M/UL
RBC # FLD: 15.8 %
SODIUM SERPL-SCNC: 140 MMOL/L
WBC # FLD AUTO: 7.1 K/UL

## 2025-03-26 PROCEDURE — 96409 CHEMO IV PUSH SNGL DRUG: CPT

## 2025-03-26 PROCEDURE — 99215 OFFICE O/P EST HI 40 MIN: CPT | Mod: 25

## 2025-03-26 RX ORDER — BEVACIZUMAB-MALY 400 MG/16ML
375 INJECTION, SOLUTION INTRAVENOUS ONCE
Refills: 0 | Status: COMPLETED | OUTPATIENT
Start: 2025-03-26 | End: 2025-03-26

## 2025-03-26 RX ORDER — LIDOCAINE HYDROCHLORIDE 20 MG/ML
1 JELLY TOPICAL ONCE
Refills: 0 | Status: COMPLETED | OUTPATIENT
Start: 2025-03-26 | End: 2025-03-26

## 2025-03-26 RX ADMIN — BEVACIZUMAB-MALY 375 MILLIGRAM(S): 400 INJECTION, SOLUTION INTRAVENOUS at 12:35

## 2025-03-26 RX ADMIN — Medication 10 MILLILITER(S): at 12:35

## 2025-03-26 RX ADMIN — BEVACIZUMAB-MALY 375 MILLIGRAM(S): 400 INJECTION, SOLUTION INTRAVENOUS at 12:20

## 2025-03-27 LAB — CEA SERPL-MCNC: 42.4 NG/ML

## 2025-04-01 PROBLEM — D70.1 CHEMOTHERAPY-INDUCED NEUTROPENIA: Status: RESOLVED | Noted: 2024-05-02 | Resolved: 2025-04-01

## 2025-04-01 PROBLEM — Z86.39 HISTORY OF HYPOKALEMIA: Status: RESOLVED | Noted: 2024-12-18 | Resolved: 2025-04-01

## 2025-04-01 PROBLEM — L27.0 DRUG-INDUCED SKIN RASH: Status: RESOLVED | Noted: 2024-11-17 | Resolved: 2025-04-01

## 2025-04-09 ENCOUNTER — APPOINTMENT (OUTPATIENT)
Dept: INFUSION THERAPY | Facility: CLINIC | Age: 60
End: 2025-04-09

## 2025-04-09 ENCOUNTER — OUTPATIENT (OUTPATIENT)
Dept: OUTPATIENT SERVICES | Facility: HOSPITAL | Age: 60
LOS: 1 days | End: 2025-04-09
Payer: COMMERCIAL

## 2025-04-09 VITALS
SYSTOLIC BLOOD PRESSURE: 115 MMHG | OXYGEN SATURATION: 99 % | DIASTOLIC BLOOD PRESSURE: 83 MMHG | RESPIRATION RATE: 18 BRPM | HEART RATE: 95 BPM | HEIGHT: 67 IN | WEIGHT: 162.92 LBS | TEMPERATURE: 98 F

## 2025-04-09 DIAGNOSIS — Z98.890 OTHER SPECIFIED POSTPROCEDURAL STATES: Chronic | ICD-10-CM

## 2025-04-09 DIAGNOSIS — C78.00 SECONDARY MALIGNANT NEOPLASM OF UNSPECIFIED LUNG: ICD-10-CM

## 2025-04-09 DIAGNOSIS — Z90.49 ACQUIRED ABSENCE OF OTHER SPECIFIED PARTS OF DIGESTIVE TRACT: Chronic | ICD-10-CM

## 2025-04-09 DIAGNOSIS — Z90.89 ACQUIRED ABSENCE OF OTHER ORGANS: Chronic | ICD-10-CM

## 2025-04-09 DIAGNOSIS — Z90.79 ACQUIRED ABSENCE OF OTHER GENITAL ORGAN(S): Chronic | ICD-10-CM

## 2025-04-09 LAB
ALBUMIN SERPL ELPH-MCNC: 3.6 G/DL — SIGNIFICANT CHANGE UP (ref 3.3–5)
ALP SERPL-CCNC: 174 U/L — HIGH (ref 40–120)
ALT FLD-CCNC: 24 U/L — SIGNIFICANT CHANGE UP (ref 10–45)
ANION GAP SERPL CALC-SCNC: 2 MMOL/L — LOW (ref 5–17)
APPEARANCE UR: CLEAR — SIGNIFICANT CHANGE UP
AST SERPL-CCNC: 35 U/L — SIGNIFICANT CHANGE UP (ref 10–40)
BILIRUB SERPL-MCNC: 0.9 MG/DL — SIGNIFICANT CHANGE UP (ref 0.2–1.2)
BILIRUB UR-MCNC: NEGATIVE — SIGNIFICANT CHANGE UP
BUN SERPL-MCNC: 20 MG/DL — SIGNIFICANT CHANGE UP (ref 7–23)
CALCIUM SERPL-MCNC: 10.6 MG/DL — HIGH (ref 8.4–10.5)
CHLORIDE SERPL-SCNC: 113 MMOL/L — HIGH (ref 96–108)
CO2 SERPL-SCNC: 28 MMOL/L — SIGNIFICANT CHANGE UP (ref 22–31)
COLOR SPEC: YELLOW — SIGNIFICANT CHANGE UP
CREAT SERPL-MCNC: 0.9 MG/DL — SIGNIFICANT CHANGE UP (ref 0.5–1.3)
DIFF PNL FLD: ABNORMAL
EGFR: 75 ML/MIN/1.73M2 — SIGNIFICANT CHANGE UP
EGFR: 75 ML/MIN/1.73M2 — SIGNIFICANT CHANGE UP
GLUCOSE SERPL-MCNC: 102 MG/DL — HIGH (ref 70–99)
GLUCOSE UR QL: NEGATIVE MG/DL — SIGNIFICANT CHANGE UP
HCT VFR BLD CALC: 42 % — SIGNIFICANT CHANGE UP (ref 34.5–45)
HGB BLD-MCNC: 13.8 G/DL — SIGNIFICANT CHANGE UP (ref 11.5–15.5)
KETONES UR-MCNC: ABNORMAL MG/DL
LEUKOCYTE ESTERASE UR-ACNC: ABNORMAL
LYMPHOCYTES # BLD AUTO: 1.8 K/UL — SIGNIFICANT CHANGE UP (ref 1–3.3)
LYMPHOCYTES # BLD AUTO: 31.4 % — SIGNIFICANT CHANGE UP (ref 13–44)
MCHC RBC-ENTMCNC: 30.5 PG — SIGNIFICANT CHANGE UP (ref 27–34)
MCHC RBC-ENTMCNC: 32.9 G/DL — SIGNIFICANT CHANGE UP (ref 32–36)
MCV RBC AUTO: 92.9 FL — SIGNIFICANT CHANGE UP (ref 80–100)
NEUTROPHILS # BLD AUTO: 3.5 K/UL — SIGNIFICANT CHANGE UP (ref 1.8–7.4)
NEUTROPHILS NFR BLD AUTO: 62.1 % — SIGNIFICANT CHANGE UP (ref 43–77)
NITRITE UR-MCNC: NEGATIVE — SIGNIFICANT CHANGE UP
PH UR: 5 — SIGNIFICANT CHANGE UP (ref 5–8)
PLATELET # BLD AUTO: 178 K/UL — SIGNIFICANT CHANGE UP (ref 150–400)
POTASSIUM SERPL-MCNC: 5.3 MMOL/L — SIGNIFICANT CHANGE UP (ref 3.5–5.3)
POTASSIUM SERPL-SCNC: 5.3 MMOL/L — SIGNIFICANT CHANGE UP (ref 3.5–5.3)
PROT SERPL-MCNC: 7.8 G/DL — SIGNIFICANT CHANGE UP (ref 6–8.3)
PROT UR-MCNC: NEGATIVE MG/DL — SIGNIFICANT CHANGE UP
RBC # BLD: 4.52 M/UL — SIGNIFICANT CHANGE UP (ref 3.8–5.2)
RBC # FLD: 16.4 % — HIGH (ref 10.3–14.5)
SODIUM SERPL-SCNC: 143 MMOL/L — SIGNIFICANT CHANGE UP (ref 135–145)
SP GR SPEC: 1.02 — SIGNIFICANT CHANGE UP (ref 1–1.03)
UROBILINOGEN FLD QL: 1 MG/DL — SIGNIFICANT CHANGE UP (ref 0.2–1)
WBC # BLD: 5.7 K/UL — SIGNIFICANT CHANGE UP (ref 3.8–10.5)
WBC # FLD AUTO: 5.7 K/UL — SIGNIFICANT CHANGE UP (ref 3.8–10.5)

## 2025-04-09 PROCEDURE — 36415 COLL VENOUS BLD VENIPUNCTURE: CPT

## 2025-04-09 PROCEDURE — 80053 COMPREHEN METABOLIC PANEL: CPT

## 2025-04-09 PROCEDURE — 85025 COMPLETE CBC W/AUTO DIFF WBC: CPT

## 2025-04-09 PROCEDURE — 96413 CHEMO IV INFUSION 1 HR: CPT

## 2025-04-09 PROCEDURE — 81001 URINALYSIS AUTO W/SCOPE: CPT

## 2025-04-09 RX ORDER — LIDOCAINE HYDROCHLORIDE 20 MG/ML
1 JELLY TOPICAL ONCE
Refills: 0 | Status: COMPLETED | OUTPATIENT
Start: 2025-04-09 | End: 2025-04-09

## 2025-04-09 RX ORDER — BEVACIZUMAB-MALY 400 MG/16ML
375 INJECTION, SOLUTION INTRAVENOUS ONCE
Refills: 0 | Status: COMPLETED | OUTPATIENT
Start: 2025-04-09 | End: 2025-04-09

## 2025-04-09 RX ADMIN — BEVACIZUMAB-MALY 375 MILLIGRAM(S): 400 INJECTION, SOLUTION INTRAVENOUS at 13:30

## 2025-04-09 RX ADMIN — Medication 10 MILLILITER(S): at 13:55

## 2025-04-09 RX ADMIN — BEVACIZUMAB-MALY 375 MILLIGRAM(S): 400 INJECTION, SOLUTION INTRAVENOUS at 13:50

## 2025-04-23 ENCOUNTER — OUTPATIENT (OUTPATIENT)
Dept: OUTPATIENT SERVICES | Facility: HOSPITAL | Age: 60
LOS: 1 days | End: 2025-04-23
Payer: COMMERCIAL

## 2025-04-23 ENCOUNTER — APPOINTMENT (OUTPATIENT)
Dept: INFUSION THERAPY | Facility: CLINIC | Age: 60
End: 2025-04-23

## 2025-04-23 ENCOUNTER — APPOINTMENT (OUTPATIENT)
Dept: HEMATOLOGY ONCOLOGY | Facility: CLINIC | Age: 60
End: 2025-04-23
Payer: MEDICAID

## 2025-04-23 ENCOUNTER — NON-APPOINTMENT (OUTPATIENT)
Age: 60
End: 2025-04-23

## 2025-04-23 VITALS
OXYGEN SATURATION: 98 % | BODY MASS INDEX: 26.21 KG/M2 | RESPIRATION RATE: 18 BRPM | HEART RATE: 73 BPM | HEIGHT: 67 IN | WEIGHT: 167 LBS | SYSTOLIC BLOOD PRESSURE: 130 MMHG | DIASTOLIC BLOOD PRESSURE: 78 MMHG | TEMPERATURE: 97.5 F

## 2025-04-23 VITALS
HEIGHT: 67 IN | DIASTOLIC BLOOD PRESSURE: 82 MMHG | HEART RATE: 77 BPM | WEIGHT: 166.89 LBS | SYSTOLIC BLOOD PRESSURE: 117 MMHG | TEMPERATURE: 98 F | OXYGEN SATURATION: 98 % | RESPIRATION RATE: 18 BRPM

## 2025-04-23 DIAGNOSIS — Z98.890 OTHER SPECIFIED POSTPROCEDURAL STATES: Chronic | ICD-10-CM

## 2025-04-23 DIAGNOSIS — Z90.79 ACQUIRED ABSENCE OF OTHER GENITAL ORGAN(S): Chronic | ICD-10-CM

## 2025-04-23 DIAGNOSIS — C18.9 MALIGNANT NEOPLASM OF COLON, UNSPECIFIED: ICD-10-CM

## 2025-04-23 DIAGNOSIS — C78.00 SECONDARY MALIGNANT NEOPLASM OF UNSPECIFIED LUNG: ICD-10-CM

## 2025-04-23 DIAGNOSIS — C78.7 MALIGNANT NEOPLASM OF COLON, UNSPECIFIED: ICD-10-CM

## 2025-04-23 DIAGNOSIS — Z90.49 ACQUIRED ABSENCE OF OTHER SPECIFIED PARTS OF DIGESTIVE TRACT: Chronic | ICD-10-CM

## 2025-04-23 DIAGNOSIS — Z90.89 ACQUIRED ABSENCE OF OTHER ORGANS: Chronic | ICD-10-CM

## 2025-04-23 LAB
HCT VFR BLD CALC: 40.4 %
HGB BLD-MCNC: 13.1 G/DL
LYMPHOCYTES # BLD AUTO: 0.8 K/UL
LYMPHOCYTES NFR BLD AUTO: 20.6 %
MAN DIFF?: NO
MCHC RBC-ENTMCNC: 30.6 PG
MCHC RBC-ENTMCNC: 32.4 G/DL
MCV RBC AUTO: 94.4 FL
NEUTROPHILS # BLD AUTO: 2.8 K/UL
NEUTROPHILS NFR BLD AUTO: 70.6 %
PLATELET # BLD AUTO: 127 K/UL
RBC # BLD: 4.28 M/UL
RBC # FLD: 17.1 %
WBC # FLD AUTO: 4 K/UL

## 2025-04-23 PROCEDURE — 99214 OFFICE O/P EST MOD 30 MIN: CPT | Mod: 25

## 2025-04-23 PROCEDURE — 96409 CHEMO IV PUSH SNGL DRUG: CPT

## 2025-04-23 RX ORDER — BEVACIZUMAB-MALY 400 MG/16ML
375 INJECTION, SOLUTION INTRAVENOUS ONCE
Refills: 0 | Status: COMPLETED | OUTPATIENT
Start: 2025-04-23 | End: 2025-04-23

## 2025-04-23 RX ORDER — LIDOCAINE HYDROCHLORIDE 20 MG/ML
1 JELLY TOPICAL ONCE
Refills: 0 | Status: COMPLETED | OUTPATIENT
Start: 2025-04-23 | End: 2025-04-23

## 2025-04-23 RX ADMIN — Medication 10 MILLILITER(S): at 13:03

## 2025-04-23 RX ADMIN — BEVACIZUMAB-MALY 375 MILLIGRAM(S): 400 INJECTION, SOLUTION INTRAVENOUS at 13:25

## 2025-04-23 RX ADMIN — BEVACIZUMAB-MALY 375 MILLIGRAM(S): 400 INJECTION, SOLUTION INTRAVENOUS at 13:35

## 2025-04-24 LAB
ALBUMIN SERPL ELPH-MCNC: 3.9 G/DL
ALP BLD-CCNC: 193 U/L
ALT SERPL-CCNC: 29 U/L
ANION GAP SERPL CALC-SCNC: 13 MMOL/L
AST SERPL-CCNC: 25 U/L
BILIRUB SERPL-MCNC: 0.4 MG/DL
BUN SERPL-MCNC: 21 MG/DL
CALCIUM SERPL-MCNC: 9 MG/DL
CEA SERPL-MCNC: 47.6 NG/ML
CHLORIDE SERPL-SCNC: 111 MMOL/L
CO2 SERPL-SCNC: 20 MMOL/L
CREAT SERPL-MCNC: 0.54 MG/DL
EGFRCR SERPLBLD CKD-EPI 2021: 107 ML/MIN/1.73M2
GLUCOSE SERPL-MCNC: 113 MG/DL
POTASSIUM SERPL-SCNC: 3.9 MMOL/L
PROT SERPL-MCNC: 6.5 G/DL
SODIUM SERPL-SCNC: 144 MMOL/L

## 2025-04-26 NOTE — PROGRESS NOTE ADULT - ASSESSMENT
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 She recieved adjuvant therapy and was under observation until 12/2021 when 2 hepatic lesions was seen on imaging so she was started on Folfiri and Erbitux for recurrent metastatic colon cancer. On 3/16/22, underwent a PETCT which showed moderate decrease in size of 2 lesions, + P1K3CA, BRCA2, TP53 mutation with EGFR amplification. Now s/p RA converted to open partial liver resection on 10/6.     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 She recieved adjuvant therapy and was under observation until 12/2021 when 2 hepatic lesions was seen on imaging so she was started on Folfiri and Erbitux for recurrent metastatic colon cancer. On 3/16/22, underwent a PETCT which showed moderate decrease in size of 2 lesions, + P1K3CA, BRCA2, TP53 mutation with EGFR amplification. Now s/p diagnostic laparoscopy , open partial liver resection on 10/6.     start on tylenol w codeine PRN  SDU  reg diet  pain/nausea prn  SQH for DVT ppx  SCDs, OOBA, IS  PPI  OOBA/IS   I have reviewed and confirmed nurses' notes...

## 2025-05-14 ENCOUNTER — APPOINTMENT (OUTPATIENT)
Dept: INFUSION THERAPY | Facility: CLINIC | Age: 60
End: 2025-05-14

## 2025-05-14 ENCOUNTER — OUTPATIENT (OUTPATIENT)
Dept: OUTPATIENT SERVICES | Facility: HOSPITAL | Age: 60
LOS: 1 days | End: 2025-05-14
Payer: COMMERCIAL

## 2025-05-14 VITALS
DIASTOLIC BLOOD PRESSURE: 78 MMHG | TEMPERATURE: 98 F | HEIGHT: 67 IN | RESPIRATION RATE: 18 BRPM | WEIGHT: 167.99 LBS | OXYGEN SATURATION: 99 % | HEART RATE: 88 BPM | SYSTOLIC BLOOD PRESSURE: 119 MMHG

## 2025-05-14 DIAGNOSIS — Z98.890 OTHER SPECIFIED POSTPROCEDURAL STATES: Chronic | ICD-10-CM

## 2025-05-14 DIAGNOSIS — Z90.79 ACQUIRED ABSENCE OF OTHER GENITAL ORGAN(S): Chronic | ICD-10-CM

## 2025-05-14 DIAGNOSIS — C78.00 SECONDARY MALIGNANT NEOPLASM OF UNSPECIFIED LUNG: ICD-10-CM

## 2025-05-14 DIAGNOSIS — Z90.89 ACQUIRED ABSENCE OF OTHER ORGANS: Chronic | ICD-10-CM

## 2025-05-14 DIAGNOSIS — Z90.49 ACQUIRED ABSENCE OF OTHER SPECIFIED PARTS OF DIGESTIVE TRACT: Chronic | ICD-10-CM

## 2025-05-14 LAB
ALBUMIN SERPL ELPH-MCNC: 3.5 G/DL — SIGNIFICANT CHANGE UP (ref 3.3–5)
ALP SERPL-CCNC: 155 U/L — HIGH (ref 40–120)
ALT FLD-CCNC: 32 U/L — SIGNIFICANT CHANGE UP (ref 10–45)
ANION GAP SERPL CALC-SCNC: 5 MMOL/L — SIGNIFICANT CHANGE UP (ref 5–17)
APPEARANCE UR: ABNORMAL
AST SERPL-CCNC: 35 U/L — SIGNIFICANT CHANGE UP (ref 10–40)
BILIRUB SERPL-MCNC: 0.8 MG/DL — SIGNIFICANT CHANGE UP (ref 0.2–1.2)
BILIRUB UR-MCNC: NEGATIVE — SIGNIFICANT CHANGE UP
BUN SERPL-MCNC: 13 MG/DL — SIGNIFICANT CHANGE UP (ref 7–23)
CALCIUM SERPL-MCNC: 9.5 MG/DL — SIGNIFICANT CHANGE UP (ref 8.4–10.5)
CHLORIDE SERPL-SCNC: 112 MMOL/L — HIGH (ref 96–108)
CO2 SERPL-SCNC: 27 MMOL/L — SIGNIFICANT CHANGE UP (ref 22–31)
COLOR SPEC: YELLOW — SIGNIFICANT CHANGE UP
CREAT SERPL-MCNC: 0.7 MG/DL — SIGNIFICANT CHANGE UP (ref 0.5–1.3)
DIFF PNL FLD: NEGATIVE — SIGNIFICANT CHANGE UP
EGFR: 100 ML/MIN/1.73M2 — SIGNIFICANT CHANGE UP
EGFR: 100 ML/MIN/1.73M2 — SIGNIFICANT CHANGE UP
GLUCOSE SERPL-MCNC: 100 MG/DL — HIGH (ref 70–99)
GLUCOSE UR QL: NEGATIVE MG/DL — SIGNIFICANT CHANGE UP
HCT VFR BLD CALC: 39.1 % — SIGNIFICANT CHANGE UP (ref 34.5–45)
HGB BLD-MCNC: 12.8 G/DL — SIGNIFICANT CHANGE UP (ref 11.5–15.5)
KETONES UR QL: ABNORMAL MG/DL
LEUKOCYTE ESTERASE UR-ACNC: ABNORMAL
LYMPHOCYTES # BLD AUTO: 1.5 K/UL — SIGNIFICANT CHANGE UP (ref 1–3.3)
LYMPHOCYTES # BLD AUTO: 41.4 % — SIGNIFICANT CHANGE UP (ref 13–44)
MCHC RBC-ENTMCNC: 31.4 PG — SIGNIFICANT CHANGE UP (ref 27–34)
MCHC RBC-ENTMCNC: 32.7 G/DL — SIGNIFICANT CHANGE UP (ref 32–36)
MCV RBC AUTO: 95.8 FL — SIGNIFICANT CHANGE UP (ref 80–100)
NEUTROPHILS # BLD AUTO: 1.9 K/UL — SIGNIFICANT CHANGE UP (ref 1.8–7.4)
NEUTROPHILS NFR BLD AUTO: 50.9 % — SIGNIFICANT CHANGE UP (ref 43–77)
NITRITE UR-MCNC: NEGATIVE — SIGNIFICANT CHANGE UP
PH UR: 7 — SIGNIFICANT CHANGE UP (ref 5–8)
PLATELET # BLD AUTO: 188 K/UL — SIGNIFICANT CHANGE UP (ref 150–400)
POTASSIUM SERPL-MCNC: 3.9 MMOL/L — SIGNIFICANT CHANGE UP (ref 3.5–5.3)
POTASSIUM SERPL-SCNC: 3.9 MMOL/L — SIGNIFICANT CHANGE UP (ref 3.5–5.3)
PROT SERPL-MCNC: 6.8 G/DL — SIGNIFICANT CHANGE UP (ref 6–8.3)
PROT UR-MCNC: SIGNIFICANT CHANGE UP MG/DL
RBC # BLD: 4.08 M/UL — SIGNIFICANT CHANGE UP (ref 3.8–5.2)
RBC # FLD: 17.5 % — HIGH (ref 10.3–14.5)
SODIUM SERPL-SCNC: 144 MMOL/L — SIGNIFICANT CHANGE UP (ref 135–145)
SP GR SPEC: 1.03 — SIGNIFICANT CHANGE UP (ref 1–1.03)
UROBILINOGEN FLD QL: 1 MG/DL — SIGNIFICANT CHANGE UP (ref 0.2–1)
WBC # BLD: 3.7 K/UL — LOW (ref 3.8–10.5)
WBC # FLD AUTO: 3.7 K/UL — LOW (ref 3.8–10.5)

## 2025-05-14 PROCEDURE — 36415 COLL VENOUS BLD VENIPUNCTURE: CPT

## 2025-05-14 PROCEDURE — 85025 COMPLETE CBC W/AUTO DIFF WBC: CPT

## 2025-05-14 PROCEDURE — 81001 URINALYSIS AUTO W/SCOPE: CPT

## 2025-05-14 PROCEDURE — 96409 CHEMO IV PUSH SNGL DRUG: CPT

## 2025-05-14 PROCEDURE — 80053 COMPREHEN METABOLIC PANEL: CPT

## 2025-05-14 RX ORDER — LIDOCAINE HYDROCHLORIDE 20 MG/ML
1 JELLY TOPICAL ONCE
Refills: 0 | Status: COMPLETED | OUTPATIENT
Start: 2025-05-14 | End: 2025-05-14

## 2025-05-14 RX ORDER — BEVACIZUMAB-MALY 400 MG/16ML
375 INJECTION, SOLUTION INTRAVENOUS ONCE
Refills: 0 | Status: COMPLETED | OUTPATIENT
Start: 2025-05-14 | End: 2025-05-14

## 2025-05-14 RX ADMIN — BEVACIZUMAB-MALY 375 MILLIGRAM(S): 400 INJECTION, SOLUTION INTRAVENOUS at 16:40

## 2025-05-14 RX ADMIN — Medication 10 MILLILITER(S): at 17:20

## 2025-05-14 RX ADMIN — BEVACIZUMAB-MALY 375 MILLIGRAM(S): 400 INJECTION, SOLUTION INTRAVENOUS at 16:59

## 2025-05-28 ENCOUNTER — OUTPATIENT (OUTPATIENT)
Dept: OUTPATIENT SERVICES | Facility: HOSPITAL | Age: 60
LOS: 1 days | End: 2025-05-28
Payer: COMMERCIAL

## 2025-05-28 ENCOUNTER — APPOINTMENT (OUTPATIENT)
Dept: HEMATOLOGY ONCOLOGY | Facility: CLINIC | Age: 60
End: 2025-05-28
Payer: MEDICAID

## 2025-05-28 ENCOUNTER — APPOINTMENT (OUTPATIENT)
Dept: INFUSION THERAPY | Facility: CLINIC | Age: 60
End: 2025-05-28

## 2025-05-28 VITALS
DIASTOLIC BLOOD PRESSURE: 80 MMHG | RESPIRATION RATE: 18 BRPM | BODY MASS INDEX: 25.93 KG/M2 | OXYGEN SATURATION: 99 % | TEMPERATURE: 97.9 F | SYSTOLIC BLOOD PRESSURE: 115 MMHG | HEIGHT: 67 IN | WEIGHT: 165.19 LBS | HEART RATE: 85 BPM

## 2025-05-28 VITALS
OXYGEN SATURATION: 98 % | DIASTOLIC BLOOD PRESSURE: 70 MMHG | SYSTOLIC BLOOD PRESSURE: 104 MMHG | HEART RATE: 77 BPM | RESPIRATION RATE: 18 BRPM | TEMPERATURE: 98 F

## 2025-05-28 VITALS
HEIGHT: 67 IN | DIASTOLIC BLOOD PRESSURE: 71 MMHG | SYSTOLIC BLOOD PRESSURE: 102 MMHG | HEART RATE: 71 BPM | TEMPERATURE: 97 F | WEIGHT: 164.91 LBS | OXYGEN SATURATION: 97 % | RESPIRATION RATE: 18 BRPM

## 2025-05-28 DIAGNOSIS — C78.00 SECONDARY MALIGNANT NEOPLASM OF UNSPECIFIED LUNG: ICD-10-CM

## 2025-05-28 DIAGNOSIS — C78.6 SECONDARY MALIGNANT NEOPLASM OF RETROPERITONEUM AND PERITONEUM: ICD-10-CM

## 2025-05-28 DIAGNOSIS — Z90.89 ACQUIRED ABSENCE OF OTHER ORGANS: Chronic | ICD-10-CM

## 2025-05-28 DIAGNOSIS — Z90.79 ACQUIRED ABSENCE OF OTHER GENITAL ORGAN(S): Chronic | ICD-10-CM

## 2025-05-28 DIAGNOSIS — Z98.890 OTHER SPECIFIED POSTPROCEDURAL STATES: Chronic | ICD-10-CM

## 2025-05-28 DIAGNOSIS — C78.7 MALIGNANT NEOPLASM OF COLON, UNSPECIFIED: ICD-10-CM

## 2025-05-28 DIAGNOSIS — Z90.49 ACQUIRED ABSENCE OF OTHER SPECIFIED PARTS OF DIGESTIVE TRACT: Chronic | ICD-10-CM

## 2025-05-28 DIAGNOSIS — R63.0 ANOREXIA: ICD-10-CM

## 2025-05-28 DIAGNOSIS — G89.3 NEOPLASM RELATED PAIN (ACUTE) (CHRONIC): ICD-10-CM

## 2025-05-28 DIAGNOSIS — C18.9 MALIGNANT NEOPLASM OF COLON, UNSPECIFIED: ICD-10-CM

## 2025-05-28 LAB
ALBUMIN SERPL ELPH-MCNC: 3.6 G/DL
ALP BLD-CCNC: 176 U/L
ALT SERPL-CCNC: 32 U/L
ANION GAP SERPL CALC-SCNC: 13 MMOL/L
AST SERPL-CCNC: 41 U/L
BILIRUB SERPL-MCNC: 0.6 MG/DL
BUN SERPL-MCNC: 14 MG/DL
CALCIUM SERPL-MCNC: 9.7 MG/DL
CHLORIDE SERPL-SCNC: 108 MMOL/L
CO2 SERPL-SCNC: 25 MMOL/L
CREAT SERPL-MCNC: 0.7 MG/DL
EGFRCR SERPLBLD CKD-EPI 2021: 100 ML/MIN/1.73M2
GLUCOSE SERPL-MCNC: 104 MG/DL
HCT VFR BLD CALC: 38.9 %
HGB BLD-MCNC: 12.8 G/DL
LYMPHOCYTES # BLD AUTO: 1.7 K/UL
LYMPHOCYTES NFR BLD AUTO: 41.5 %
MAN DIFF?: NO
MCHC RBC-ENTMCNC: 31 PG
MCHC RBC-ENTMCNC: 32.9 G/DL
MCV RBC AUTO: 94.2 FL
NEUTROPHILS # BLD AUTO: 2.2 K/UL
NEUTROPHILS NFR BLD AUTO: 55 %
PLATELET # BLD AUTO: 123 K/UL
POTASSIUM SERPL-SCNC: 4.3 MMOL/L
PROT SERPL-MCNC: 6.9 G/DL
RBC # BLD: 4.13 M/UL
RBC # FLD: 17.3 %
SODIUM SERPL-SCNC: 146 MMOL/L
WBC # FLD AUTO: 4 K/UL

## 2025-05-28 PROCEDURE — 96409 CHEMO IV PUSH SNGL DRUG: CPT

## 2025-05-28 PROCEDURE — 99214 OFFICE O/P EST MOD 30 MIN: CPT | Mod: 25

## 2025-05-28 RX ORDER — BEVACIZUMAB-MALY 400 MG/16ML
375 INJECTION, SOLUTION INTRAVENOUS ONCE
Refills: 0 | Status: COMPLETED | OUTPATIENT
Start: 2025-05-28 | End: 2025-05-28

## 2025-05-28 RX ORDER — LIDOCAINE HYDROCHLORIDE 20 MG/ML
1 JELLY TOPICAL ONCE
Refills: 0 | Status: COMPLETED | OUTPATIENT
Start: 2025-05-28 | End: 2025-05-28

## 2025-05-28 RX ADMIN — BEVACIZUMAB-MALY 375 MILLIGRAM(S): 400 INJECTION, SOLUTION INTRAVENOUS at 15:40

## 2025-05-28 RX ADMIN — BEVACIZUMAB-MALY 375 MILLIGRAM(S): 400 INJECTION, SOLUTION INTRAVENOUS at 15:50

## 2025-05-28 RX ADMIN — Medication 10 MILLILITER(S): at 15:55

## 2025-05-29 LAB — CEA SERPL-MCNC: 57.9 NG/ML

## 2025-06-09 RX ORDER — LIDOCAINE HYDROCHLORIDE 20 MG/ML
1 JELLY TOPICAL ONCE
Refills: 0 | Status: COMPLETED | OUTPATIENT
Start: 2025-06-11 | End: 2025-06-11

## 2025-06-11 ENCOUNTER — APPOINTMENT (OUTPATIENT)
Dept: HEMATOLOGY ONCOLOGY | Facility: CLINIC | Age: 60
End: 2025-06-11

## 2025-06-11 ENCOUNTER — APPOINTMENT (OUTPATIENT)
Dept: INFUSION THERAPY | Facility: CLINIC | Age: 60
End: 2025-06-11

## 2025-06-11 ENCOUNTER — OUTPATIENT (OUTPATIENT)
Dept: OUTPATIENT SERVICES | Facility: HOSPITAL | Age: 60
LOS: 1 days | End: 2025-06-11
Payer: COMMERCIAL

## 2025-06-11 VITALS
SYSTOLIC BLOOD PRESSURE: 121 MMHG | OXYGEN SATURATION: 97 % | WEIGHT: 162.92 LBS | HEART RATE: 103 BPM | RESPIRATION RATE: 18 BRPM | HEIGHT: 67 IN | TEMPERATURE: 98 F | DIASTOLIC BLOOD PRESSURE: 81 MMHG

## 2025-06-11 DIAGNOSIS — Z98.890 OTHER SPECIFIED POSTPROCEDURAL STATES: Chronic | ICD-10-CM

## 2025-06-11 DIAGNOSIS — Z90.89 ACQUIRED ABSENCE OF OTHER ORGANS: Chronic | ICD-10-CM

## 2025-06-11 DIAGNOSIS — Z90.49 ACQUIRED ABSENCE OF OTHER SPECIFIED PARTS OF DIGESTIVE TRACT: Chronic | ICD-10-CM

## 2025-06-11 DIAGNOSIS — C78.00 SECONDARY MALIGNANT NEOPLASM OF UNSPECIFIED LUNG: ICD-10-CM

## 2025-06-11 DIAGNOSIS — Z90.79 ACQUIRED ABSENCE OF OTHER GENITAL ORGAN(S): Chronic | ICD-10-CM

## 2025-06-11 LAB
ALBUMIN SERPL ELPH-MCNC: 3.8 G/DL — SIGNIFICANT CHANGE UP (ref 3.3–5)
ALP SERPL-CCNC: 207 U/L — HIGH (ref 40–120)
ALT FLD-CCNC: 60 U/L — HIGH (ref 10–45)
ANION GAP SERPL CALC-SCNC: 10 MMOL/L — SIGNIFICANT CHANGE UP (ref 5–17)
AST SERPL-CCNC: 50 U/L — HIGH (ref 10–40)
BILIRUB SERPL-MCNC: 1 MG/DL — SIGNIFICANT CHANGE UP (ref 0.2–1.2)
BUN SERPL-MCNC: 16 MG/DL — SIGNIFICANT CHANGE UP (ref 7–23)
CALCIUM SERPL-MCNC: 9.9 MG/DL — SIGNIFICANT CHANGE UP (ref 8.4–10.5)
CHLORIDE SERPL-SCNC: 111 MMOL/L — HIGH (ref 96–108)
CO2 SERPL-SCNC: 22 MMOL/L — SIGNIFICANT CHANGE UP (ref 22–31)
CREAT SERPL-MCNC: 0.5 MG/DL — SIGNIFICANT CHANGE UP (ref 0.5–1.3)
EGFR: 109 ML/MIN/1.73M2 — SIGNIFICANT CHANGE UP
EGFR: 109 ML/MIN/1.73M2 — SIGNIFICANT CHANGE UP
GLUCOSE SERPL-MCNC: 107 MG/DL — HIGH (ref 70–99)
HCT VFR BLD CALC: 41.4 % — SIGNIFICANT CHANGE UP (ref 34.5–45)
HGB BLD-MCNC: 13.8 G/DL — SIGNIFICANT CHANGE UP (ref 11.5–15.5)
LYMPHOCYTES # BLD AUTO: 1.8 K/UL — SIGNIFICANT CHANGE UP (ref 1–3.3)
LYMPHOCYTES # BLD AUTO: 40.6 % — SIGNIFICANT CHANGE UP (ref 13–44)
MCHC RBC-ENTMCNC: 31.5 PG — SIGNIFICANT CHANGE UP (ref 27–34)
MCHC RBC-ENTMCNC: 33.3 G/DL — SIGNIFICANT CHANGE UP (ref 32–36)
MCV RBC AUTO: 94.5 FL — SIGNIFICANT CHANGE UP (ref 80–100)
NEUTROPHILS # BLD AUTO: 2.4 K/UL — SIGNIFICANT CHANGE UP (ref 1.8–7.4)
NEUTROPHILS NFR BLD AUTO: 52.4 % — SIGNIFICANT CHANGE UP (ref 43–77)
PLATELET # BLD AUTO: 214 K/UL — SIGNIFICANT CHANGE UP (ref 150–400)
POTASSIUM SERPL-MCNC: 4.2 MMOL/L — SIGNIFICANT CHANGE UP (ref 3.5–5.3)
POTASSIUM SERPL-SCNC: 4.2 MMOL/L — SIGNIFICANT CHANGE UP (ref 3.5–5.3)
PROT SERPL-MCNC: 7.4 G/DL — SIGNIFICANT CHANGE UP (ref 6–8.3)
RBC # BLD: 4.38 M/UL — SIGNIFICANT CHANGE UP (ref 3.8–5.2)
RBC # FLD: 18.9 % — HIGH (ref 10.3–14.5)
SODIUM SERPL-SCNC: 143 MMOL/L — SIGNIFICANT CHANGE UP (ref 135–145)
WBC # BLD: 4.5 K/UL — SIGNIFICANT CHANGE UP (ref 3.8–10.5)
WBC # FLD AUTO: 4.5 K/UL — SIGNIFICANT CHANGE UP (ref 3.8–10.5)

## 2025-06-11 PROCEDURE — 80053 COMPREHEN METABOLIC PANEL: CPT

## 2025-06-11 PROCEDURE — 36415 COLL VENOUS BLD VENIPUNCTURE: CPT

## 2025-06-11 PROCEDURE — 96413 CHEMO IV INFUSION 1 HR: CPT

## 2025-06-11 PROCEDURE — 85025 COMPLETE CBC W/AUTO DIFF WBC: CPT

## 2025-06-11 RX ORDER — BEVACIZUMAB-MALY 400 MG/16ML
375 INJECTION, SOLUTION INTRAVENOUS ONCE
Refills: 0 | Status: COMPLETED | OUTPATIENT
Start: 2025-06-11 | End: 2025-06-11

## 2025-06-11 RX ADMIN — Medication 10 MILLILITER(S): at 11:20

## 2025-06-11 RX ADMIN — BEVACIZUMAB-MALY 375 MILLIGRAM(S): 400 INJECTION, SOLUTION INTRAVENOUS at 11:00

## 2025-06-11 RX ADMIN — BEVACIZUMAB-MALY 375 MILLIGRAM(S): 400 INJECTION, SOLUTION INTRAVENOUS at 10:42

## 2025-06-20 ENCOUNTER — NON-APPOINTMENT (OUTPATIENT)
Age: 60
End: 2025-06-20

## 2025-07-02 ENCOUNTER — APPOINTMENT (OUTPATIENT)
Dept: HEMATOLOGY ONCOLOGY | Facility: CLINIC | Age: 60
End: 2025-07-02
Payer: MEDICAID

## 2025-07-02 ENCOUNTER — OUTPATIENT (OUTPATIENT)
Dept: OUTPATIENT SERVICES | Facility: HOSPITAL | Age: 60
LOS: 1 days | End: 2025-07-02
Payer: COMMERCIAL

## 2025-07-02 ENCOUNTER — LABORATORY RESULT (OUTPATIENT)
Age: 60
End: 2025-07-02

## 2025-07-02 ENCOUNTER — APPOINTMENT (OUTPATIENT)
Dept: INFUSION THERAPY | Facility: CLINIC | Age: 60
End: 2025-07-02

## 2025-07-02 VITALS
HEART RATE: 87 BPM | WEIGHT: 168 LBS | TEMPERATURE: 97.6 F | BODY MASS INDEX: 26.37 KG/M2 | HEIGHT: 67 IN | OXYGEN SATURATION: 98 % | SYSTOLIC BLOOD PRESSURE: 108 MMHG | RESPIRATION RATE: 18 BRPM | DIASTOLIC BLOOD PRESSURE: 73 MMHG

## 2025-07-02 VITALS
HEIGHT: 67 IN | WEIGHT: 167.99 LBS | TEMPERATURE: 98 F | DIASTOLIC BLOOD PRESSURE: 76 MMHG | RESPIRATION RATE: 18 BRPM | OXYGEN SATURATION: 99 % | HEART RATE: 76 BPM | SYSTOLIC BLOOD PRESSURE: 116 MMHG

## 2025-07-02 VITALS
OXYGEN SATURATION: 98 % | TEMPERATURE: 98 F | HEART RATE: 78 BPM | RESPIRATION RATE: 17 BRPM | SYSTOLIC BLOOD PRESSURE: 108 MMHG | DIASTOLIC BLOOD PRESSURE: 72 MMHG

## 2025-07-02 DIAGNOSIS — Z90.79 ACQUIRED ABSENCE OF OTHER GENITAL ORGAN(S): Chronic | ICD-10-CM

## 2025-07-02 DIAGNOSIS — Z98.890 OTHER SPECIFIED POSTPROCEDURAL STATES: Chronic | ICD-10-CM

## 2025-07-02 DIAGNOSIS — Z90.89 ACQUIRED ABSENCE OF OTHER ORGANS: Chronic | ICD-10-CM

## 2025-07-02 DIAGNOSIS — C78.00 SECONDARY MALIGNANT NEOPLASM OF UNSPECIFIED LUNG: ICD-10-CM

## 2025-07-02 DIAGNOSIS — Z90.49 ACQUIRED ABSENCE OF OTHER SPECIFIED PARTS OF DIGESTIVE TRACT: Chronic | ICD-10-CM

## 2025-07-02 LAB
ALBUMIN SERPL ELPH-MCNC: 3.6 G/DL
ALP BLD-CCNC: 172 U/L
ALT SERPL-CCNC: 35 U/L
ANION GAP SERPL CALC-SCNC: 4 MMOL/L
AST SERPL-CCNC: 42 U/L
BILIRUB SERPL-MCNC: 0.8 MG/DL
BUN SERPL-MCNC: 12 MG/DL
CALCIUM SERPL-MCNC: 9.9 MG/DL
CHLORIDE SERPL-SCNC: 111 MMOL/L
CO2 SERPL-SCNC: 28 MMOL/L
CREAT SERPL-MCNC: 0.6 MG/DL
EGFRCR SERPLBLD CKD-EPI 2021: 104 ML/MIN/1.73M2
GLUCOSE SERPL-MCNC: 108 MG/DL
HCT VFR BLD CALC: 39.2 %
HGB BLD-MCNC: 12.7 G/DL
LYMPHOCYTES # BLD AUTO: 1.7 K/UL
LYMPHOCYTES NFR BLD AUTO: 36 %
MAN DIFF?: NO
MCHC RBC-ENTMCNC: 32.1 PG
MCHC RBC-ENTMCNC: 32.4 G/DL
MCV RBC AUTO: 99 FL
NEUTROPHILS # BLD AUTO: 2.6 K/UL
NEUTROPHILS NFR BLD AUTO: 55 %
PLATELET # BLD AUTO: 208 K/UL
POTASSIUM SERPL-SCNC: 4.2 MMOL/L
PROT SERPL-MCNC: 7.3 G/DL
RBC # BLD: 3.96 M/UL
RBC # FLD: 19.3 %
SODIUM SERPL-SCNC: 143 MMOL/L
WBC # FLD AUTO: 4.9 K/UL

## 2025-07-02 PROCEDURE — 96413 CHEMO IV INFUSION 1 HR: CPT

## 2025-07-02 PROCEDURE — 99214 OFFICE O/P EST MOD 30 MIN: CPT | Mod: 25

## 2025-07-02 RX ORDER — BEVACIZUMAB-MALY 400 MG/16ML
375 INJECTION, SOLUTION INTRAVENOUS ONCE
Refills: 0 | Status: COMPLETED | OUTPATIENT
Start: 2025-07-02 | End: 2025-07-02

## 2025-07-02 RX ORDER — LIDOCAINE HYDROCHLORIDE 20 MG/ML
1 JELLY TOPICAL ONCE
Refills: 0 | Status: COMPLETED | OUTPATIENT
Start: 2025-07-02 | End: 2025-07-02

## 2025-07-02 RX ADMIN — BEVACIZUMAB-MALY 375 MILLIGRAM(S): 400 INJECTION, SOLUTION INTRAVENOUS at 14:20

## 2025-07-02 RX ADMIN — Medication 10 MILLILITER(S): at 14:22

## 2025-07-02 RX ADMIN — BEVACIZUMAB-MALY 375 MILLIGRAM(S): 400 INJECTION, SOLUTION INTRAVENOUS at 13:59

## 2025-07-03 LAB
APPEARANCE: ABNORMAL
BILIRUBIN URINE: NEGATIVE
BLOOD URINE: NEGATIVE
CEA SERPL-MCNC: 98.4 NG/ML
COLOR: YELLOW
GLUCOSE QUALITATIVE U: NEGATIVE MG/DL
KETONES URINE: ABNORMAL MG/DL
LEUKOCYTE ESTERASE URINE: ABNORMAL
NITRITE URINE: NEGATIVE
PH URINE: 6
PROTEIN URINE: NORMAL MG/DL
SPECIFIC GRAVITY URINE: 1.03
UROBILINOGEN URINE: 1 MG/DL

## 2025-07-16 ENCOUNTER — APPOINTMENT (OUTPATIENT)
Dept: INFUSION THERAPY | Facility: CLINIC | Age: 60
End: 2025-07-16

## 2025-07-16 ENCOUNTER — APPOINTMENT (OUTPATIENT)
Dept: HEMATOLOGY ONCOLOGY | Facility: CLINIC | Age: 60
End: 2025-07-16
Payer: MEDICAID

## 2025-07-16 ENCOUNTER — OUTPATIENT (OUTPATIENT)
Dept: OUTPATIENT SERVICES | Facility: HOSPITAL | Age: 60
LOS: 1 days | End: 2025-07-16
Payer: COMMERCIAL

## 2025-07-16 VITALS
WEIGHT: 162.92 LBS | HEIGHT: 67 IN | RESPIRATION RATE: 18 BRPM | HEART RATE: 88 BPM | TEMPERATURE: 99 F | DIASTOLIC BLOOD PRESSURE: 87 MMHG | SYSTOLIC BLOOD PRESSURE: 144 MMHG | OXYGEN SATURATION: 98 %

## 2025-07-16 VITALS
OXYGEN SATURATION: 97 % | WEIGHT: 163.13 LBS | RESPIRATION RATE: 18 BRPM | HEART RATE: 104 BPM | DIASTOLIC BLOOD PRESSURE: 83 MMHG | BODY MASS INDEX: 25.6 KG/M2 | TEMPERATURE: 98.3 F | SYSTOLIC BLOOD PRESSURE: 116 MMHG | HEIGHT: 67 IN

## 2025-07-16 DIAGNOSIS — C78.00 SECONDARY MALIGNANT NEOPLASM OF UNSPECIFIED LUNG: ICD-10-CM

## 2025-07-16 DIAGNOSIS — Z98.890 OTHER SPECIFIED POSTPROCEDURAL STATES: Chronic | ICD-10-CM

## 2025-07-16 DIAGNOSIS — Z90.49 ACQUIRED ABSENCE OF OTHER SPECIFIED PARTS OF DIGESTIVE TRACT: Chronic | ICD-10-CM

## 2025-07-16 DIAGNOSIS — Z90.79 ACQUIRED ABSENCE OF OTHER GENITAL ORGAN(S): Chronic | ICD-10-CM

## 2025-07-16 DIAGNOSIS — Z90.89 ACQUIRED ABSENCE OF OTHER ORGANS: Chronic | ICD-10-CM

## 2025-07-16 PROBLEM — K21.9 GERD (GASTROESOPHAGEAL REFLUX DISEASE): Status: ACTIVE | Noted: 2025-07-16

## 2025-07-16 LAB
ALBUMIN SERPL ELPH-MCNC: 4 G/DL
ALP BLD-CCNC: 178 U/L
ALT SERPL-CCNC: 42 U/L
ANION GAP SERPL CALC-SCNC: -1 MMOL/L
AST SERPL-CCNC: 42 U/L
BILIRUB SERPL-MCNC: 1 MG/DL
BUN SERPL-MCNC: 15 MG/DL
CALCIUM SERPL-MCNC: 9.7 MG/DL
CHLORIDE SERPL-SCNC: 116 MMOL/L
CO2 SERPL-SCNC: 26 MMOL/L
CREAT SERPL-MCNC: 0.5 MG/DL
EGFRCR SERPLBLD CKD-EPI 2021: 109 ML/MIN/1.73M2
GLUCOSE SERPL-MCNC: 129 MG/DL
HCT VFR BLD CALC: 39.5 %
HGB BLD-MCNC: 13.4 G/DL
LYMPHOCYTES # BLD AUTO: 1.2 K/UL
LYMPHOCYTES NFR BLD AUTO: 27.9 %
MAN DIFF?: NO
MCHC RBC-ENTMCNC: 33.2 PG
MCHC RBC-ENTMCNC: 33.9 G/DL
MCV RBC AUTO: 97.8 FL
NEUTROPHILS # BLD AUTO: 3 K/UL
NEUTROPHILS NFR BLD AUTO: 68 %
PLATELET # BLD AUTO: 127 K/UL
POTASSIUM SERPL-SCNC: 4 MMOL/L
PROT SERPL-MCNC: 7.6 G/DL
RBC # BLD: 4.04 M/UL
RBC # FLD: 17.6 %
SODIUM SERPL-SCNC: 141 MMOL/L
WBC # FLD AUTO: 4.4 K/UL

## 2025-07-16 PROCEDURE — 96409 CHEMO IV PUSH SNGL DRUG: CPT

## 2025-07-16 PROCEDURE — 99214 OFFICE O/P EST MOD 30 MIN: CPT | Mod: 25

## 2025-07-16 RX ORDER — LIDOCAINE HYDROCHLORIDE 20 MG/ML
1 JELLY TOPICAL ONCE
Refills: 0 | Status: COMPLETED | OUTPATIENT
Start: 2025-07-16 | End: 2025-07-16

## 2025-07-16 RX ORDER — OMEPRAZOLE 40 MG/1
40 CAPSULE, DELAYED RELEASE ORAL
Qty: 30 | Refills: 2 | Status: ACTIVE | COMMUNITY
Start: 2025-07-16 | End: 1900-01-01

## 2025-07-16 RX ORDER — BEVACIZUMAB-MALY 400 MG/16ML
375 INJECTION, SOLUTION INTRAVENOUS ONCE
Refills: 0 | Status: COMPLETED | OUTPATIENT
Start: 2025-07-16 | End: 2025-07-16

## 2025-07-16 RX ADMIN — BEVACIZUMAB-MALY 375 MILLIGRAM(S): 400 INJECTION, SOLUTION INTRAVENOUS at 14:31

## 2025-07-16 RX ADMIN — Medication 10 MILLILITER(S): at 14:45

## 2025-07-16 RX ADMIN — BEVACIZUMAB-MALY 375 MILLIGRAM(S): 400 INJECTION, SOLUTION INTRAVENOUS at 14:45

## 2025-07-16 NOTE — DISCHARGE INSTRUCTIONS: CHEMOTHERAPY - DC SYMPTOM 4
Episodes of diarrhea (more than 3 times a day), or if you are unable to tolerate food or fluids care team

## 2025-07-18 ENCOUNTER — NON-APPOINTMENT (OUTPATIENT)
Age: 60
End: 2025-07-18

## 2025-07-23 ENCOUNTER — RESULT REVIEW (OUTPATIENT)
Age: 60
End: 2025-07-23

## 2025-08-06 ENCOUNTER — APPOINTMENT (OUTPATIENT)
Dept: HEMATOLOGY ONCOLOGY | Facility: CLINIC | Age: 60
End: 2025-08-06

## 2025-08-08 ENCOUNTER — LABORATORY RESULT (OUTPATIENT)
Age: 60
End: 2025-08-08

## 2025-08-08 ENCOUNTER — APPOINTMENT (OUTPATIENT)
Dept: HEMATOLOGY ONCOLOGY | Facility: CLINIC | Age: 60
End: 2025-08-08
Payer: MEDICAID

## 2025-08-08 ENCOUNTER — OUTPATIENT (OUTPATIENT)
Dept: OUTPATIENT SERVICES | Facility: HOSPITAL | Age: 60
LOS: 1 days | End: 2025-08-08
Payer: MEDICAID

## 2025-08-08 ENCOUNTER — APPOINTMENT (OUTPATIENT)
Dept: CT IMAGING | Facility: HOSPITAL | Age: 60
End: 2025-08-08

## 2025-08-08 VITALS
BODY MASS INDEX: 25.76 KG/M2 | SYSTOLIC BLOOD PRESSURE: 111 MMHG | WEIGHT: 164.13 LBS | HEIGHT: 67 IN | RESPIRATION RATE: 18 BRPM | OXYGEN SATURATION: 97 % | HEART RATE: 81 BPM | DIASTOLIC BLOOD PRESSURE: 78 MMHG | TEMPERATURE: 98.6 F

## 2025-08-08 DIAGNOSIS — Z90.89 ACQUIRED ABSENCE OF OTHER ORGANS: Chronic | ICD-10-CM

## 2025-08-08 DIAGNOSIS — Z90.79 ACQUIRED ABSENCE OF OTHER GENITAL ORGAN(S): Chronic | ICD-10-CM

## 2025-08-08 DIAGNOSIS — Z98.890 OTHER SPECIFIED POSTPROCEDURAL STATES: Chronic | ICD-10-CM

## 2025-08-08 DIAGNOSIS — C18.9 MALIGNANT NEOPLASM OF COLON, UNSPECIFIED: ICD-10-CM

## 2025-08-08 DIAGNOSIS — C78.7 MALIGNANT NEOPLASM OF COLON, UNSPECIFIED: ICD-10-CM

## 2025-08-08 DIAGNOSIS — Z90.49 ACQUIRED ABSENCE OF OTHER SPECIFIED PARTS OF DIGESTIVE TRACT: Chronic | ICD-10-CM

## 2025-08-08 LAB
ALBUMIN SERPL ELPH-MCNC: 3.3 G/DL
ALP BLD-CCNC: 208 U/L
ALT SERPL-CCNC: 39 U/L
ANION GAP SERPL CALC-SCNC: 3 MMOL/L
AST SERPL-CCNC: 39 U/L
AUTO BASOPHILS #: 0.01 K/UL
AUTO BASOPHILS %: 0.2 %
AUTO EOSINOPHILS #: 0.07 K/UL
AUTO EOSINOPHILS %: 1.3 %
AUTO IMMATURE GRANULOCYTES #: 0.02 K/UL
AUTO LYMPHOCYTES #: 1.35 K/UL
AUTO LYMPHOCYTES %: 24.6 %
AUTO MONOCYTES #: 1.08 K/UL
AUTO MONOCYTES %: 19.7 %
AUTO NEUTROPHILS #: 2.95 K/UL
AUTO NEUTROPHILS %: 53.8 %
AUTO NRBC #: 0 K/UL
BILIRUB SERPL-MCNC: 1 MG/DL
BUN SERPL-MCNC: 14 MG/DL
CALCIUM SERPL-MCNC: 8.8 MG/DL
CHLORIDE SERPL-SCNC: 111 MMOL/L
CO2 SERPL-SCNC: 24 MMOL/L
CREAT SERPL-MCNC: 0.6 MG/DL
EGFRCR SERPLBLD CKD-EPI 2021: 104 ML/MIN/1.73M2
GLUCOSE SERPL-MCNC: 95 MG/DL
HCT VFR BLD CALC: 36.6 %
HGB BLD-MCNC: 12 G/DL
IMM GRANULOCYTES NFR BLD AUTO: 0.4 %
LDH SERPL-CCNC: 307 U/L
MAGNESIUM SERPL-MCNC: 1.9 MG/DL
MAN DIFF?: NORMAL
MCHC RBC-ENTMCNC: 32.8 G/DL
MCHC RBC-ENTMCNC: 33.7 PG
MCV RBC AUTO: 102.8 FL
PHOSPHATE SERPL-MCNC: 3.4 MG/DL
PLATELET # BLD AUTO: 210 K/UL
PMV BLD AUTO: 0 /100 WBCS
PMV BLD: 10.2 FL
POTASSIUM SERPL-SCNC: 4.1 MMOL/L
PROT SERPL-MCNC: 7.5 G/DL
RBC # BLD: 3.56 M/UL
RBC # FLD: 15.6 %
SODIUM SERPL-SCNC: 138 MMOL/L
WBC # FLD AUTO: 5.48 K/UL

## 2025-08-08 PROCEDURE — 71260 CT THORAX DX C+: CPT | Mod: 26

## 2025-08-08 PROCEDURE — 99214 OFFICE O/P EST MOD 30 MIN: CPT | Mod: 25

## 2025-08-08 PROCEDURE — 74177 CT ABD & PELVIS W/CONTRAST: CPT | Mod: 26

## 2025-08-12 LAB — CEA SERPL-MCNC: 115 NG/ML

## 2025-08-13 ENCOUNTER — APPOINTMENT (OUTPATIENT)
Dept: INFUSION THERAPY | Facility: CLINIC | Age: 60
End: 2025-08-13

## 2025-08-13 ENCOUNTER — APPOINTMENT (OUTPATIENT)
Dept: HEMATOLOGY ONCOLOGY | Facility: CLINIC | Age: 60
End: 2025-08-13

## 2025-08-22 ENCOUNTER — NON-APPOINTMENT (OUTPATIENT)
Age: 60
End: 2025-08-22

## 2025-08-22 DIAGNOSIS — Z15.01 GENETIC SUSCEPTIBILITY TO MALIGNANT NEOPLASM OF BREAST: ICD-10-CM

## 2025-08-22 DIAGNOSIS — C18.9 MALIGNANT NEOPLASM OF COLON, UNSPECIFIED: ICD-10-CM

## 2025-08-22 DIAGNOSIS — C78.7 MALIGNANT NEOPLASM OF COLON, UNSPECIFIED: ICD-10-CM

## 2025-08-22 DIAGNOSIS — Z15.09 GENETIC SUSCEPTIBILITY TO MALIGNANT NEOPLASM OF BREAST: ICD-10-CM

## 2025-08-22 RX ORDER — TALAZOPARIB 1 MG/1
1 CAPSULE, LIQUID FILLED ORAL DAILY
Qty: 1 | Refills: 4 | Status: ACTIVE | COMMUNITY
Start: 2025-08-22 | End: 1900-01-01

## 2025-08-27 ENCOUNTER — APPOINTMENT (OUTPATIENT)
Dept: HEMATOLOGY ONCOLOGY | Facility: CLINIC | Age: 60
End: 2025-08-27

## 2025-08-27 ENCOUNTER — APPOINTMENT (OUTPATIENT)
Dept: INFUSION THERAPY | Facility: CLINIC | Age: 60
End: 2025-08-27

## 2025-09-12 ENCOUNTER — NON-APPOINTMENT (OUTPATIENT)
Age: 60
End: 2025-09-12

## 2025-09-17 ENCOUNTER — APPOINTMENT (OUTPATIENT)
Dept: HEMATOLOGY ONCOLOGY | Facility: CLINIC | Age: 60
End: 2025-09-17
Payer: MEDICAID

## 2025-09-17 VITALS
HEIGHT: 67 IN | BODY MASS INDEX: 24.8 KG/M2 | HEART RATE: 93 BPM | DIASTOLIC BLOOD PRESSURE: 77 MMHG | TEMPERATURE: 97.7 F | WEIGHT: 158 LBS | SYSTOLIC BLOOD PRESSURE: 112 MMHG | OXYGEN SATURATION: 99 % | RESPIRATION RATE: 18 BRPM

## 2025-09-17 DIAGNOSIS — C18.9 MALIGNANT NEOPLASM OF COLON, UNSPECIFIED: ICD-10-CM

## 2025-09-17 DIAGNOSIS — C78.7 MALIGNANT NEOPLASM OF COLON, UNSPECIFIED: ICD-10-CM

## 2025-09-17 PROCEDURE — 99214 OFFICE O/P EST MOD 30 MIN: CPT | Mod: 25

## (undated) DEVICE — SUT BOOT STANDARD (ORIGINAL YELLOW) 5 PAIR

## (undated) DEVICE — UTERINE MANIPULATOR CONMED VCARE MED 34MM

## (undated) DEVICE — WARMING BLANKET FULL ADULT

## (undated) DEVICE — SUT VICRYL 3-0 27" SH

## (undated) DEVICE — SUT VLOC 180 0 12" GS-21 GREEN

## (undated) DEVICE — VENODYNE/SCD SLEEVE CALF MEDIUM

## (undated) DEVICE — XI ARM NEEDLE DRIVER MEGA

## (undated) DEVICE — TROCAR APPLIED MEDICAL KII FIOS FIRST ENTRY 5MM X 100MM Z-THREAD

## (undated) DEVICE — ELCTR BOVIE PENCIL HANDPIECE ROCKER SWITCH 15FT

## (undated) DEVICE — XI ARM SCISSOR MONO CURVED

## (undated) DEVICE — XI TIP COVER

## (undated) DEVICE — STAPLER COVIDIEN ENDO GIA STANDARD HANDLE

## (undated) DEVICE — TUBING STRYKER PNEUMOCLEAR HIGH FLOW

## (undated) DEVICE — ELCTR THUNDERBEAT HANDPIECE 5MM X 20CM FRONT CONTROL

## (undated) DEVICE — TUBING PLUME AWAY 4.0

## (undated) DEVICE — SUT VICRYL 2-0 27" RB-1

## (undated) DEVICE — BAG ETHICON SPECIMEN RETRIEVAL 4 X 6"

## (undated) DEVICE — GLV 7 PROTEXIS (WHITE)

## (undated) DEVICE — SUT PDS II 3-0 27" SH UNDYED

## (undated) DEVICE — TIP METZENBAUM SCISSOR MONOPOLAR ENDOCUT (ORANGE)

## (undated) DEVICE — XI SEAL UNIV 5- 8 MM

## (undated) DEVICE — SUT PROLENE 1 30" CT

## (undated) DEVICE — INSUFFLATION NDL COVIDIEN SURGINEEDLE VERESS 120MM

## (undated) DEVICE — XI DRAPE ARM

## (undated) DEVICE — VESSEL LOOP MAXI-BLUE 0.120" X 16"

## (undated) DEVICE — XI VESSEL SEALER

## (undated) DEVICE — SUT VICRYL 2-0 36" CT UNDYED

## (undated) DEVICE — PREP SCRUB BRUSH W CHG 4%

## (undated) DEVICE — POSITIONER PINK PAD PIGAZZI SYSTEM XL W ARM PROTECTOR

## (undated) DEVICE — Device

## (undated) DEVICE — DRAPE INSTRUMENT POUCH 10" X 18"

## (undated) DEVICE — SYR LUER LOK 30CC

## (undated) DEVICE — XI 12MM AND STAPLER CANNULA SEAL

## (undated) DEVICE — DRSG DERMABOND 0.7ML

## (undated) DEVICE — BLADE SCALPEL SAFETY #15 WITH PLASTIC GREEN HANDLE

## (undated) DEVICE — POSITIONER FOAM EGG CRATE ULNAR 2PCS (PINK)

## (undated) DEVICE — FOLEY TRAY 16FR 5CC LF UMETER CLOSED

## (undated) DEVICE — D HELP - CLEARVIEW CLEARIFY SYSTEM

## (undated) DEVICE — WARMING BLANKET UPPER ADULT

## (undated) DEVICE — INZII RETRIEVAL SYSTEM 5MM

## (undated) DEVICE — ELCTR BOVIE PENCIL SMOKE EVACUATION

## (undated) DEVICE — TUBING STRYKER PNEUMOCLEAR SMOKE EVACUATION HIGH FLOW

## (undated) DEVICE — XI OBTURATOR OPTICAL BLADELESS 8MM

## (undated) DEVICE — SUT VICRYL 0 27" UR-6

## (undated) DEVICE — ELCTR THUNDERBEAT HANDPIECE 5MM X 35CM FRONT CONTROL

## (undated) DEVICE — SUCTION YANKAUER BULBOUS TIP W VENT

## (undated) DEVICE — CLIP APPLR DISP 5MM

## (undated) DEVICE — STAPLER SKIN PROXIMATE

## (undated) DEVICE — ELCTR BOVIE PENCIL BLADE 10FT

## (undated) DEVICE — NDL SPINAL 22G X 3.5" (BLACK)

## (undated) DEVICE — SPONGE ENDO PEANUT 5MM

## (undated) DEVICE — MONOPOLAR CORD HI FREQ DISPOSABLE

## (undated) DEVICE — SUT PLEDGET SOFT MEDIUM 1/4" X 1/8" X 1/16" X6

## (undated) DEVICE — RUMI COLPO-PNEUMO OCCLUDER

## (undated) DEVICE — STAPLER COVIDIEN ENDO GIA SHORT HANDLE

## (undated) DEVICE — PACK GENERAL LAPAROSCOPY

## (undated) DEVICE — DRAPE IOBAN 23" X 23"

## (undated) DEVICE — TUBING AIRSEAL TRI-LUMEN FILTERED

## (undated) DEVICE — SUT VICRYL 4-0 2" RB-1

## (undated) DEVICE — XI DRAPE COLUMN

## (undated) DEVICE — XI ARM FORCEP PROGRASP 8MM

## (undated) DEVICE — TROCAR APPLIED MEDICAL KII FIOS FIRST ENTRY 5MM X 100MM ADVANCED FIXATION

## (undated) DEVICE — CUSA EXCEL TIP 36KHZ STANDARD CURVED EXTENDED

## (undated) DEVICE — SUT PDS II 2-0 27" SH

## (undated) DEVICE — XI STAPLER SUREFORM 60

## (undated) DEVICE — XI ENDOWRIST 12 - 8 MM CANNULA REDUCER

## (undated) DEVICE — CANNULA APPLIED MEDICAL KII OPTICAL 12MM X 100MM Z-THREAD

## (undated) DEVICE — BLADE SURGICAL #15 CARBON

## (undated) DEVICE — TROCAR APPLIED MEDICAL KII BALLOON BLUNT TIP 12MM X 100MM

## (undated) DEVICE — PACK PERI GYN

## (undated) DEVICE — PREP CHLORAPREP SWABSTICK 5.25ML

## (undated) DEVICE — SUT MONOCRYL 4-0 27" PS-2 UNDYED

## (undated) DEVICE — XI ARM FORCEP FENESTRATED BIPOLAR 8MM

## (undated) DEVICE — SUT PLEDGET CV FELT SQ PTFE 8 X 8MM

## (undated) DEVICE — XI ENDOWRIST SUCTION IRRIGATOR 8MM

## (undated) DEVICE — CUSA EXCEL CEM NOSECONE 36KHZ

## (undated) DEVICE — SUT VICRYL 3-0 27" CT-1

## (undated) DEVICE — DRAPE FLUID WARMER 44 X 66"

## (undated) DEVICE — BLADE SURGICAL #11 CARBON

## (undated) DEVICE — CUSA MANIFOLD TUBING 36KHZ

## (undated) DEVICE — APPL LAPAROSCOPIC EXTENDED 35CM

## (undated) DEVICE — SLEEVE APPLIED MEDICAL KII 5MM X100MM Z-THREAD

## (undated) DEVICE — LUBRICATING JELLY ONESHOT 1.25OZ